# Patient Record
Sex: FEMALE | Race: WHITE | NOT HISPANIC OR LATINO | Employment: FULL TIME | URBAN - METROPOLITAN AREA
[De-identification: names, ages, dates, MRNs, and addresses within clinical notes are randomized per-mention and may not be internally consistent; named-entity substitution may affect disease eponyms.]

---
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
## 2020-03-09 ENCOUNTER — MYC MEDICAL ADVICE (OUTPATIENT)
Dept: PEDIATRICS | Facility: OTHER | Age: 20
End: 2020-03-09

## 2020-03-09 DIAGNOSIS — Z29.8 * * * SBE PROPHYLAXIS * * *: Primary | ICD-10-CM

## 2020-03-09 RX ORDER — AMOXICILLIN 500 MG/1
2000 CAPSULE ORAL ONCE
Qty: 4 CAPSULE | Refills: 0 | Status: SHIPPED | OUTPATIENT
Start: 2020-03-09 | End: 2020-03-09

## 2020-03-09 NOTE — TELEPHONE ENCOUNTER
Called and spoke with patient mother. Scheduled for this Thursday. Pharmacy for rx [facility] [place]

## 2020-03-12 ENCOUNTER — OFFICE VISIT (OUTPATIENT)
Dept: PEDIATRICS | Facility: OTHER | Age: 20
End: 2020-03-12
Payer: COMMERCIAL

## 2020-03-12 VITALS
OXYGEN SATURATION: 96 % | HEIGHT: 63 IN | SYSTOLIC BLOOD PRESSURE: 108 MMHG | RESPIRATION RATE: 22 BRPM | HEART RATE: 120 BPM | WEIGHT: 205 LBS | DIASTOLIC BLOOD PRESSURE: 62 MMHG | BODY MASS INDEX: 36.32 KG/M2 | TEMPERATURE: 97.8 F

## 2020-03-12 DIAGNOSIS — J45.31 MILD PERSISTENT ASTHMA WITH EXACERBATION: Primary | ICD-10-CM

## 2020-03-12 DIAGNOSIS — J45.40 MODERATE PERSISTENT ASTHMA, UNSPECIFIED WHETHER COMPLICATED: ICD-10-CM

## 2020-03-12 DIAGNOSIS — R63.5 WEIGHT GAIN: ICD-10-CM

## 2020-03-12 DIAGNOSIS — R53.83 OTHER FATIGUE: ICD-10-CM

## 2020-03-12 DIAGNOSIS — R63.5 RAPID WEIGHT GAIN: ICD-10-CM

## 2020-03-12 DIAGNOSIS — Z23 NEED FOR PROPHYLACTIC VACCINATION AND INOCULATION AGAINST INFLUENZA: ICD-10-CM

## 2020-03-12 DIAGNOSIS — F90.9 ATTENTION DEFICIT HYPERACTIVITY DISORDER (ADHD), UNSPECIFIED ADHD TYPE: ICD-10-CM

## 2020-03-12 PROCEDURE — 94640 AIRWAY INHALATION TREATMENT: CPT | Performed by: PEDIATRICS

## 2020-03-12 PROCEDURE — 96127 BRIEF EMOTIONAL/BEHAV ASSMT: CPT | Performed by: PEDIATRICS

## 2020-03-12 PROCEDURE — 99214 OFFICE O/P EST MOD 30 MIN: CPT | Mod: 25 | Performed by: PEDIATRICS

## 2020-03-12 RX ORDER — ALBUTEROL SULFATE 90 UG/1
2 AEROSOL, METERED RESPIRATORY (INHALATION) EVERY 4 HOURS PRN
Qty: 2 INHALER | Refills: 4 | Status: SHIPPED | OUTPATIENT
Start: 2020-03-12 | End: 2022-07-20

## 2020-03-12 RX ORDER — ALBUTEROL SULFATE 0.83 MG/ML
2.5 SOLUTION RESPIRATORY (INHALATION) ONCE
Status: COMPLETED | OUTPATIENT
Start: 2020-03-12 | End: 2020-03-12

## 2020-03-12 RX ORDER — ALBUTEROL SULFATE 0.83 MG/ML
2.5 SOLUTION RESPIRATORY (INHALATION) EVERY 4 HOURS PRN
Qty: 1 BOX | Refills: 11 | Status: SHIPPED | OUTPATIENT
Start: 2020-03-12 | End: 2022-07-20

## 2020-03-12 RX ORDER — BUPROPION HYDROCHLORIDE 150 MG/1
150 TABLET ORAL EVERY MORNING
Qty: 30 TABLET | Refills: 0 | Status: SHIPPED | OUTPATIENT
Start: 2020-03-12 | End: 2021-05-19

## 2020-03-12 RX ADMIN — ALBUTEROL SULFATE 2.5 MG: 0.83 SOLUTION RESPIRATORY (INHALATION) at 14:27

## 2020-03-12 ASSESSMENT — ANXIETY QUESTIONNAIRES
5. BEING SO RESTLESS THAT IT IS HARD TO SIT STILL: NOT AT ALL
7. FEELING AFRAID AS IF SOMETHING AWFUL MIGHT HAPPEN: NOT AT ALL
6. BECOMING EASILY ANNOYED OR IRRITABLE: MORE THAN HALF THE DAYS
3. WORRYING TOO MUCH ABOUT DIFFERENT THINGS: MORE THAN HALF THE DAYS
GAD7 TOTAL SCORE: 8
2. NOT BEING ABLE TO STOP OR CONTROL WORRYING: MORE THAN HALF THE DAYS
1. FEELING NERVOUS, ANXIOUS, OR ON EDGE: MORE THAN HALF THE DAYS

## 2020-03-12 ASSESSMENT — PATIENT HEALTH QUESTIONNAIRE - PHQ9
SUM OF ALL RESPONSES TO PHQ QUESTIONS 1-9: 16
5. POOR APPETITE OR OVEREATING: NOT AT ALL

## 2020-03-12 ASSESSMENT — MIFFLIN-ST. JEOR: SCORE: 1670.44

## 2020-03-12 NOTE — PROGRESS NOTES
SUBJECTIVE:                                                      Chief Complaint   Patient presents with     RECHECK      Health Maintenance Due   Topic Date Due     CHLAMYDIA SCREENING  2000     HIV SCREENING  02/02/2015        HPI:  Heather is a 20 year old female who presents to clinic today for a 2-3-day illness consisting of not feeling well, sore throat, cough this morning. No runny/stuffy nose.  She is wheezing and having some dyspnea. No post-tussive emesis. No recent fevers. Having headaches. No myalgias.  Vaccinations UTD. No history of travel outside [place] in the last month or contact with a person diagnosed with with Coronavirus. 2 weeks ago, started Qvar 2 puffs daily. No albuterol inhaler or nebs as she states she cannot find it. No oral steroids in last 1 yr.       ROS: Parent's observations of the patient at home are reduced activity, normal appetite and normal fluid intake.   Voiding at least every 6-8 hours. ROS: Negative for constitutional, eye, ear, nose, throat, skin, respiratory, cardiac, and gastrointestinal other than those outlined in the HPI.    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        H/O: iron deficiency anemia 04/13/2015     Priority: Medium     Mild intellectual disability 03/18/2012     Priority: Medium     Idiopathic thrombocytopenia (H) 01/25/2012     Priority: Medium     Seen by heme/onc Dr. Ram 7/17/12. She noted that patients with VCF sydrome can have macrothrombocytopenia with associated platelet dysfunction.  Heather's blood smear did not show large platelets and she has not had difficulties with bleeding to suggest platelet dysfunction. Pplatelet closure time was done and normal. It is also possible that her intermitted thrombocytopenia is due to immune mediated destruction of platelets as well.  Her platelet count has never been low enough to warrant treatment but she certainly is at risk for more severe thrombocytopenia so if she develops petechaie or brusing would need a CBC.    Last Hem visit 10/15/12:  Intermittent mild thrombocytopenia: Patients with VCF sydrome can have macrothrombocytopenia with associated platelet dysfunction. Heather's blood smear did not show large platelets and she has not had difficulties with bleeding to suggest platelet dysfunction. In addition, her platelet closure time was normal supporting that her platelets do function normally. It is certainly possible that her intermitted thrombocytopenia is due to immune mediated destruction of platelets as well. Her platelet count has never been low enough to warrant treatment but she certainly is at risk for more severe thrombocytopenia so if she develops petechaie or brusing would need a CBC.             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     Priority: Medium     Mild persistent asthma with exacerbation 04/21/2005     Priority:  "Medium     Congenital anomaly of aortic arch      Priority: Medium     Non-obstructed cervical right aortic arch        MEDICATIONS:  Current Outpatient Medications   Medication Sig Dispense Refill     albuterol (PROAIR HFA) 108 (90 BASE) MCG/ACT Inhaler Inhale 2 puffs into the lungs every 4 hours as needed for shortness of breath / dyspnea (cough, wheezing or shortness of breath) 2 Inhaler 1     albuterol (PROVENTIL) (2.5 MG/3ML) 0.083% neb solution Take 1 vial (2.5 mg) by nebulization every 4 hours as needed for shortness of breath / dyspnea 1 Box 11     beclomethasone HFA (QVAR REDIHALER) 40 MCG/ACT inhaler Inhale 2 puffs into the lungs daily 10.6 g 11     escitalopram (LEXAPRO) 20 MG tablet Take 1/2 tab daily x 1 week, then 1 tab daily 30 tablet 0     lactase (LACTAID) 3000 UNIT tablet Take 1 tablet (3,000 Units) by mouth 3 times daily (with meals) 100 tablet 3     methylphenidate ER (CONCERTA) 36 MG CR tablet Take 1 tablet (36 mg) by mouth every morning 30 tablet 0     Methylphenidate HCl ER 36 MG TB24 Take 1 tablet by mouth every morning  0     norgestimate-ethinyl estradiol (ORTHO-CYCLEN/SPRINTEC) 0.25-35 MG-MCG tablet Take 1 tablet by mouth daily 90 tablet 3     ondansetron (ZOFRAN) 8 MG tablet Take 1 tablet (8 mg) by mouth every 8 hours as needed for nausea 15 tablet 0     spacer (OPTICHAMBER GA) holding chamber Use with inhaler 1 each 11     Spacer/Aero-Holding Chambers (OPTICHAMBER ADVANTAGE-LG MASK) MISC 1 Device as needed 2 each 3     Spacer/Aero-Holding Chambers (OPTICHAMBER GA-LG MASK) BUFFY 1 Device as needed 1 each 3      ALLERGIES:  Allergies   Allergen Reactions     No Known Drug Allergies        Problem list and histories reviewed & adjusted, as indicated.    OBJECTIVE:                                                      /62   Pulse 120   Temp 97.8  F (36.6  C) (Temporal)   Resp 22   Ht 5' 3.09\" (1.602 m)   Wt 205 lb (93 kg)   LMP 03/07/2020   SpO2 96%   BMI 36.21 kg/m   "   Blood pressure percentiles are not available for patients who are 18 years or older.    General: alert, active, mildly ill-appearing, non-toxic  HEENT: conjunctiva non-injected, oral pharynx erythematous without exudate or lesions, MMM  Neck: supple, normal ROM, no adenopathy  Ears: Left: Pinna/ tragus non-tender. Normal ear canal. Tympanic membrane pearly gray with sharp landmarks. Right: Pinna/ tragus non-tender. Normal ear canal. Tympanic membrane pearly gray with sharp landmarks.   Lungs (pre and post albuterol neb):   Pre-neb: no retractions, decreased breath sounds, no rhonchi, no rales and few wheezes.  Post-neb: no retractions, improved breath sounds,  no rhonchi, no rales and diffuse expiratory wheezes.  CV: RRR, no murmurs, CR < 2 sec  ABDM: soft  Skin: no rashes    DIAGNOSTICS:  None      ASSESSMENT/PLAN:     Mild Intermittent Asthma with Exacerbation--  Viral URI--    Steroid burst not indicated.    Start albuterol nebs/inhaler every 4-6 hours as needed for cough, wheeze or shortness of breath. Continue at least 2-3 times daily until cough gone.  Continue daily preventative medication therapy: will increase Qvar 40 mcg to 2 puffs 2 times daily during colds.   Asthma Action Plan is up-to-date. Copy given.   Recommend annual Influenza vaccine.             SUBJECTIVE:                                                      Chief Complaint   Patient presents with     RECHECK      Health Maintenance Due   Topic Date Due     CHLAMYDIA SCREENING  2000     HIV SCREENING  02/02/2015        HPI:  Heather is a 20 year old female who also presents to clinic today with her mom for concerns for depression for the past year.     Heather endorses feeling unmotivated and depressed. Mom states that she just wants to lay in bed all the time with shades pulled, on electronics. She is not socializing with the family and frequently not eating supper with the family. Her hours at [employer] have been cut. Mom suspects this  is due to her lack of motivation. Mom also concerned that she is gaining weight with not exercising and eating sweets.     Her teacher has also expressed concerns for depression. She recently emailed mom the following: She seems to have lost interest in most things that she used to be excited about. When we met last we talked about Heather's attitude about coming to [employer] and this has seen a decline. She is extremely unmotivated to engage in any activities in class. She seems to have lost interest in most things in life. I have worked with Heather since she was in 9th grade and knew her in elementary. The Heather I see now is not the Heather I knew previously.      Heather endorses having a depressed mood or loss of interest or pleasure almost every day.   Endorses having significant weight weight gain.  Endorses having hypersomnia nearly every day.  Endorses having fatigue or loss of energy nearly every day.   Mom endorses psychomotor retardation nearly every day.     The symptoms cause clinically significant distress or impairment in social, occupational, or other important areas of functioning. The symptoms are not attributable to drugs or to another medical condition. The symptoms are not attributable to a significant loss.      No history of emanuel or hallucinations. No parental concern for risky behaviors including illicit and Rx drug use, cutting, suicidal ideation or attempts.     Family history is remarkable anxiety and depression on both sides.  No bipolar disorder. New stressors include learning how to drive. Ongoing stressors include weight gain.    Behavioral therapy includes 1 year ago, stopped after about 7 sessions because she states it didn't feel like it was helping as she would just get talking then have to wrap up the sessions.  Extracurricular activities: none.  Exercise: no regular.    Confidentially, Heather denies high risk behaviors including tobacco, alcohol or drug use. Denies sexual  activity. Denies cutting and self harm behaviors. No SI or HI. Not taking OTC or Rx medications without a parent or provider's advisement.       ROS: Negative for constitutional, eye, ear, nose, throat, skin, respiratory, cardiac, and gastrointestinal other than those outlined in the HPI.    Past Medical History:   Diagnosis Date     Closed fracture of shaft of femur (H) 3/01    casted     Congenital anomaly of aortic arch 2000    non-obstructive, cervical Rt aortic arch, median gradient 6 mm HG, stable, last ECHO 11/2011     Menorrhagia with regular cycle 4/5/2016     Mild persistent asthma 4/21/2005     Nonspecific abnormal auditory function studies 2002    mild hearing loss bilaterally, may need hearing aids in the future     Other closed fracture of lower end of humerus 9/04    right elbow intercondylar transverse fx w/ posterior dislocation, treated surgically     Other speech disturbance 3/6/2002    speech therapy at school     Unspecified otitis media     Recurrent otitis     Velocardiofacial syndrome      Past Surgical History:   Procedure Laterality Date     ADENOIDECTOMY  04/11/2006     C ECHO HEART XTHORACIC,COMPLETE, W/O DOPPLER  9/04    right aortic arch     C NONSPECIFIC PROCEDURE  9/04    right elbow fx surgically repaired at [facility]     EXAM UNDER ANESTHESIA EAR(S) Right 1/14/2015    Procedure: EXAM UNDER ANESTHESIA EAR(S);  Surgeon: Maryjo Slade MD;  Location: [facility] OR     HC CREATE EARDRUM OPENING,GEN ANESTH  4/2002, 7/25/2007, 5/19/08    P.E. Tubes     HC RECONSTRUCTION OF THROAT  07/25/2007, 5/19/08    Pharyngeal flap attachment     REMOVE TUBE, MYRINGOTOMY, INSERT PAPER PATCH, COMBINED Left 1/14/2015    Procedure: COMBINED REMOVE TUBE, MYRINGOTOMY, INSERT PAPER PATCH;  Surgeon: Maryjo Slade MD;  Location: [facility] OR     .  Patient Active Problem List   Diagnosis     Congenital anomaly of aortic arch     Mild persistent asthma with exacerbation     Velocardiofacial  syndrome     Health Care Home     Idiopathic thrombocytopenia (H)     Mild intellectual disability     H/O: iron deficiency anemia     Attention deficit hyperactivity disorder (ADHD), unspecified ADHD type     Speech articulation disorder     Cutis marmorata     Hearing loss, unspecified hearing loss type, L     H/O vitamin D deficiency     Low calcium levels     Moderate persistent asthma, unspecified whether complicated     Lactose intolerance     Rapid weight gain     JOHN (generalized anxiety disorder)     Elevated TSH     Past Medical History:   Diagnosis Date     Closed fracture of shaft of femur (H) 3/01    casted     Congenital anomaly of aortic arch 2000    non-obstructive, cervical Rt aortic arch, median gradient 6 mm HG, stable, last ECHO 11/2011     Menorrhagia with regular cycle 4/5/2016     Mild persistent asthma 4/21/2005     Nonspecific abnormal auditory function studies 2002    mild hearing loss bilaterally, may need hearing aids in the future     Other closed fracture of lower end of humerus 9/04    right elbow intercondylar transverse fx w/ posterior dislocation, treated surgically     Other speech disturbance 3/6/2002    speech therapy at school     Unspecified otitis media     Recurrent otitis     Velocardiofacial syndrome      Current Outpatient Medications   Medication     albuterol (PROAIR HFA) 108 (90 Base) MCG/ACT inhaler     albuterol (PROVENTIL) (2.5 MG/3ML) 0.083% neb solution     beclomethasone HFA (QVAR REDIHALER) 40 MCG/ACT inhaler     escitalopram (LEXAPRO) 20 MG tablet     lactase (LACTAID) 3000 UNIT tablet     methylphenidate ER (CONCERTA) 36 MG CR tablet     Methylphenidate HCl ER 36 MG TB24     norgestimate-ethinyl estradiol (ORTHO-CYCLEN/SPRINTEC) 0.25-35 MG-MCG tablet     ondansetron (ZOFRAN) 8 MG tablet     spacer (OPTICPartigiBER GA) holding chamber     Spacer/Aero-Holding Chambers (OPTICHAMBER ADVANTAGE-LG MASK) MISC     Spacer/Aero-Holding Chambers (OPTICHAMBER GA-LG  "MASK) BUFFY     No current facility-administered medications for this visit.         Allergies   Allergen Reactions     No Known Drug Allergies        Social History:  Heather lives with her parents. Attends [employer] school and works at [employer].    OBJECTIVE:                                                      /62   Pulse 120   Temp 97.8  F (36.6  C) (Temporal)   Resp 22   Ht 5' 3.09\" (1.602 m)   Wt 205 lb (93 kg)   LMP 03/07/2020   SpO2 96%   BMI 36.21 kg/m      Physical Exam:  Appearance: in no apparent distress, well developed and well nourished   Neck: question thyromegaly, no masses  Heart: S1, S2 normal, no murmur, no gallop, rate regular  Lungs (pre and post albuterol neb):   Pre-neb: no retractions, decreased breath sounds, no rhonchi, no rales and few wheezes.  Post-neb: no retractions, improved breath sounds,  no rhonchi, no rales and diffuse expiratory wheezes.  Neuro: CN 2-12 intact, PERR, normal gait  Skin: no cutting  Psych: alert and oriented x 3, appropriate affect    PHQ 10/25/2019 3/12/2020   PHQ-9 Total Score 12 16   Q9: Thoughts of better off dead/self-harm past 2 weeks Not at all Not at all     JOHN-7 SCORE 10/25/2019 3/12/2020   Total Score 8 (mild anxiety) -   Total Score 8 8           ASSESSMENT/PLAN:                                                      Major Depressive Disorder (MDD)--  ADHD (Attention Deficit Hyperactivity Disorder) --    Laboratory evaluation to evaluate for organic causes.   Given mom's success with bupropion (WELLBUTRIN XL) and indications for Major Depressive Disorder (MDD) and ADHD (Attention Deficit Hyperactivity Disorder), it is a reasonable choice: 150 mg daily.   Reviewed importance of behavioral therapy for skill building, emotion regulation of skills and eventual ability to wean off of medication therapy. Recommend asking for cognitive behavioral therapy. Family to call insurance for preferred therapists. Suggested providers per Patient " Instructions.    Reviewed importance of coping skills including 1 hour of daily aerobic activity 5 times weekly, writing in a journal, and listening to music.    Consider purchasing the  The Positivity Kit or other interactive journals by Esme Fox.  Recommend family remove all guns from home and lock up medications.   Family agrees with plan for counceling and frequent visits during the first 2 months of therapy.  Patient to call in 2 weeks to discuss potential side effects and RTC in 1 month if tollerating medication well.  Heather agrees to call myself, a parent or 911 if there is concern for hurting oneself or others. Heather has my card to reach me at clinic. Emergency hotline numbers given.     Patient and Heather express understanding and agreement with the plan and has no further questions.    Electronically signed by Debbie Rodriguez MD.          .

## 2020-03-12 NOTE — LETTER
My Asthma Action Plan    Name: Heather Lovett   YOB: 2000  Date: 10/25/2019   My doctor: Debbie Rodriguez MD, MD   My clinic: [facility]        My Control Medicine: Beclomethasone dipropionate (Qvar Redihaler) -  40 mcg 2 puffs once daily with spacer, increase to 2 times daily during colds  My Rescue Medicine: Albuterol Nebulizer Solution 1 vial EVERY 4 HOURS as needed -OR- Albuterol (Proair/Ventolin/Proventil HFA) 2 puffs EVERY 4 HOURS as needed. Use a spacer if recommended by your provider.  My Oral Steroid Medicine: none My Asthma Severity:   Moderate Persistent  Know your asthma triggers: upper respiratory infections, exercise or sports and anxiety        The medication may be given at school or day care?: Yes  Child can carry and use inhaler at school with approval of school nurse?: Yes       GREEN ZONE   Good Control    I feel good    No cough or wheeze    Can work, sleep and play without asthma symptoms       Take your asthma control medicine every day.     1. If exercise triggers your asthma, take your rescue medication    15 minutes before exercise or sports, and    During exercise if you have asthma symptoms  2. Spacer to use with inhaler: If you have a spacer, make sure to use it with your inhaler             YELLOW ZONE Getting Worse  I have ANY of these:    I do not feel good    Cough or wheeze    Chest feels tight    Wake up at night   1. Keep taking your Green Zone medications  2. Start taking your rescue medicine:    every 20 minutes for up to 1 hour. Then every 4 hours for 24-48 hours.  3. If you stay in the Yellow Zone for more than 12-24 hours, contact your doctor.  4. If you do not return to the Green Zone in 12-24 hours or you get worse, start taking your oral steroid medicine if prescribed by your provider.           RED ZONE Medical Alert - Get Help  I have ANY of these:    I feel awful    Medicine is not helping    Breathing getting harder    Trouble walking or  talking    Nose opens wide to breathe       1. Take your rescue medicine NOW  2. If your provider has prescribed an oral steroid medicine, start taking it NOW  3. Call your doctor NOW  4. If you are still in the Red Zone after 20 minutes and you have not reached your doctor:    Take your rescue medicine again and    Call 911 or go to the emergency room right away    See your regular doctor within 2 weeks of an Emergency Room or Urgent Care visit for follow-up treatment.          Annual Reminders:  Meet with Asthma Educator. Make sure your child gets their flu shot in the fall and is up to date with all vaccines.    Pharmacy:    [id number] - [place], [place] - [address] [place] [address] [facility] [place] [address] [facility] [place] - [place], [place] - [address] [id number] - [place], [place] - [address]                          Asthma Triggers  How To Control Things That Make Your Asthma Worse    Triggers are things that make your asthma worse.  Look at the list below to help you find your triggers and what you can do about them.  You can help prevent asthma flare-ups by staying away from your triggers.      Trigger                                                          What you can do   Cigarette Smoke  Tobacco smoke can make asthma worse. Do not allow smoking in your home, car or around you.  Be sure no one smokes at a child s day care or school.  If you smoke, ask your health care provider for ways to help you quit.  Ask family members to quit too.  Ask your health care provider for a referral to Quit Plan to help you quit smoking, or call 1-529-239-PLAN.     Colds, Flu, Bronchitis  These are common triggers of asthma. Wash your hands often.  Don t touch your eyes, nose or mouth.  Get a flu shot every year.     Dust Mites  These are tiny bugs that live in cloth or carpet. They are too small to see. Wash sheets and blankets in hot water every week.   Encase  pillows and mattress in dust mite proof covers.  Avoid having carpet if you can. If you have carpet, vacuum weekly.   Use a dust mask and HEPA vacuum.   Pollen and Outdoor Mold  Some people are allergic to trees, grass, or weed pollen, or molds. Try to keep your windows closed.  Limit time out doors when pollen count is high.   Ask you health care provider about taking medicine during allergy season.     Animal Dander  Some people are allergic to skin flakes, urine or saliva from pets with fur or feathers. Keep pets with fur or feathers out of your home.    If you can t keep the pet outdoors, then keep the pet out of your bedroom.  Keep the bedroom door closed.  Keep pets off cloth furniture and away from stuffed toys.     Mice, Rats, and Cockroaches   Some people are allergic to the waste from these pests.   Cover food and garbage.  Clean up spills and food crumbs.  Store grease in the refrigerator.   Keep food out of the bedroom.   Indoor Mold  This can be a trigger if your home has high moisture. Fix leaking faucets, pipes, or other sources of water.   Clean moldy surfaces.  Dehumidify basement if it is damp and smelly.   Smoke, Strong Odors, and Sprays  These can reduce air quality. Stay away from strong odors and sprays, such as perfume, powder, hair spray, paints, smoke incense, paint, cleaning products, candles and new carpet.   Exercise or Sports  Some people with asthma have this trigger. Be active!  Ask your doctor about taking medicine before sports or exercise to prevent symptoms.    Warm up for 5-10 minutes before and after sports or exercise.     Other Triggers of Asthma  Cold air:  Cover your nose and mouth with a scarf.  Sometimes laughing or crying can be a trigger.  Some medicines and food can trigger asthma.

## 2020-03-12 NOTE — PATIENT INSTRUCTIONS
"Recommendations in caring for Heather:    Asthma--  Asthma Action Plan updated.    Will increase Qvar to 2 times daily during colds.     Major Depressive Disorder (MDD)--  ADHD (Attention Deficit Hyperactivity Disorder) --    Laboratory evaluation to evaluate for organic causes.   Will start Wellbutrin  mg. Recommend using a pill box and taking medication with food in the morning.   Reviewed importance of behavioral therapy for skill building, emotion regulation of skills and eventual ability to wean off of medication therapy. Recommend asking for cognitive behavioral therapy. Family to call insurance for preferred therapists. Suggested providers per Patient Instructions.    Reviewed importance of coping skills including 1 hour of daily aerobic activity 5 times weekly, writing in a journal, and listening to music.    Consider purchasing the  The Positivity Kit or other interactive journals by Esme Fox.  Recommend family remove all guns from home and lock up medications.   Family agrees with plan for counceling and frequent visits during the first 2 months of therapy.  Patient to call in 2 weeks to discuss potential side effects and RTC in 1 month if tollerating medication well.  Heather agrees to call myself, a parent or 911 if there is concern for hurting oneself or others. Heather has my card to reach me at clinic. Emergency hotline numbers given.     Resources:   Text 4 Life: text \"life\" to 738[address] to speak with a trained counselor for free.   Suicide Prevention Lifeline - 2-216-407-2597  Crisis Intervention: 807.608.4601 or 781-543-7307 (TTY: 352.159.3481).   National [place] on Mental Illness (www.[place].jos[place] miguel.org): 688.113.3352 or 229-309-3972 [place] Association for Children's Mental Health (www.macmh.org): 720.244.1795. Suicide Awareness Voices of Education (SAVE) (www.save.org): 655-104-NJGP (1296)   National Suicide Prevention Line (www.mentalhealthmn.org): 325-913-OMUO (5602) Mental Health Consumer/Survivor " [facility] [place] (www.[facility].net): 355-807-5825 or 149-089-0389    Pediatric Behavioral Therapy Providers:    [facility] (therapy only)- [phone] ([place], [place]) - [phone]  Psychiatry ([place] [facility] [place]) - [phone]  [facility] - [phone]  [facility] ([place]) at [phone]  [facility] ([place], [facility], [place]) - [phone]  [facility]  ([facility], [place]) - [phone]  [facility]  ([place]) - [phone]  [facility] ([place]) - [phone]  [facility] - [phone]   [facility]) - [phone]   [facility]) - [phone]          Patient Education

## 2020-03-13 ENCOUNTER — TELEPHONE (OUTPATIENT)
Dept: PEDIATRICS | Facility: OTHER | Age: 20
End: 2020-03-13

## 2020-03-13 ASSESSMENT — ANXIETY QUESTIONNAIRES: GAD7 TOTAL SCORE: 8

## 2020-03-13 ASSESSMENT — ASTHMA QUESTIONNAIRES: ACT_TOTALSCORE: 20

## 2020-08-18 ENCOUNTER — NURSE TRIAGE (OUTPATIENT)
Dept: NURSING | Facility: CLINIC | Age: 20
End: 2020-08-18

## 2020-08-18 NOTE — TELEPHONE ENCOUNTER
"Off and on past month has right sided rib pain/ hurts mainly when she rests on it/ it can go as high as an \"8\" then/ no known history of injury/no fever/wants to be seen today.  Sent to scheduling  Anshul Elkins RN -[phone]    Additional Information    Negative: Severe difficulty breathing (e.g., struggling for each breath, speaks in single words)    Negative: Passed out (i.e., fainted, collapsed and was not responding)    Negative: Chest pain lasting longer than 5 minutes and ANY of the following:* Over 50 years old* Over 30 years old and at least one cardiac risk factor (i.e., high blood pressure, diabetes, high cholesterol, obesity, smoker or strong family history of heart disease)* Pain is crushing, pressure-like, or heavy * Took nitroglycerin and chest pain was not relieved* History of heart disease (i.e., angina, heart attack, bypass surgery, angioplasty, CHF)    Negative: Visible sweat on face or sweat dripping down face    Negative: Sounds like a life-threatening emergency to the triager    Negative: Followed an injury to chest    Negative: SEVERE chest pain    Negative: Pain also present in shoulder(s) or arm(s) or jaw    Negative: Difficulty breathing    Negative: Cocaine use within last 3 days    Negative: History of prior 'blood clot' in leg or lungs (i.e., deep vein thrombosis, pulmonary embolism)    Negative: Recent illness requiring prolonged bed rest (i.e., immobilization)    Negative: Hip or leg fracture in past 2 months (e.g, or had cast on leg or ankle)    Negative: Major surgery in the past month    Negative: Recent long-distance travel with prolonged time in car, bus, plane, or train (i.e., within past 2 weeks; 6 or more hours duration)    Negative: Heart beating irregularly or very rapidly    Negative: Chest pain lasting longer than 5 minutes    Negative: Intermittent chest pain and pain has been increasing in severity or frequency    Negative: Dizziness or lightheadedness    Negative: " Coughing up blood    Negative: Patient sounds very sick or weak to the triager    Negative: Fever > 100.5 F (38.1 C)    Negative: Intermittent chest pains persist > 3 days    Negative: All other patients with chest pain    Patient wants to be seen    Protocols used: CHEST PAIN-A-OH

## 2020-09-04 NOTE — PROGRESS NOTES
Subjective     Heather Lovett is a 20 year old female who presents to clinic today for the following health issues:    HPI       Concern - rib pain   Onset: 5-6 months, but worsening   Description: rib pain - normally on right side but left side has been hurting too   Intensity: 5/10 currently, but can be 7-8/10   Progression of Symptoms:  worsening  Accompanying Signs & Symptoms: throwing up/nausea when laying on side   Previous history of similar problem: no  Precipitating factors:        Worsened by: lifting things, bending down, standing/walking   Alleviating factors:        Improved by: no  Therapies tried and outcome:  none       Answers for HPI/ROS submitted by the patient on 9/10/2020   If you checked off any problems, how difficult have these problems made it for you to do your work, take care of things at home, or get along with other people?: Somewhat difficult  PHQ9 TOTAL SCORE: 13  JOHN 7 TOTAL SCORE: 14        Review of Systems   Constitutional, HEENT, cardiovascular, pulmonary, GI, , musculoskeletal, neuro, skin, endocrine and psych systems are negative, except as otherwise noted.      Objective    There were no vitals taken for this visit.  There is no height or weight on file to calculate BMI.  Physical Exam   GENERAL: healthy, alert and no distress  EYES: Eyes grossly normal to inspection, PERRL and conjunctivae and sclerae normal  HENT: ear canals and TM's normal, nose and mouth without ulcers or lesions  NECK: no adenopathy, no asymmetry, masses, or scars and thyroid normal to palpation  RESP: tight chest, with local discomfort along lower ribs. Unable to produce enough air for a good cough - poor air flow for forcible air movement  CV: regular rate and rhythm, normal S1 S2, no S3 or S4, no murmur, click or rub, no peripheral edema and peripheral pulses strong  ABDOMEN: soft, nontender, no hepatosplenomegaly, no masses and bowel sounds normal  MS: no gross musculoskeletal defects noted, no  "edema  SKIN: no suspicious lesions or rashes  NEURO: Normal strength and tone, mentation intact and speech normal  PSYCH: mentation appears normal, affect normal/bright    CXR clear        Assessment & Plan       ICD-10-CM    1. Mild persistent asthma with exacerbation  J45.31 XR Chest 2 Views     fluticasone-salmeterol (ADVAIR) 500-50 MCG/DOSE inhaler   2. SOB (shortness of breath)  R06.02 XR Chest 2 Views   3. Chest tightness  R07.89 XR Chest 2 Views      Patient is having an asthma exacerbation. Increased to adviar to help. Offered prednisone as she is tight enough to need more. But with the h/o thrombocytopenia and this can worsen with this, so will hold off for now.  Given ACT to complete and return in 1 month or we call her for results and direct her back to determine long term controller needs.     BMI:   Estimated body mass index is 38.68 kg/m  as calculated from the following:    Height as of this encounter: 1.602 m (5' 3.09\").    Weight as of this encounter: 99.3 kg (219 lb).   Weight management plan: Discussed healthy diet and exercise guidelines      Depression Screening Follow Up    PHQ 9/10/2020   PHQ-9 Total Score 13   Q9: Thoughts of better off dead/self-harm past 2 weeks Not at all         Follow Up Actions Taken  discussed today as possibly contributing, though she feels worry over worse asthma. Though worsening anxiety may also need to be directly addressed in near future.             Return in about 4 weeks (around 10/8/2020).    Debbie Lewis MD, MD  [facility]    "

## 2020-09-10 ENCOUNTER — OFFICE VISIT (OUTPATIENT)
Dept: FAMILY MEDICINE | Facility: OTHER | Age: 20
End: 2020-09-10
Payer: COMMERCIAL

## 2020-09-10 ENCOUNTER — TELEPHONE (OUTPATIENT)
Dept: FAMILY MEDICINE | Facility: OTHER | Age: 20
End: 2020-09-10

## 2020-09-10 ENCOUNTER — ANCILLARY PROCEDURE (OUTPATIENT)
Dept: GENERAL RADIOLOGY | Facility: OTHER | Age: 20
End: 2020-09-10
Attending: FAMILY MEDICINE
Payer: COMMERCIAL

## 2020-09-10 VITALS
DIASTOLIC BLOOD PRESSURE: 70 MMHG | RESPIRATION RATE: 18 BRPM | BODY MASS INDEX: 38.8 KG/M2 | HEIGHT: 63 IN | TEMPERATURE: 98.1 F | OXYGEN SATURATION: 98 % | HEART RATE: 70 BPM | SYSTOLIC BLOOD PRESSURE: 114 MMHG | WEIGHT: 219 LBS

## 2020-09-10 DIAGNOSIS — F41.1 GAD (GENERALIZED ANXIETY DISORDER): ICD-10-CM

## 2020-09-10 DIAGNOSIS — R06.02 SOB (SHORTNESS OF BREATH): ICD-10-CM

## 2020-09-10 DIAGNOSIS — R07.89 CHEST TIGHTNESS: ICD-10-CM

## 2020-09-10 DIAGNOSIS — J45.31 MILD PERSISTENT ASTHMA WITH EXACERBATION: ICD-10-CM

## 2020-09-10 DIAGNOSIS — D69.3 IDIOPATHIC THROMBOCYTOPENIA (H): ICD-10-CM

## 2020-09-10 DIAGNOSIS — J45.31 MILD PERSISTENT ASTHMA WITH EXACERBATION: Primary | ICD-10-CM

## 2020-09-10 PROCEDURE — 71046 X-RAY EXAM CHEST 2 VIEWS: CPT

## 2020-09-10 PROCEDURE — 99214 OFFICE O/P EST MOD 30 MIN: CPT | Performed by: FAMILY MEDICINE

## 2020-09-10 ASSESSMENT — ANXIETY QUESTIONNAIRES
5. BEING SO RESTLESS THAT IT IS HARD TO SIT STILL: MORE THAN HALF THE DAYS
GAD7 TOTAL SCORE: 14
GAD7 TOTAL SCORE: 14
2. NOT BEING ABLE TO STOP OR CONTROL WORRYING: MORE THAN HALF THE DAYS
GAD7 TOTAL SCORE: 14
3. WORRYING TOO MUCH ABOUT DIFFERENT THINGS: MORE THAN HALF THE DAYS
1. FEELING NERVOUS, ANXIOUS, OR ON EDGE: MORE THAN HALF THE DAYS
7. FEELING AFRAID AS IF SOMETHING AWFUL MIGHT HAPPEN: MORE THAN HALF THE DAYS
7. FEELING AFRAID AS IF SOMETHING AWFUL MIGHT HAPPEN: MORE THAN HALF THE DAYS
6. BECOMING EASILY ANNOYED OR IRRITABLE: MORE THAN HALF THE DAYS
4. TROUBLE RELAXING: MORE THAN HALF THE DAYS

## 2020-09-10 ASSESSMENT — MIFFLIN-ST. JEOR: SCORE: 1733.93

## 2020-09-10 ASSESSMENT — PAIN SCALES - GENERAL: PAINLEVEL: MODERATE PAIN (5)

## 2020-09-10 ASSESSMENT — PATIENT HEALTH QUESTIONNAIRE - PHQ9
10. IF YOU CHECKED OFF ANY PROBLEMS, HOW DIFFICULT HAVE THESE PROBLEMS MADE IT FOR YOU TO DO YOUR WORK, TAKE CARE OF THINGS AT HOME, OR GET ALONG WITH OTHER PEOPLE: SOMEWHAT DIFFICULT
SUM OF ALL RESPONSES TO PHQ QUESTIONS 1-9: 13
SUM OF ALL RESPONSES TO PHQ QUESTIONS 1-9: 13

## 2020-09-10 NOTE — TELEPHONE ENCOUNTER
Per AE - will need to call patient if ACT is not returned in 1 month. Will postpone this message for 3 weeks to see if it's been sent to us yet.   71

## 2020-09-11 ASSESSMENT — ANXIETY QUESTIONNAIRES: GAD7 TOTAL SCORE: 14

## 2020-09-11 ASSESSMENT — ASTHMA QUESTIONNAIRES: ACT_TOTALSCORE: 19

## 2020-10-19 ENCOUNTER — MYC MEDICAL ADVICE (OUTPATIENT)
Dept: PEDIATRICS | Facility: OTHER | Age: 20
End: 2020-10-19

## 2020-10-19 DIAGNOSIS — Q25.40 CONGENITAL ANOMALY OF AORTIC ARCH: Primary | ICD-10-CM

## 2020-10-19 NOTE — TELEPHONE ENCOUNTER
Will route to covering provider to review and advise.    Pedro Rahman MA on 10/19/2020 at 3:25 PM

## 2020-10-19 NOTE — TELEPHONE ENCOUNTER
Please let mother know Heather would need to establish care with a different provider- ideally an adult PCP. History of congenital heart disease, asthma and mental health issues. Has not had a well visit this year to address these concerns. Call [phone] to schedule.

## 2020-10-21 RX ORDER — AMOXICILLIN 500 MG/1
2000 CAPSULE ORAL ONCE
Qty: 4 CAPSULE | Refills: 0 | Status: SHIPPED | OUTPATIENT
Start: 2020-10-21 | End: 2020-10-21

## 2020-10-21 NOTE — TELEPHONE ENCOUNTER
Called and scheduled patient for 11/4/20 with Dr Lewis.  She has a dentist appointment tomorrow and hoping antibiotics can be sent today yet to [facility]?

## 2020-11-03 NOTE — PROGRESS NOTES
SUBJECTIVE:   CC: Heather Lovett is an 20 year old woman who presents for preventive health visit.       Patient has been advised of split billing requirements and indicates understanding: Yes  Healthy Habits:     Getting at least 3 servings of Calcium per day:  NO    Bi-annual eye exam:  Yes    Dental care twice a year:  Yes    Sleep apnea or symptoms of sleep apnea:  None    Diet:  Breakfast skipped    Frequency of exercise:  1 day/week    Duration of exercise:  Less than 15 minutes    Taking medications regularly:  Yes    Medication side effects:  None    PHQ-2 Total Score: 1    Additional concerns today:  No      Today's PHQ-2 Score:   PHQ-2 ( 1999 [employer]) 11/4/2020   Q1: Little interest or pleasure in doing things 0   Q2: Feeling down, depressed or hopeless 1   PHQ-2 Score 1   Q1: Little interest or pleasure in doing things Not at all   Q2: Feeling down, depressed or hopeless Several days   PHQ-2 Score 1       Abuse: Current or Past (Physical, Sexual or Emotional) - No  Do you feel safe in your environment? Yes        Social History     Tobacco Use     Smoking status: Never Smoker     Smokeless tobacco: Never Used   Substance Use Topics     Alcohol use: No     Alcohol/week: 0.0 standard drinks         Alcohol Use 11/4/2020   Prescreen: >3 drinks/day or >7 drinks/week? No       Reviewed orders with patient.  Reviewed health maintenance and updated orders accordingly - Yes  Lab work is in process    Mammogram not appropriate for this patient based on age.    Pertinent mammograms are reviewed under the imaging tab.  History of abnormal Pap smear: NO - under age 21, PAP not appropriate for age     Reviewed and updated as needed this visit by clinical staff  Tobacco  Allergies  Meds  Problems  Med Hx  Surg Hx  Fam Hx  Soc Hx          Reviewed and updated as needed this visit by Provider  Tobacco  Allergies  Meds  Problems  Med Hx  Surg Hx  Fam Hx             Review of Systems   Constitutional:  "Negative for chills and fever.   HENT: Negative for congestion, ear pain, hearing loss and sore throat.    Eyes: Negative for pain and visual disturbance.   Respiratory: Negative for cough and shortness of breath.    Cardiovascular: Negative for chest pain, palpitations and peripheral edema.   Gastrointestinal: Positive for nausea. Negative for abdominal pain, constipation, diarrhea, heartburn and hematochezia.   Genitourinary: Negative for dysuria, frequency, genital sores, hematuria and urgency.   Musculoskeletal: Negative for arthralgias, joint swelling and myalgias.   Skin: Negative for rash.   Neurological: Positive for headaches. Negative for dizziness, weakness and paresthesias.   Psychiatric/Behavioral: Negative for mood changes. The patient is not nervous/anxious.           OBJECTIVE:   /72   Pulse 96   Temp 98.7  F (37.1  C) (Temporal)   Resp 18   Ht 1.617 m (5' 3.66\")   Wt 101.6 kg (224 lb)   SpO2 98%   BMI 38.86 kg/m    Physical Exam  GENERAL: healthy, alert and no distress  EYES: Eyes grossly normal to inspection, PERRL and conjunctivae and sclerae normal  HENT: ear canals and TM's normal, nose and mouth without ulcers or lesions  NECK: no adenopathy, no asymmetry, masses, or scars and thyroid normal to palpation  RESP: lungs clear to auscultation - no rales, rhonchi or wheezes  CV: regular rate and rhythm, normal S1 S2, no S3 or S4, no murmur, click or rub, no peripheral edema and peripheral pulses strong  ABDOMEN: soft, nontender, no hepatosplenomegaly, no masses and bowel sounds normal  MS: no gross musculoskeletal defects noted, no edema  SKIN: no suspicious lesions or rashes  NEURO: Normal strength and tone, mentation intact and speech normal  PSYCH: anxious        ASSESSMENT/PLAN:       ICD-10-CM    1. Routine general medical examination at a health care facility  Z00.00    2. JOHN (generalized anxiety disorder)  F41.1    3. Attention deficit hyperactivity disorder (ADHD), unspecified " ADHD type  F90.9    4. 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 Moderate persistent asthma, unspecified whether complicated  J45.40    6. Idiopathic thrombocytopenia (H)  D69.3 CBC with platelets and differential   7. Screening for STDs (sexually transmitted diseases)  Z11.3    8. Screening for HIV (human immunodeficiency virus)  Z11.4    9. Need for hepatitis C screening test  Z11.59    10. Morbid obesity (H)  E66.01 TSH with free T4 reflex     Hemoglobin A1c     **Vitamin D Deficiency FUTURE 2mo   11. Mild persistent asthma with exacerbation  J45.31 fluticasone-salmeterol (ADVAIR) 500-50 MCG/DOSE inhaler   12. Menorrhagia with regular cycle  N92.0 norgestimate-ethinyl estradiol (ORTHO-CYCLEN) 0.25-35 MG-MCG tablet     Has chosen to stop ADHD meds and anxiety meds and appears quite anxious today as well as mentions desire to be accompanied to her echo appt. She has had multiples of these throughout the years and logically understands not to be fearful, yet she is. Discussed and is still not interested in returning to meds as she feels she generally does well.   Her asthma has much improved and her lungs are not much more open, though a mild end expiratory wheeze is noted still. She feels she hasn't had the rib pain today and has been less often, so likely related. But also has stress and anxiety which can relate to chest/rib pain that comes and goes. Will keep this in mind and plans to complete her echo tomorrow and if returning chest pain, may need to revisit mood.  Labs updated today as well as her ocps, which she is not taking in the last few months as she is not sexually active and menses had improved.    Patient has been advised of split billing requirements and indicates understanding: Yes  COUNSELING:  Reviewed preventive health counseling, as reflected in patient instructions       Regular exercise       Healthy  "diet/nutrition    Estimated body mass index is 38.86 kg/m  as calculated from the following:    Height as of this encounter: 1.617 m (5' 3.66\").    Weight as of this encounter: 101.6 kg (224 lb).    Weight management plan: Discussed healthy diet and exercise guidelines    She reports that she has never smoked. She has never used smokeless tobacco.      Counseling Resources:  ATP IV Guidelines  Pooled Cohorts Equation Calculator  Breast Cancer Risk Calculator  BRCA-Related Cancer Risk Assessment: FHS-7 Tool  FRAX Risk Assessment  ICSI Preventive Guidelines  Dietary Guidelines for Americans, 2010  USDA's MyPlate  ASA Prophylaxis  Lung CA Screening    Debbie Lewis MD, MD  [facility]  Answers for HPI/ROS submitted by the patient on 11/4/2020   Annual Exam:  If you checked off any problems, how difficult have these problems made it for you to do your work, take care of things at home, or get along with other people?: Not difficult at all  PHQ9 TOTAL SCORE: 9  JOHN 7 TOTAL SCORE: 6    "

## 2020-11-04 ENCOUNTER — OFFICE VISIT (OUTPATIENT)
Dept: FAMILY MEDICINE | Facility: OTHER | Age: 20
End: 2020-11-04
Payer: COMMERCIAL

## 2020-11-04 VITALS
RESPIRATION RATE: 18 BRPM | DIASTOLIC BLOOD PRESSURE: 72 MMHG | SYSTOLIC BLOOD PRESSURE: 128 MMHG | HEIGHT: 64 IN | TEMPERATURE: 98.7 F | BODY MASS INDEX: 38.24 KG/M2 | OXYGEN SATURATION: 98 % | HEART RATE: 96 BPM | WEIGHT: 224 LBS

## 2020-11-04 DIAGNOSIS — N92.0 MENORRHAGIA WITH REGULAR CYCLE: ICD-10-CM

## 2020-11-04 DIAGNOSIS — Z00.00 ROUTINE GENERAL MEDICAL EXAMINATION AT A HEALTH CARE FACILITY: Primary | ICD-10-CM

## 2020-11-04 DIAGNOSIS — E66.01 MORBID OBESITY (H): ICD-10-CM

## 2020-11-04 DIAGNOSIS — Z11.59 NEED FOR HEPATITIS C SCREENING TEST: ICD-10-CM

## 2020-11-04 DIAGNOSIS — J45.31 MILD PERSISTENT ASTHMA WITH EXACERBATION: ICD-10-CM

## 2020-11-04 DIAGNOSIS — E55.9 VITAMIN D DEFICIENCY: ICD-10-CM

## 2020-11-04 DIAGNOSIS — J45.40 MODERATE PERSISTENT ASTHMA, UNSPECIFIED WHETHER COMPLICATED: ICD-10-CM

## 2020-11-04 DIAGNOSIS — Z11.4 SCREENING FOR HIV (HUMAN IMMUNODEFICIENCY VIRUS): ICD-10-CM

## 2020-11-04 DIAGNOSIS — Q93.81 VELOCARDIOFACIAL SYNDROME: ICD-10-CM

## 2020-11-04 DIAGNOSIS — D69.3 IDIOPATHIC THROMBOCYTOPENIA (H): ICD-10-CM

## 2020-11-04 DIAGNOSIS — F90.9 ATTENTION DEFICIT HYPERACTIVITY DISORDER (ADHD), UNSPECIFIED ADHD TYPE: ICD-10-CM

## 2020-11-04 DIAGNOSIS — F41.1 GAD (GENERALIZED ANXIETY DISORDER): ICD-10-CM

## 2020-11-04 DIAGNOSIS — Z11.3 SCREENING FOR STDS (SEXUALLY TRANSMITTED DISEASES): ICD-10-CM

## 2020-11-04 LAB
BASOPHILS # BLD AUTO: 0 10E9/L (ref 0–0.2)
BASOPHILS NFR BLD AUTO: 0.3 %
DIFFERENTIAL METHOD BLD: NORMAL
EOSINOPHIL # BLD AUTO: 0.1 10E9/L (ref 0–0.7)
EOSINOPHIL NFR BLD AUTO: 0.9 %
ERYTHROCYTE [DISTWIDTH] IN BLOOD BY AUTOMATED COUNT: 13.2 % (ref 10–15)
HBA1C MFR BLD: 5.2 % (ref 0–5.6)
HCT VFR BLD AUTO: 39.3 % (ref 35–47)
HGB BLD-MCNC: 12.8 G/DL (ref 11.7–15.7)
LYMPHOCYTES # BLD AUTO: 1.8 10E9/L (ref 0.8–5.3)
LYMPHOCYTES NFR BLD AUTO: 28.5 %
MCH RBC QN AUTO: 29.1 PG (ref 26.5–33)
MCHC RBC AUTO-ENTMCNC: 32.6 G/DL (ref 31.5–36.5)
MCV RBC AUTO: 89 FL (ref 78–100)
MONOCYTES # BLD AUTO: 0.6 10E9/L (ref 0–1.3)
MONOCYTES NFR BLD AUTO: 8.7 %
NEUTROPHILS # BLD AUTO: 3.9 10E9/L (ref 1.6–8.3)
NEUTROPHILS NFR BLD AUTO: 61.6 %
PLATELET # BLD AUTO: 190 10E9/L (ref 150–450)
RBC # BLD AUTO: 4.4 10E12/L (ref 3.8–5.2)
T4 FREE SERPL-MCNC: 1.02 NG/DL (ref 0.76–1.46)
TSH SERPL DL<=0.005 MIU/L-ACNC: 4.12 MU/L (ref 0.4–4)
WBC # BLD AUTO: 6.4 10E9/L (ref 4–11)

## 2020-11-04 PROCEDURE — 83036 HEMOGLOBIN GLYCOSYLATED A1C: CPT | Performed by: FAMILY MEDICINE

## 2020-11-04 PROCEDURE — 84443 ASSAY THYROID STIM HORMONE: CPT | Performed by: FAMILY MEDICINE

## 2020-11-04 PROCEDURE — 99213 OFFICE O/P EST LOW 20 MIN: CPT | Mod: 25 | Performed by: FAMILY MEDICINE

## 2020-11-04 PROCEDURE — 82306 VITAMIN D 25 HYDROXY: CPT | Performed by: FAMILY MEDICINE

## 2020-11-04 PROCEDURE — 99395 PREV VISIT EST AGE 18-39: CPT | Performed by: FAMILY MEDICINE

## 2020-11-04 PROCEDURE — 36415 COLL VENOUS BLD VENIPUNCTURE: CPT | Performed by: FAMILY MEDICINE

## 2020-11-04 PROCEDURE — 84439 ASSAY OF FREE THYROXINE: CPT | Performed by: FAMILY MEDICINE

## 2020-11-04 PROCEDURE — 85025 COMPLETE CBC W/AUTO DIFF WBC: CPT | Performed by: FAMILY MEDICINE

## 2020-11-04 RX ORDER — NORGESTIMATE AND ETHINYL ESTRADIOL 0.25-0.035
1 KIT ORAL DAILY
Qty: 90 TABLET | Refills: 3 | Status: SHIPPED | OUTPATIENT
Start: 2020-11-04 | End: 2022-07-20

## 2020-11-04 ASSESSMENT — ANXIETY QUESTIONNAIRES
4. TROUBLE RELAXING: NOT AT ALL
GAD7 TOTAL SCORE: 6
6. BECOMING EASILY ANNOYED OR IRRITABLE: SEVERAL DAYS
1. FEELING NERVOUS, ANXIOUS, OR ON EDGE: SEVERAL DAYS
GAD7 TOTAL SCORE: 6
5. BEING SO RESTLESS THAT IT IS HARD TO SIT STILL: SEVERAL DAYS
7. FEELING AFRAID AS IF SOMETHING AWFUL MIGHT HAPPEN: SEVERAL DAYS
GAD7 TOTAL SCORE: 6
3. WORRYING TOO MUCH ABOUT DIFFERENT THINGS: SEVERAL DAYS
7. FEELING AFRAID AS IF SOMETHING AWFUL MIGHT HAPPEN: SEVERAL DAYS
2. NOT BEING ABLE TO STOP OR CONTROL WORRYING: SEVERAL DAYS

## 2020-11-04 ASSESSMENT — ENCOUNTER SYMPTOMS
HEARTBURN: 0
NERVOUS/ANXIOUS: 0
HEADACHES: 1
HEMATOCHEZIA: 0
HEMATURIA: 0
COUGH: 0
CONSTIPATION: 0
SORE THROAT: 0
DYSURIA: 0
ABDOMINAL PAIN: 0
FEVER: 0
DIARRHEA: 0
PALPITATIONS: 0
EYE PAIN: 0
PARESTHESIAS: 0
JOINT SWELLING: 0
SHORTNESS OF BREATH: 0
DIZZINESS: 0
FREQUENCY: 0
WEAKNESS: 0
MYALGIAS: 0
CHILLS: 0
ARTHRALGIAS: 0
NAUSEA: 1

## 2020-11-04 ASSESSMENT — MIFFLIN-ST. JEOR: SCORE: 1765.68

## 2020-11-04 ASSESSMENT — PAIN SCALES - GENERAL: PAINLEVEL: NO PAIN (0)

## 2020-11-04 ASSESSMENT — PATIENT HEALTH QUESTIONNAIRE - PHQ9
SUM OF ALL RESPONSES TO PHQ QUESTIONS 1-9: 9
SUM OF ALL RESPONSES TO PHQ QUESTIONS 1-9: 9
10. IF YOU CHECKED OFF ANY PROBLEMS, HOW DIFFICULT HAVE THESE PROBLEMS MADE IT FOR YOU TO DO YOUR WORK, TAKE CARE OF THINGS AT HOME, OR GET ALONG WITH OTHER PEOPLE: NOT DIFFICULT AT ALL

## 2020-11-05 ENCOUNTER — RADIANT APPOINTMENT (OUTPATIENT)
Dept: CARDIOLOGY | Facility: CLINIC | Age: 20
End: 2020-11-05
Attending: PEDIATRICS
Payer: COMMERCIAL

## 2020-11-05 ENCOUNTER — OFFICE VISIT (OUTPATIENT)
Dept: CARDIOLOGY | Facility: CLINIC | Age: 20
End: 2020-11-05
Payer: COMMERCIAL

## 2020-11-05 VITALS
RESPIRATION RATE: 20 BRPM | DIASTOLIC BLOOD PRESSURE: 119 MMHG | HEIGHT: 63 IN | BODY MASS INDEX: 39.49 KG/M2 | OXYGEN SATURATION: 99 % | SYSTOLIC BLOOD PRESSURE: 171 MMHG | HEART RATE: 99 BPM | WEIGHT: 222.88 LBS

## 2020-11-05 DIAGNOSIS — Q25.40 CONGENITAL ANOMALY OF AORTIC ARCH: Primary | ICD-10-CM

## 2020-11-05 DIAGNOSIS — Q25.40 CONGENITAL ANOMALY OF AORTIC ARCH: ICD-10-CM

## 2020-11-05 LAB — DEPRECATED CALCIDIOL+CALCIFEROL SERPL-MC: 16 UG/L (ref 20–75)

## 2020-11-05 PROCEDURE — 99213 OFFICE O/P EST LOW 20 MIN: CPT | Mod: 25 | Performed by: PEDIATRICS

## 2020-11-05 PROCEDURE — 93303 ECHO TRANSTHORACIC: CPT | Performed by: PEDIATRICS

## 2020-11-05 PROCEDURE — 93325 DOPPLER ECHO COLOR FLOW MAPG: CPT | Performed by: PEDIATRICS

## 2020-11-05 PROCEDURE — 93320 DOPPLER ECHO COMPLETE: CPT | Performed by: PEDIATRICS

## 2020-11-05 RX ORDER — CHOLECALCIFEROL (VITAMIN D3) 50 MCG
1 TABLET ORAL DAILY
Qty: 90 TABLET | Refills: 3 | Status: SHIPPED | OUTPATIENT
Start: 2020-11-05 | End: 2023-06-08

## 2020-11-05 ASSESSMENT — PATIENT HEALTH QUESTIONNAIRE - PHQ9: SUM OF ALL RESPONSES TO PHQ QUESTIONS 1-9: 9

## 2020-11-05 ASSESSMENT — ANXIETY QUESTIONNAIRES: GAD7 TOTAL SCORE: 6

## 2020-11-05 ASSESSMENT — MIFFLIN-ST. JEOR: SCORE: 1753.74

## 2020-11-05 NOTE — PATIENT INSTRUCTIONS
Thank you for choosing [facility]. It was a pleasure to see you for your office visit today.     If you have any questions or scheduling needs during regular office hours, please call our [place] clinic: [phone]   If urgent concerns arise after hours, you can call [phone] and ask to speak to the pediatric specialist on call.   If you need to schedule Radiology tests, please call: [phone]  My Chart messages are for routine communication and questions and are usually answered within 48-72 hours. If you have an urgent concern or require sooner response, please call us.  Outside lab and imaging results should be faxed to [phone].  If you go to a lab outside of [facility] we will not automatically get those results. You will need to ask to have them faxed.       If you had any blood work, imaging or other tests completed today:  Normal test results will be mailed to your home address in a letter.  Abnormal results will be communicated to you via phone call/letter.  Please allow up to 1-2 weeks for processing and interpretation of most lab work.

## 2020-11-05 NOTE — LETTER
"  2020      RE: Heather Lovett   [address] [facility] [address]                                                      PEDS Cardiac Consult Letter  Date: 2020      Debbie Lewis MD  [address] [place] [address]      PATIENT: Heather Lovett  :          2000   MIKE:          2020    Dear Dr. Lewis:    Heather is 20 years old and was seen at the [facility] on 2020.   She is followed with an abnormal aortic arch.  She remains asymptomatic from a cardiac standpoint.  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  She has a new boyfriend.  She has not experienced any chest pain, palpitations or syncope.    On physical examination her height was 1.606 m (5' 3.23\") and her weight was 101.1 kg (222 lb 14.2 oz).  Her heart rate was 99  and respirations 20 per minute.  The blood pressure in her right arm was 88/64 and then her left leg 171/119.  She was acyanotic, warm and well perfused. She was alert cooperative and in no distress.  Her lungs were clear to auscultation without respiratory distress.  She had a regular rhythm with a grade 2/6 systolic ejection murmur at the upper right sternal border.  The second heart sound was physiologically split with a normal pulmonary component.   There was no organomegaly or abdominal tenderness.  Peripheral pulses were 2+ and equal in all extremities.  There was no clubbing or edema.    An echocardiogram performed today that I personally reviewed and explained to her and her boyfriend showed a cervical right aortic arch.  Branching was not evident on today's study.  There is a 30 mm peak and 9 mmHg mean gradient through the cervical arch.  Abdominal aortic flow was normal.  There was no left ventricular hypertrophy.      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  She does not need any activity " restrictions.  I recommend that she return in 2 years for an echocardiogram.  Also recommend that she receive antibiotics for dental care contaminated surgeries as infective endocarditis prophylaxis.    Thank you very much for your confidence in allowing me to participate in Heather's care.  If you have any questions or concerns, please don't hesitate to contact me.    Sincerely,      Del Walters M.D.   Pediatric Cardiology   [facility]  Pediatric Specialty Clinic  [phone]    Note: Chart documentation done in part with Dragon Voice Recognition software. Although reviewed after completion, some word and grammatical errors may remain.       Del Walters MD

## 2020-11-05 NOTE — RESULT ENCOUNTER NOTE
Heatehr, your results were low with vit D. Dr. Rodriguez tried to tell you to take 2000 international unit(s) daily last year to help. Though it isn't on the med list any longer. Will order for you to make sure you get this right, but it is over the counter and you still need it for replacement.  Please let me know if you have any questions.  Debbie Lewis MD

## 2020-11-05 NOTE — PROGRESS NOTES
"                                               [facility]  [facility]: [facility]      Debbie Lewis MD  [address] [place] [address]      PATIENT: Heather Lovett  :          2000   MIKE:          2020    Dear Dr. Lewis:    Heather is 20 years old and was seen at the [facility] on 2020.   She is followed with an abnormal aortic arch.  She remains asymptomatic from a cardiac standpoint.  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  She has a new boyfriend.  She has not experienced any chest pain, palpitations or syncope.    On physical examination her height was 1.606 m (5' 3.23\") and her weight was 101.1 kg (222 lb 14.2 oz).  Her heart rate was 99  and respirations 20 per minute.  The blood pressure in her right arm was 88/64 and then her left leg 171/119.  She was acyanotic, warm and well perfused. She was alert cooperative and in no distress.  Her lungs were clear to auscultation without respiratory distress.  She had a regular rhythm with a grade 2/6 systolic ejection murmur at the upper right sternal border.  The second heart sound was physiologically split with a normal pulmonary component.   There was no organomegaly or abdominal tenderness.  Peripheral pulses were 2+ and equal in all extremities.  There was no clubbing or edema.    An echocardiogram performed today that I personally reviewed and explained to her and her boyfriend showed a cervical right aortic arch.  Branching was not evident on today's study.  There is a 30 mm peak and 9 mmHg mean gradient through the cervical arch.  Abdominal aortic flow was normal.  There was no left ventricular hypertrophy.      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  She does not need any activity restrictions.  I recommend that she return in 2 years for an echocardiogram.  Also recommend that " she receive antibiotics for dental care contaminated surgeries as infective endocarditis prophylaxis.    Thank you very much for your confidence in allowing me to participate in Heather's care.  If you have any questions or concerns, please don't hesitate to contact me.    Sincerely,      Del Walters M.D.   Pediatric Cardiology   [facility]  Pediatric Specialty Clinic  [phone]    Note: Chart documentation done in part with Dragon Voice Recognition software. Although reviewed after completion, some word and grammatical errors may remain.

## 2020-11-05 NOTE — RESULT ENCOUNTER NOTE
Heather, your results were mostly normal. The thyroid was still a little off, but not far enough to cause symptoms and improved from last year. Will need to keep an eye on this annually. Vitamin D is still pending. Please let me know if you have any questions.  Debbie Lewis MD

## 2020-11-05 NOTE — RESULT ENCOUNTER NOTE
HANANEI, Dr. Rodriugez must have notifications still turned on. Tried to turn off, may have to look again if they still are going to peds.  Debbie Lewis MD

## 2020-11-05 NOTE — LETTER
11/5/2020      RE: Heather Lovett  [address] [facility] [address]       No notes on file    Del Walters MD

## 2021-01-09 ENCOUNTER — HEALTH MAINTENANCE LETTER (OUTPATIENT)
Age: 21
End: 2021-01-09

## 2021-04-06 ENCOUNTER — OFFICE VISIT (OUTPATIENT)
Dept: FAMILY MEDICINE | Facility: OTHER | Age: 21
End: 2021-04-06
Payer: COMMERCIAL

## 2021-04-06 VITALS
DIASTOLIC BLOOD PRESSURE: 68 MMHG | RESPIRATION RATE: 22 BRPM | HEART RATE: 88 BPM | OXYGEN SATURATION: 98 % | TEMPERATURE: 99.6 F | SYSTOLIC BLOOD PRESSURE: 118 MMHG

## 2021-04-06 DIAGNOSIS — R53.81 MALAISE AND FATIGUE: Primary | ICD-10-CM

## 2021-04-06 DIAGNOSIS — R50.9 FEVER, UNSPECIFIED: ICD-10-CM

## 2021-04-06 DIAGNOSIS — R07.0 THROAT PAIN: ICD-10-CM

## 2021-04-06 DIAGNOSIS — D69.3 IDIOPATHIC THROMBOCYTOPENIA (H): ICD-10-CM

## 2021-04-06 DIAGNOSIS — R53.83 MALAISE AND FATIGUE: Primary | ICD-10-CM

## 2021-04-06 DIAGNOSIS — E66.01 MORBID OBESITY (H): ICD-10-CM

## 2021-04-06 DIAGNOSIS — H65.93 BILATERAL NON-SUPPURATIVE OTITIS MEDIA: ICD-10-CM

## 2021-04-06 LAB
DEPRECATED S PYO AG THROAT QL EIA: NEGATIVE
SPECIMEN SOURCE: NORMAL
SPECIMEN SOURCE: NORMAL
STREP GROUP A PCR: NOT DETECTED

## 2021-04-06 PROCEDURE — 87651 STREP A DNA AMP PROBE: CPT | Performed by: PHYSICIAN ASSISTANT

## 2021-04-06 PROCEDURE — 99214 OFFICE O/P EST MOD 30 MIN: CPT | Performed by: PHYSICIAN ASSISTANT

## 2021-04-06 PROCEDURE — U0003 INFECTIOUS AGENT DETECTION BY NUCLEIC ACID (DNA OR RNA); SEVERE ACUTE RESPIRATORY SYNDROME CORONAVIRUS 2 (SARS-COV-2) (CORONAVIRUS DISEASE [COVID-19]), AMPLIFIED PROBE TECHNIQUE, MAKING USE OF HIGH THROUGHPUT TECHNOLOGIES AS DESCRIBED BY CMS-2020-01-R: HCPCS | Performed by: PHYSICIAN ASSISTANT

## 2021-04-06 PROCEDURE — U0005 INFEC AGEN DETEC AMPLI PROBE: HCPCS | Performed by: PHYSICIAN ASSISTANT

## 2021-04-06 PROCEDURE — 99N1174 PR STATISTIC STREP A RAPID: Performed by: PHYSICIAN ASSISTANT

## 2021-04-06 RX ORDER — AZITHROMYCIN 250 MG/1
TABLET, FILM COATED ORAL
Qty: 6 TABLET | Refills: 0 | Status: SHIPPED | OUTPATIENT
Start: 2021-04-06 | End: 2021-10-15

## 2021-04-06 NOTE — PROGRESS NOTES
Assessment & Plan     Morbid obesity (H)  Idiopathic thrombocytopenia (H)  Historically noted no new concerns follow-up with primary care provider strongly recommended.    Malaise and fatigue  Throat pain  Bilateral non-suppurative otitis media  Fever, unspecified  We will begin treatment for bilateral ear infections that she has by physical exam today.  We will follow-up based on overall tests that are being in process.  Must admit that her COVID-19 sampling was less than ideal but I think there is enough to process.  - Streptococcus A Rapid Scr w Reflx to PCR  - Symptomatic COVID-19 Virus (Coronavirus) by PCR; Future  - azithromycin (ZITHROMAX) 250 MG tablet; Two tablets first day, then one tablet daily for four days      No follow-ups on file.    [facility]    David Romero is a 21 year old who presents for the following health issues   HPI     Acute Illness  Acute illness concerns: strep throat  Onset/Duration: 1 day  Symptoms:  Fever: YES  Chills/Sweats: YES  Headache (location?): YES  Sinus Pressure: no  Conjunctivitis:  no  Ear Pain: no  Rhinorrhea: no  Congestion: no  Sore Throat: YES  Cough: YES-non-productive  Wheeze: YES  Decreased Appetite: YES  Nausea: no  Vomiting: no  Diarrhea: no  Dysuria/Freq.: no  Dysuria or Hematuria: no  Fatigue/Achiness: no  Sick/Strep Exposure: no  Therapies tried and outcome: None      Review of Systems   Constitutional, HEENT, cardiovascular, pulmonary, GI, , musculoskeletal, neuro, skin, endocrine and psych systems are negative, except as otherwise noted.      Objective    /68   Pulse 88   Temp 99.6  F (37.6  C) (Temporal)   Resp 22   SpO2 98%   There is no height or weight on file to calculate BMI.  Physical Exam   GENERAL: healthy, alert and no distress  HENT: normal cephalic/atraumatic, both ears: erythematous and mucopurulent effusion, nose and mouth without ulcers or lesions, nasal mucosa edematous ,  rhinorrhea clear, oropharynx clear, oral mucous membranes moist and tonsillar erythema, sampling is done today however she does try to stop me and I am not certain about true sampling integrity.  NECK: bilateral anterior cervical adenopathy, no asymmetry, masses, or scars and thyroid normal to palpation  RESP: lungs clear to auscultation - no rales, rhonchi or wheezes  CV: regular rate and rhythm, normal S1 S2, no S3 or S4, no murmur, click or rub, no peripheral edema and peripheral pulses strong  ABDOMEN: soft, nontender, no hepatosplenomegaly, no masses and bowel sounds normal  MS: no gross musculoskeletal defects noted, no edema  SKIN: no suspicious lesions or rashes to visible skin today.  NEURO: Normal strength and tone, mentation intact and speech normal  PSYCH: anxious and fatigued    No results found for this or any previous visit (from the past 24 hour(s)).

## 2021-04-07 LAB
SARS-COV-2 RNA RESP QL NAA+PROBE: NOT DETECTED
SPECIMEN SOURCE: NORMAL

## 2021-05-03 ENCOUNTER — TELEPHONE (OUTPATIENT)
Dept: FAMILY MEDICINE | Facility: OTHER | Age: 21
End: 2021-05-03

## 2021-05-03 NOTE — TELEPHONE ENCOUNTER
Patient Quality Outreach Summary      Summary:    Patient is due/failing the following:   ACT needed    Type of outreach:    Sent [email or web address] message.    Questions for provider review:    None                                                                                                                    Vida Correa CMA       Chart routed to Care Team.

## 2021-05-18 NOTE — PROGRESS NOTES
Assessment & Plan     RUQ abdominal pain  Low suspicion this is due to gallbladder based on her history.  Question GERD as she notes nausea and vomiting and symptoms mainly at night.  Could also be related to her asthma which isn't well controlled.  However she is tender to palpation in the right lower rib region and this is the pain she experiences so question if this is actually a musculoskeletal cause.  We will check liver lab which was normal.  She would like an ultrasound done which we will complete as well. If negative recommend treating for potential GERD and optimizing asthma treatment, she is already noting improvement in symptoms so may need to give it more time as well and recheck at med re-check.   - Hepatic panel (Albumin, ALT, AST, Bili, Alk Phos, TP)  - US Abdomen Limited; Future    Mild persistent asthma with exacerbation  Not well controlled  Recommended starting on Advair.   Recheck in 4 weeks.   - fluticasone-salmeterol (ADVAIR) 500-50 MCG/DOSE inhaler; Inhale 1 puff into the lungs every 12 hours    Attention deficit hyperactivity disorder (ADHD), unspecified ADHD type  She would like to restart medication.   Discussed how anxiety/depression symptoms can worsen ADHD as well so we will want to closely monitor.    reviewed 05/19/21.  No Rx in the last year.   We will restart her on the same dose of Concerta.   - methylphenidate (CONCERTA) 36 MG CR tablet; Take 1 tablet (36 mg) by mouth every morning       Return in about 4 weeks (around 6/16/2021) for Medication Re-check, If not improving, sooner if worse or new concerns.      Options for treatment and follow-up care were reviewed with the patient and/or guardian. Patient and/or guardian engaged in the decision making process and verbalized understanding of the options discussed and agreed with the final plan.    Mallorie Rowe PA-C  [facility] REGLA Romero is a 21 year old who presents for the following  health issues  accompanied by her mother:    History of Present Illness       She eats 0-1 servings of fruits and vegetables daily.She consumes 1 sweetened beverage(s) daily.She exercises with enough effort to increase her heart rate 9 or less minutes per day.  She exercises with enough effort to increase her heart rate 3 or less days per week.        Abdominal/Flank Pain  Onset/Duration: 1 year   Description:   Character: Sharp  Location: right upper quadrant  Radiation: None  Intensity: severe  Progression of Symptoms:  intermittent  Accompanying Signs & Symptoms:  Fever/Chills: no  Gas/Bloating: YES  Nausea: YES  Vomitting: YES  Diarrhea: no  Constipation: no  Dysuria or Hematuria: no  History:   Trauma: no  Previous similar pain: YES  Previous tests done: none  Precipitating factors:   Does the pain change with:     Food: YES    Bowel Movement: no    Urination: no   Other factors:  no  Therapies tried and outcome: None  No LMP recorded.    - Has noticed mainly at night that she will have right side pain.  It will make her feel nauseated and then she will throw up.  It has been better just recently where she isn't vomiting.    - This doesn't really happen during the day.   - Nothing makes the pain worse. She can eat without having symptoms.   - No heart burn symptoms as far as she is aware.   - No constipation or diarrhea, no blood in stool, no urinary symptoms.   - No recent diet changes.   - No history of abdominal surgeries.   - No fevers or chills.     Asthma:  - She needs refill on inhaler. Doesn't think she has been taking the Advair.   - Mother thinks she has been wheezing a lot  - ACT score of 17 today. .     ADHD:  - She has been off medication for a while but they are noticing more inability to complete tasks, she is not focused.   - They would like to restart this  - She admits to some anxiety symptoms but more concerned with ADHD today.       Review of Systems   Constitutional, HEENT, cardiovascular,  pulmonary, GI, , musculoskeletal, neuro, skin, endocrine and psych systems are negative, except as otherwise noted.      Objective    /88   Pulse 92   Temp 99.7  F (37.6  C) (Temporal)   Resp 14   Wt 104.7 kg (230 lb 12.8 oz)   SpO2 97%   BMI 40.59 kg/m    Body mass index is 40.59 kg/m .  Physical Exam   GENERAL: healthy, alert and no distress  EYES: Eyes grossly normal to inspection, PERRL and conjunctivae and sclerae normal  RESP: lungs clear to auscultation - no rales, rhonchi or wheezes  CV: regular rate and rhythm, normal S1 S2, no S3 or S4, no murmur, click or rub, no peripheral edema and peripheral pulses strong  ABDOMEN: soft, nontender, no hepatosplenomegaly, no masses and bowel sounds normal, negative Valentine  MS: no gross musculoskeletal defects noted, no edema.  Tenderness to palpation along the right side inferior ribs lateral to the anterior region without masses.   SKIN: no suspicious lesions or rashes  PSYCH: mentation appears normal, affect normal/bright    Results for orders placed or performed in visit on 05/19/21   Hepatic panel (Albumin, ALT, AST, Bili, Alk Phos, TP)     Status: None   Result Value Ref Range    Bilirubin Direct <0.1 0.0 - 0.2 mg/dL    Bilirubin Total 0.2 0.2 - 1.3 mg/dL    Albumin 3.5 3.4 - 5.0 g/dL    Protein Total 7.3 6.8 - 8.8 g/dL    Alkaline Phosphatase 79 40 - 150 U/L    ALT 21 0 - 50 U/L    AST 12 0 - 45 U/L

## 2021-05-19 ENCOUNTER — OFFICE VISIT (OUTPATIENT)
Dept: FAMILY MEDICINE | Facility: OTHER | Age: 21
End: 2021-05-19
Payer: COMMERCIAL

## 2021-05-19 VITALS
OXYGEN SATURATION: 97 % | TEMPERATURE: 99.7 F | RESPIRATION RATE: 14 BRPM | WEIGHT: 230.8 LBS | HEART RATE: 92 BPM | BODY MASS INDEX: 40.59 KG/M2 | DIASTOLIC BLOOD PRESSURE: 88 MMHG | SYSTOLIC BLOOD PRESSURE: 123 MMHG

## 2021-05-19 DIAGNOSIS — F90.9 ATTENTION DEFICIT HYPERACTIVITY DISORDER (ADHD), UNSPECIFIED ADHD TYPE: ICD-10-CM

## 2021-05-19 DIAGNOSIS — R10.11 RUQ ABDOMINAL PAIN: Primary | ICD-10-CM

## 2021-05-19 DIAGNOSIS — J45.31 MILD PERSISTENT ASTHMA WITH EXACERBATION: ICD-10-CM

## 2021-05-19 LAB
ALBUMIN SERPL-MCNC: 3.5 G/DL (ref 3.4–5)
ALP SERPL-CCNC: 79 U/L (ref 40–150)
ALT SERPL W P-5'-P-CCNC: 21 U/L (ref 0–50)
AST SERPL W P-5'-P-CCNC: 12 U/L (ref 0–45)
BILIRUB DIRECT SERPL-MCNC: <0.1 MG/DL (ref 0–0.2)
BILIRUB SERPL-MCNC: 0.2 MG/DL (ref 0.2–1.3)
PROT SERPL-MCNC: 7.3 G/DL (ref 6.8–8.8)

## 2021-05-19 PROCEDURE — 99214 OFFICE O/P EST MOD 30 MIN: CPT | Performed by: PHYSICIAN ASSISTANT

## 2021-05-19 PROCEDURE — 36415 COLL VENOUS BLD VENIPUNCTURE: CPT | Performed by: PHYSICIAN ASSISTANT

## 2021-05-19 PROCEDURE — 80076 HEPATIC FUNCTION PANEL: CPT | Performed by: PHYSICIAN ASSISTANT

## 2021-05-19 RX ORDER — METHYLPHENIDATE HYDROCHLORIDE 36 MG/1
36 TABLET ORAL EVERY MORNING
Qty: 30 TABLET | Refills: 0 | Status: SHIPPED | OUTPATIENT
Start: 2021-05-19 | End: 2021-06-18

## 2021-05-19 NOTE — PATIENT INSTRUCTIONS
- Start using the Advair inhaler daily.   - Start the Concerta tomorrow.   - We will let you know the results of the lab and the imaging a

## 2021-05-20 ASSESSMENT — ASTHMA QUESTIONNAIRES: ACT_TOTALSCORE: 17

## 2021-05-25 ENCOUNTER — MYC MEDICAL ADVICE (OUTPATIENT)
Dept: FAMILY MEDICINE | Facility: OTHER | Age: 21
End: 2021-05-25

## 2021-05-25 ENCOUNTER — ANCILLARY PROCEDURE (OUTPATIENT)
Dept: ULTRASOUND IMAGING | Facility: OTHER | Age: 21
End: 2021-05-25
Attending: PHYSICIAN ASSISTANT
Payer: COMMERCIAL

## 2021-05-25 DIAGNOSIS — R10.11 RUQ ABDOMINAL PAIN: ICD-10-CM

## 2021-05-26 ENCOUNTER — TELEPHONE (OUTPATIENT)
Dept: NEPHROLOGY | Facility: CLINIC | Age: 21
End: 2021-05-26

## 2021-05-26 DIAGNOSIS — N26.1 KIDNEY ATROPHY: Primary | ICD-10-CM

## 2021-05-26 NOTE — TELEPHONE ENCOUNTER
Mallorie, are you able to help answer the patient's question?   Review the US completed on 05/25/21.     Meir Villafana RN, BSN  [place]  May 26, 2021

## 2021-05-26 NOTE — TELEPHONE ENCOUNTER
I do not have an answer for this but in my result note I recommended seeing nephrology if they are open to it.     Mallorie Rowe PA-C

## 2021-05-26 NOTE — TELEPHONE ENCOUNTER
[facility] Call Center    Phone Message    May a detailed message be left on voicemail: yes     Reason for Call: Other: Patient needs assist with scheduling an appointment with the diagnosis of Kidney atrophy , please call patient mother at [phone] to assist.    Action Taken: Message routed to:  [facility] YOJANA Garcia    Travel Screening: Not Applicable

## 2021-05-27 ENCOUNTER — PRE VISIT (OUTPATIENT)
Dept: UROLOGY | Facility: CLINIC | Age: 21
End: 2021-05-27

## 2021-06-01 ENCOUNTER — PRE VISIT (OUTPATIENT)
Dept: UROLOGY | Facility: CLINIC | Age: 21
End: 2021-06-01

## 2021-06-11 ENCOUNTER — VIRTUAL VISIT (OUTPATIENT)
Dept: UROLOGY | Facility: CLINIC | Age: 21
End: 2021-06-11
Payer: COMMERCIAL

## 2021-06-11 DIAGNOSIS — N26.1 KIDNEY ATROPHY: ICD-10-CM

## 2021-06-11 DIAGNOSIS — R10.9 FLANK PAIN: Primary | ICD-10-CM

## 2021-06-11 PROCEDURE — 99203 OFFICE O/P NEW LOW 30 MIN: CPT | Mod: 95 | Performed by: UROLOGY

## 2021-06-11 NOTE — LETTER
6/11/2021       RE: Heather Lovett  [address]  [place] [address]     Dear Colleague,    Thank you for referring your patient, Heather Lovett, to the [facility] [place] at [facility]. Please see a copy of my visit note below.      Urology Clinic    Assessment and Plan  Possible right renal atrophy and right upper quadrant pain which is not necessarily characteristic of renal cause.    Orders Placed This Encounter   Procedures     CT Abdomen Pelvis w/o Contrast     Basic metabolic panel     UA with Microscopic reflex to Culture ([place]; [facility])         Follow-up in 4-6 weeks to review results.  __________________________________________________    HPI  She is referred by Mallorie Rowe for possible atrophic right kidney.  She presented with right upper quadrant pain and tenderness on palpation.  Ms Rowe ordered the ultrasound which showed the atrophy.  She was also working up asthma and GERD as possible causes.  I reviewed her 5/19/21 note.      5/25/21 renal ultrasound  I reviewed the radiologic images and report from this radiologic exam.  My independent interpretation is:    Slightly small right kidney    Radiologist Impression  2.  Right kidney appears small in size with renal cortical thinning  concerning for atrophy. This measures slightly smaller than on a prior  ultrasound of the kidneys from 1/4/2012.      I reviewed the following labs  I reviewed the following laboratory data and went over findings with patient:  Recent Labs   Lab Test 11/04/20  1121 10/25/19  1252 08/18/17  1722 11/07/16  1542   WBC 6.4 5.7 4.7 5.9   HGB 12.8 13.0 14.3 15.5    170 162 155     Recent Labs   Lab Test 10/25/19  1252 08/18/17  1722 11/07/16  1542 07/20/16  1422   CR 0.67 0.98 0.77 0.75   GFRESTIMATED >90 74 >90  Non  GFR Calc   >90  Non  GFR Calc     GFRESTBLACK >90 90 >90    American GFR Calc   >90   GFR Calc     GLC 77 93 117* 86       Video-Visit Details  Originating Location (pt. Location): Home.    Distant Location (provider location):  [facility].  Platform used for Video Visit: Doxy  Video Start Time: 234p  Video End Time: 250  Time spent on pre-visit work, post visit work, and documentation on day of visit outside of time during video visit: 0 min  Total time on the day of the visit: 16 min            CC  Patient Care Team:  Debbie Lewis MD as PCP - General (Family Medicine)  Esme Low MD as Consulting Physician  Debbie Lewis MD as Assigned PCP  Amauri Pickens MD as MD (Urology)  Cherelle Mendoza, RN as Registered Nurse (Oncology)  SHABBIR KWONG    Copy to patient  STEVE ESTRADA  [address] [facility] [address]

## 2021-06-11 NOTE — NURSING NOTE
Chief Complaint   Patient presents with     Consult     atrophic kidney       Patient Active Problem List   Diagnosis     Congenital anomaly of aortic arch     Mild persistent asthma with exacerbation     Velocardiofacial syndrome     Health Care Home     Idiopathic thrombocytopenia (H)     Mild intellectual disability     H/O: iron deficiency anemia     Attention deficit hyperactivity disorder (ADHD), unspecified ADHD type     Speech articulation disorder     Cutis marmorata     Hearing loss, unspecified hearing loss type, L     H/O vitamin D deficiency     Low calcium levels     Moderate persistent asthma, unspecified whether complicated     Lactose intolerance     Rapid weight gain     JOHN (generalized anxiety disorder)     Elevated TSH     Morbid obesity (H)       Allergies   Allergen Reactions     No Known Drug Allergies        Current Outpatient Medications   Medication Sig Dispense Refill     albuterol (PROAIR HFA) 108 (90 Base) MCG/ACT inhaler Inhale 2 puffs into the lungs every 4 hours as needed for shortness of breath / dyspnea (cough, wheezing or shortness of breath) 2 Inhaler 4     albuterol (PROVENTIL) (2.5 MG/3ML) 0.083% neb solution Take 1 vial (2.5 mg) by nebulization every 4 hours as needed for shortness of breath / dyspnea 1 Box 11     azithromycin (ZITHROMAX) 250 MG tablet Two tablets first day, then one tablet daily for four days 6 tablet 0     fluticasone-salmeterol (ADVAIR) 500-50 MCG/DOSE inhaler Inhale 1 puff into the lungs every 12 hours 1 each 5     lactase (LACTAID) 3000 UNIT tablet Take 1 tablet (3,000 Units) by mouth 3 times daily (with meals) 100 tablet 3     methylphenidate (CONCERTA) 36 MG CR tablet Take 1 tablet (36 mg) by mouth every morning 30 tablet 0     norgestimate-ethinyl estradiol (ORTHO-CYCLEN) 0.25-35 MG-MCG tablet Take 1 tablet by mouth daily 90 tablet 3     ondansetron (ZOFRAN) 8 MG tablet Take 1 tablet (8 mg) by mouth every 8 hours as needed for nausea 15 tablet 0      spacer (OPTICHAMBER GA) holding chamber Use with inhaler 1 each 11     Spacer/Aero-Holding Chambers (OPTICHAMBER ADVANTAGE-LG MASK) MISC 1 Device as needed 2 each 3     Spacer/Aero-Holding Chambers (OPTICHAMBER GA-LG MASK) BUFFY 1 Device as needed 1 each 3     vitamin D3 (CHOLECALCIFEROL) 50 mcg (2000 units) tablet Take 1 tablet (50 mcg) by mouth daily 90 tablet 3       Social History     Tobacco Use     Smoking status: Never Smoker     Smokeless tobacco: Never Used   Substance Use Topics     Alcohol use: No     Alcohol/week: 0.0 standard drinks     Drug use: No       Nadege Unger LPN  6/11/2021  2:10 PM

## 2021-06-11 NOTE — PROGRESS NOTES
Urology Clinic    Assessment and Plan  Possible right renal atrophy and right upper quadrant pain which is not necessarily characteristic of renal cause.    Orders Placed This Encounter   Procedures     CT Abdomen Pelvis w/o Contrast     Basic metabolic panel     UA with Microscopic reflex to Culture ([place]; [facility])         Follow-up in 4-6 weeks to review results.  __________________________________________________    HPI  She is referred by Mallorie Rowe for possible atrophic right kidney.  She presented with right upper quadrant pain and tenderness on palpation.  Ms Jae ordered the ultrasound which showed the atrophy.  She was also working up asthma and GERD as possible causes.  I reviewed her 5/19/21 note.      5/25/21 renal ultrasound  I reviewed the radiologic images and report from this radiologic exam.  My independent interpretation is:    Slightly small right kidney    Radiologist Impression  2.  Right kidney appears small in size with renal cortical thinning  concerning for atrophy. This measures slightly smaller than on a prior  ultrasound of the kidneys from 1/4/2012.      I reviewed the following labs  I reviewed the following laboratory data and went over findings with patient:  Recent Labs   Lab Test 11/04/20  1121 10/25/19  1252 08/18/17  1722 11/07/16  1542   WBC 6.4 5.7 4.7 5.9   HGB 12.8 13.0 14.3 15.5    170 162 155     Recent Labs   Lab Test 10/25/19  1252 08/18/17  1722 11/07/16  1542 07/20/16  1422   CR 0.67 0.98 0.77 0.75   GFRESTIMATED >90 74 >90  Non  GFR Calc   >90  Non  GFR Calc     GFRESTBLACK >90 90 >90   GFR Calc   >90   GFR Calc     GLC 77 93 117* 86       Video-Visit Details  Originating Location (pt. Location): Home.    Distant Location (provider location):  [facility].  Platform used for Video Visit: Doxy  Video Start Time: 234p  Video End Time: 250  Time spent on pre-visit work,  post visit work, and documentation on day of visit outside of time during video visit: 0 min  Total time on the day of the visit: 16 min            CC  Patient Care Team:  Debbie Lewis MD as PCP - General (Family Medicine)  Esme Low MD as Consulting Physician  Debbie Lewis MD as Assigned PCP  Amauri Pickens MD as MD (Urology)  Cherelle Mendoza, RN as Registered Nurse (Oncology)  SHABBIR KWONG    Copy to patient  STEVE LEVYALEXEY  [address] [facility] [address]

## 2021-06-11 NOTE — PROGRESS NOTES
"Heather is a 21 year old who is being evaluated via a billable video visit.      How would you like to obtain your AVS? MyChart  If the video visit is dropped, the invitation should be resent by: Text to cell phone: [phone]  Will anyone else be joining your video visit? {:[id number]}  {If patient encounters technical issues they should call [phone] :[id number]}    Video Start Time: {video visit start/end time for provider to select:152948}  Video-Visit Details    Type of service:  Video Visit    Video End Time:{video visit start/end time for provider to select:688658}    Originating Location (pt. Location): {video visit patient location:210293::\"Home\"}    Distant Location (provider location):  [facility] [place]     Platform used for Video Visit: {Virtual Visit Platforms:459250::\"HOSTING\"}        "

## 2021-06-28 NOTE — PROGRESS NOTES
"Assessment & Plan     Tendonitis of ankle  Ankle x-ray is normal. Symptoms consistent with tibial tendonitis and will treat with walking boot for the next 2-6 weeks advising her to wear the boot until she is able to walk without pain. s  - XR Ankle Right G/E 3 Views; Future  - Urine Microscopic    Flank pain  Released urology orders  - Basic metabolic panel  - *UA reflex to Microscopic and Culture ([place] and [facility] (except [place] and [place])      BMI:   Estimated body mass index is 40.14 kg/m  as calculated from the following:    Height as of 11/5/20: 1.606 m (5' 3.23\").    Weight as of this encounter: 103.5 kg (228 lb 4 oz).   Weight management plan: Discussed healthy diet and exercise guidelines    Patient Instructions   Wear the walking boot for the next 2-6 weeks. You can stop wearing the boot when you can walk without pain.    Ice the ankle for 20 minutes 2-4 times a day.    Elevate the ankle when you are able.     Ibuprofen 600 mg twice daily with food for 10 days.    X-ray today.    Hamida Patten CNP            No follow-ups on file.    Etelvina Patten, SAM CNP  [facility]     Heather Lovett is a 21 year old female who presents to clinic today for the following health issues accompanied by her friend:    History of Present Illness       She eats 0-1 servings of fruits and vegetables daily.She consumes 2 sweetened beverage(s) daily.She exercises with enough effort to increase her heart rate 9 or less minutes per day.  She exercises with enough effort to increase her heart rate 3 or less days per week.   She is taking medications regularly.       Musculoskeletal problem/pain  Onset/Duration: 2 weeks   Description  Location: Ankle  - right  Joint Swelling: YES  Redness: YES  Pain: YES  Warmth: Not sure, But right now its not  Intensity:  moderate  Progression of Symptoms:  worsening  Accompanying signs and symptoms:   Fevers: " no  Numbness/tingling/weakness: no  History  Trauma to the area: no  Recent illness:  no  Previous similar problem: no  Previous evaluation:  no  Precipitating or alleviating factors:  Aggravating factors include: Any kind of pressure   Therapies tried and outcome: heat, ice and Ankle brace         Review of Systems   Constitutional, HEENT, cardiovascular, pulmonary, gi and gu systems are negative, except as otherwise noted.      Objective    /64   Pulse 99   Temp 97.9  F (36.6  C) (Temporal)   Resp 18   Wt 103.5 kg (228 lb 4 oz)   LMP  (LMP Unknown)   SpO2 96%   BMI 40.14 kg/m    Body mass index is 40.14 kg/m .  Physical Exam   GENERAL APPEARANCE: alert and no distress  MS: localized swelling of the right ankle anterior to the lateral malleolus, no tenderness to palpation, mildly antalgic gait  SKIN: no suspicious lesions or rashes  PSYCH: mentation appears normal and affect normal/bright    Xr Ankle Right G/e 3 Views    Result Date: 6/29/2021  ANKLE RIGHT THREE OR MORE VIEWS   6/29/2021 6:01 PM HISTORY: Lateral ankle pain x 2 weeks; Pain in joint, ankle and foot, right. COMPARISON: None.     IMPRESSION: No evidence of fracture. Mortise and talar dome are intact. TORREY DALEY MD      Answers for HPI/ROS submitted by the patient on 6/29/2021   Chronic problems general questions HPI Form  JOHN 7 TOTAL SCORE: 5

## 2021-06-29 ENCOUNTER — ANCILLARY PROCEDURE (OUTPATIENT)
Dept: GENERAL RADIOLOGY | Facility: OTHER | Age: 21
End: 2021-06-29
Attending: STUDENT IN AN ORGANIZED HEALTH CARE EDUCATION/TRAINING PROGRAM
Payer: COMMERCIAL

## 2021-06-29 ENCOUNTER — OFFICE VISIT (OUTPATIENT)
Dept: FAMILY MEDICINE | Facility: OTHER | Age: 21
End: 2021-06-29
Payer: COMMERCIAL

## 2021-06-29 VITALS
RESPIRATION RATE: 18 BRPM | HEART RATE: 99 BPM | TEMPERATURE: 97.9 F | BODY MASS INDEX: 40.14 KG/M2 | DIASTOLIC BLOOD PRESSURE: 64 MMHG | OXYGEN SATURATION: 96 % | SYSTOLIC BLOOD PRESSURE: 118 MMHG | WEIGHT: 228.25 LBS

## 2021-06-29 DIAGNOSIS — M25.571 PAIN IN JOINT, ANKLE AND FOOT, RIGHT: ICD-10-CM

## 2021-06-29 DIAGNOSIS — M77.50 TENDONITIS OF ANKLE: Primary | ICD-10-CM

## 2021-06-29 DIAGNOSIS — R10.9 FLANK PAIN: ICD-10-CM

## 2021-06-29 LAB
ALBUMIN UR-MCNC: NEGATIVE MG/DL
ANION GAP SERPL CALCULATED.3IONS-SCNC: 3 MMOL/L (ref 3–14)
APPEARANCE UR: CLEAR
BACTERIA #/AREA URNS HPF: ABNORMAL /HPF
BILIRUB UR QL STRIP: NEGATIVE
BUN SERPL-MCNC: 9 MG/DL (ref 7–30)
CALCIUM SERPL-MCNC: 8.9 MG/DL (ref 8.5–10.1)
CHLORIDE SERPL-SCNC: 108 MMOL/L (ref 94–109)
CO2 SERPL-SCNC: 29 MMOL/L (ref 20–32)
COLOR UR AUTO: YELLOW
CREAT SERPL-MCNC: 0.79 MG/DL (ref 0.52–1.04)
GFR SERPL CREATININE-BSD FRML MDRD: >90 ML/MIN/{1.73_M2}
GLUCOSE SERPL-MCNC: 85 MG/DL (ref 70–99)
GLUCOSE UR STRIP-MCNC: NEGATIVE MG/DL
HGB UR QL STRIP: ABNORMAL
KETONES UR STRIP-MCNC: NEGATIVE MG/DL
LEUKOCYTE ESTERASE UR QL STRIP: ABNORMAL
NITRATE UR QL: NEGATIVE
NON-SQ EPI CELLS #/AREA URNS LPF: ABNORMAL /LPF
PH UR STRIP: 5.5 PH (ref 5–7)
POTASSIUM SERPL-SCNC: 3.7 MMOL/L (ref 3.4–5.3)
RBC #/AREA URNS AUTO: ABNORMAL /HPF
SODIUM SERPL-SCNC: 140 MMOL/L (ref 133–144)
SOURCE: ABNORMAL
SP GR UR STRIP: 1.02 (ref 1–1.03)
UROBILINOGEN UR STRIP-ACNC: 0.2 EU/DL (ref 0.2–1)
WBC #/AREA URNS AUTO: ABNORMAL /HPF

## 2021-06-29 PROCEDURE — 81001 URINALYSIS AUTO W/SCOPE: CPT | Performed by: STUDENT IN AN ORGANIZED HEALTH CARE EDUCATION/TRAINING PROGRAM

## 2021-06-29 PROCEDURE — 80048 BASIC METABOLIC PNL TOTAL CA: CPT | Performed by: UROLOGY

## 2021-06-29 PROCEDURE — 73610 X-RAY EXAM OF ANKLE: CPT | Mod: RT | Performed by: RADIOLOGY

## 2021-06-29 PROCEDURE — 36415 COLL VENOUS BLD VENIPUNCTURE: CPT | Performed by: UROLOGY

## 2021-06-29 PROCEDURE — 99213 OFFICE O/P EST LOW 20 MIN: CPT | Performed by: STUDENT IN AN ORGANIZED HEALTH CARE EDUCATION/TRAINING PROGRAM

## 2021-06-29 ASSESSMENT — ANXIETY QUESTIONNAIRES
7. FEELING AFRAID AS IF SOMETHING AWFUL MIGHT HAPPEN: NOT AT ALL
5. BEING SO RESTLESS THAT IT IS HARD TO SIT STILL: NOT AT ALL
3. WORRYING TOO MUCH ABOUT DIFFERENT THINGS: SEVERAL DAYS
GAD7 TOTAL SCORE: 5
6. BECOMING EASILY ANNOYED OR IRRITABLE: SEVERAL DAYS
1. FEELING NERVOUS, ANXIOUS, OR ON EDGE: MORE THAN HALF THE DAYS
7. FEELING AFRAID AS IF SOMETHING AWFUL MIGHT HAPPEN: NOT AT ALL
4. TROUBLE RELAXING: NOT AT ALL
2. NOT BEING ABLE TO STOP OR CONTROL WORRYING: SEVERAL DAYS
GAD7 TOTAL SCORE: 5

## 2021-06-29 ASSESSMENT — ASTHMA QUESTIONNAIRES
QUESTION_3 LAST FOUR WEEKS HOW OFTEN DID YOUR ASTHMA SYMPTOMS (WHEEZING, COUGHING, SHORTNESS OF BREATH, CHEST TIGHTNESS OR PAIN) WAKE YOU UP AT NIGHT OR EARLIER THAN USUAL IN THE MORNING: ONCE OR TWICE
QUESTION_1 LAST FOUR WEEKS HOW MUCH OF THE TIME DID YOUR ASTHMA KEEP YOU FROM GETTING AS MUCH DONE AT WORK, SCHOOL OR AT HOME: A LITTLE OF THE TIME
QUESTION_2 LAST FOUR WEEKS HOW OFTEN HAVE YOU HAD SHORTNESS OF BREATH: ONCE OR TWICE A WEEK
QUESTION_5 LAST FOUR WEEKS HOW WOULD YOU RATE YOUR ASTHMA CONTROL: WELL CONTROLLED
QUESTION_4 LAST FOUR WEEKS HOW OFTEN HAVE YOU USED YOUR RESCUE INHALER OR NEBULIZER MEDICATION (SUCH AS ALBUTEROL): ONCE A WEEK OR LESS
ACT_TOTALSCORE: 20

## 2021-06-29 NOTE — PATIENT INSTRUCTIONS
Wear the walking boot for the next 2-6 weeks. You can stop wearing the boot when you can walk without pain.    Ice the ankle for 20 minutes 2-4 times a day.    Elevate the ankle when you are able.     Ibuprofen 600 mg twice daily with food for 10 days.    X-ray today.    Hamida Patten, CNP

## 2021-06-30 ASSESSMENT — ANXIETY QUESTIONNAIRES: GAD7 TOTAL SCORE: 5

## 2021-06-30 ASSESSMENT — ASTHMA QUESTIONNAIRES: ACT_TOTALSCORE: 20

## 2021-07-09 ENCOUNTER — HOSPITAL ENCOUNTER (OUTPATIENT)
Dept: CT IMAGING | Facility: CLINIC | Age: 21
Discharge: HOME | End: 2021-07-09
Attending: UROLOGY | Admitting: UROLOGY
Payer: COMMERCIAL

## 2021-07-09 DIAGNOSIS — R10.9 FLANK PAIN: ICD-10-CM

## 2021-07-09 PROCEDURE — 74176 CT ABD & PELVIS W/O CONTRAST: CPT

## 2021-07-09 NOTE — RESULT ENCOUNTER NOTE
Ms. Lovett,   Enclosed are a copy of your recent laboratory tests.  I have reviewed these results.  The results are as I would expect and I don't think any further laboratory tests are necessary at this time.  There may be other results pending.  If there are, I will send there on to you when they are complete.  You should plan to follow-up as we discussed at your recent visit.  Please let me know if you have any questions.  Thank you for choosing the [facility] for your care.  ANABEL Pickens MD.

## 2021-08-25 DIAGNOSIS — F90.9 ATTENTION DEFICIT HYPERACTIVITY DISORDER (ADHD), UNSPECIFIED ADHD TYPE: Primary | ICD-10-CM

## 2021-08-25 RX ORDER — METHYLPHENIDATE HYDROCHLORIDE 36 MG/1
TABLET, EXTENDED RELEASE ORAL
Qty: 30 TABLET | Refills: 0 | Status: SHIPPED | OUTPATIENT
Start: 2021-08-25 | End: 2021-10-15

## 2021-08-25 NOTE — TELEPHONE ENCOUNTER
Pending Prescriptions:                       Disp   Refills    Methylphenidate HCl ER 36 MG 24H tablet [P*30 tab*0        Sig: TAKE ONE TABLET BY MOUTH EVERY MORNING      Routing refill request to provider for review/approval because:  Drug not on the [facility] refill protocol     Rupali Wiggins RN on 8/25/2021 at 5:37 PM

## 2021-08-26 NOTE — TELEPHONE ENCOUNTER
Needs to be seen every 3 months for med check, given one diane this time and schedule virtual for follow-up.     Mallorie Rowe PA-C

## 2021-09-30 ENCOUNTER — APPOINTMENT (OUTPATIENT)
Dept: URGENT CARE | Facility: CLINIC | Age: 21
End: 2021-09-30
Payer: COMMERCIAL

## 2021-10-14 NOTE — PROGRESS NOTES
"Assessment & Plan   Problem List Items Addressed This Visit     Attention deficit hyperactivity disorder (ADHD), unspecified ADHD type     Has history of ADHD for years and has been on the same dose of Concerta which she tried coming off last year but went back on it due to worsening symptoms.  She reports well-controlled symptoms on the current dose.  Denies any side effects.  Update urine drug screen today.  Will follow up with PCP in 3 months to resign the controlled substance agreement.  Refill sent.         Relevant Medications    Methylphenidate HCl ER 36 MG 24H tablet    Other Relevant Orders    Drug Abuse Screen Panel 13, Urine (Pain Care Package) - lab collect        Abbi De La Paz MD  [facility]    Subjective   Heather is a 21 year old who presents for the following health issues     HPI     SUBJECTIVE:  Heather is here today to recheck ADHD/ADD.    Updates since last visit: 5/19/21    Routine for taking medicine, including time: 7:30am  Time medicine wears off: 8:00pm  Issues at school/work: No  Issues at home: No  Control of symptoms: Yes    Side effects:  Headaches: No  Stomach aches: Yes   Irritability/mood swings: No  Difficulties with sleep: No  Social withdrawal: No  Unusual movements/tics: No  Decreased appetite: No    Other concerns: No      Review of Systems   Constitutional, HEENT, cardiovascular, pulmonary, gi and gu systems are negative, except as otherwise noted.      Objective    /62   Pulse 88   Temp 97.8  F (36.6  C) (Temporal)   Resp 18   Ht 1.6 m (5' 2.99\")   Wt 103.9 kg (229 lb)   LMP 10/03/2021 (Exact Date)   SpO2 97%   BMI 40.58 kg/m    Body mass index is 40.58 kg/m .  Physical Exam   GENERAL: healthy, alert and no distress  NECK: no adenopathy, no asymmetry, masses, or scars and thyroid normal to palpation  RESP: lungs clear to auscultation - no rales, rhonchi or wheezes  CV: regular rate and rhythm, normal S1 S2, no S3 or S4, no murmur, click or " rub, no peripheral edema and peripheral pulses strong  ABDOMEN: soft, nontender, no hepatosplenomegaly, no masses and bowel sounds normal

## 2021-10-15 ENCOUNTER — OFFICE VISIT (OUTPATIENT)
Dept: FAMILY MEDICINE | Facility: OTHER | Age: 21
End: 2021-10-15
Payer: COMMERCIAL

## 2021-10-15 VITALS
TEMPERATURE: 97.8 F | SYSTOLIC BLOOD PRESSURE: 108 MMHG | HEIGHT: 63 IN | OXYGEN SATURATION: 97 % | WEIGHT: 229 LBS | HEART RATE: 88 BPM | RESPIRATION RATE: 18 BRPM | DIASTOLIC BLOOD PRESSURE: 62 MMHG | BODY MASS INDEX: 40.57 KG/M2

## 2021-10-15 DIAGNOSIS — F90.9 ATTENTION DEFICIT HYPERACTIVITY DISORDER (ADHD), UNSPECIFIED ADHD TYPE: Primary | ICD-10-CM

## 2021-10-15 PROCEDURE — 99213 OFFICE O/P EST LOW 20 MIN: CPT | Performed by: FAMILY MEDICINE

## 2021-10-15 RX ORDER — METHYLPHENIDATE HYDROCHLORIDE 36 MG/1
36 TABLET, EXTENDED RELEASE ORAL EVERY MORNING
Qty: 30 TABLET | Refills: 0 | Status: SHIPPED | OUTPATIENT
Start: 2021-10-15 | End: 2022-07-20

## 2021-10-15 ASSESSMENT — ANXIETY QUESTIONNAIRES
6. BECOMING EASILY ANNOYED OR IRRITABLE: SEVERAL DAYS
GAD7 TOTAL SCORE: 2
2. NOT BEING ABLE TO STOP OR CONTROL WORRYING: NOT AT ALL
IF YOU CHECKED OFF ANY PROBLEMS ON THIS QUESTIONNAIRE, HOW DIFFICULT HAVE THESE PROBLEMS MADE IT FOR YOU TO DO YOUR WORK, TAKE CARE OF THINGS AT HOME, OR GET ALONG WITH OTHER PEOPLE: NOT DIFFICULT AT ALL
7. FEELING AFRAID AS IF SOMETHING AWFUL MIGHT HAPPEN: NOT AT ALL
1. FEELING NERVOUS, ANXIOUS, OR ON EDGE: NOT AT ALL
3. WORRYING TOO MUCH ABOUT DIFFERENT THINGS: SEVERAL DAYS
5. BEING SO RESTLESS THAT IT IS HARD TO SIT STILL: NOT AT ALL

## 2021-10-15 ASSESSMENT — PATIENT HEALTH QUESTIONNAIRE - PHQ9
SUM OF ALL RESPONSES TO PHQ QUESTIONS 1-9: 3
5. POOR APPETITE OR OVEREATING: NOT AT ALL

## 2021-10-15 ASSESSMENT — MIFFLIN-ST. JEOR: SCORE: 1772.74

## 2021-10-15 ASSESSMENT — PAIN SCALES - GENERAL: PAINLEVEL: NO PAIN (0)

## 2021-10-15 NOTE — ASSESSMENT & PLAN NOTE
Has history of ADHD for years and has been on the same dose of Concerta which she tried coming off last year but went back on it due to worsening symptoms.  She reports well-controlled symptoms on the current dose.  Denies any side effects.  Update urine drug screen today.  Will follow up with PCP in 3 months to resign the controlled substance agreement.  Refill sent.

## 2021-10-16 ASSESSMENT — ANXIETY QUESTIONNAIRES: GAD7 TOTAL SCORE: 2

## 2021-10-18 ENCOUNTER — LAB (OUTPATIENT)
Dept: LAB | Facility: OTHER | Age: 21
End: 2021-10-18
Payer: COMMERCIAL

## 2021-10-18 DIAGNOSIS — F90.9 ATTENTION DEFICIT HYPERACTIVITY DISORDER (ADHD), UNSPECIFIED ADHD TYPE: ICD-10-CM

## 2021-10-18 LAB
AMPHETAMINES UR QL: NOT DETECTED
BARBITURATES UR QL SCN: NOT DETECTED
BENZODIAZ UR QL SCN: NOT DETECTED
BUPRENORPHINE UR QL: NOT DETECTED
CANNABINOIDS UR QL: NOT DETECTED
COCAINE UR QL SCN: NOT DETECTED
D-METHAMPHET UR QL: NOT DETECTED
METHADONE UR QL SCN: NOT DETECTED
OPIATES UR QL SCN: NOT DETECTED
OXYCODONE UR QL SCN: NOT DETECTED
PCP UR QL SCN: NOT DETECTED
PROPOXYPH UR QL: NOT DETECTED
TRICYCLICS UR QL SCN: NOT DETECTED

## 2021-10-18 PROCEDURE — 80306 DRUG TEST PRSMV INSTRMNT: CPT

## 2021-10-19 NOTE — RESULT ENCOUNTER NOTE
MsOctavia Alphonsehannah    Your recent test results are attached. Urine drug screen negative for any illicit drug use    If you have any questions or concerns please contact me via My Chart or call the clinic at [phone]     Thank You  Abbi De La Paz MD.

## 2021-10-23 ENCOUNTER — HEALTH MAINTENANCE LETTER (OUTPATIENT)
Age: 21
End: 2021-10-23

## 2021-12-15 ENCOUNTER — OFFICE VISIT (OUTPATIENT)
Dept: FAMILY MEDICINE | Facility: OTHER | Age: 21
End: 2021-12-15
Payer: COMMERCIAL

## 2021-12-15 VITALS
HEIGHT: 63 IN | OXYGEN SATURATION: 100 % | SYSTOLIC BLOOD PRESSURE: 124 MMHG | DIASTOLIC BLOOD PRESSURE: 74 MMHG | BODY MASS INDEX: 40.96 KG/M2 | HEART RATE: 108 BPM | TEMPERATURE: 97.5 F | RESPIRATION RATE: 20 BRPM | WEIGHT: 231.2 LBS

## 2021-12-15 DIAGNOSIS — K21.9 GASTROESOPHAGEAL REFLUX DISEASE WITHOUT ESOPHAGITIS: Primary | ICD-10-CM

## 2021-12-15 PROCEDURE — 99214 OFFICE O/P EST MOD 30 MIN: CPT | Performed by: STUDENT IN AN ORGANIZED HEALTH CARE EDUCATION/TRAINING PROGRAM

## 2021-12-15 RX ORDER — OMEPRAZOLE 20 MG/1
20 TABLET, DELAYED RELEASE ORAL DAILY
Qty: 90 TABLET | Refills: 0 | Status: SHIPPED | OUTPATIENT
Start: 2021-12-15 | End: 2022-07-20

## 2021-12-15 ASSESSMENT — MIFFLIN-ST. JEOR: SCORE: 1785.85

## 2021-12-15 ASSESSMENT — PAIN SCALES - GENERAL: PAINLEVEL: NO PAIN (0)

## 2021-12-15 NOTE — PROGRESS NOTES
Assessment & Plan     Gastroesophageal reflux disease without esophagitis  I do think that the patient's symptoms are in relation to her acid reflux, typically at night when she lies down some nausea, burning in the back of the throat with some vomiting. She has had a thorough work-up earlier this year including ultrasound, CT imaging, and laboratory work with no obvious culprit for her symptoms. Based off her history and what she is telling me today, we will go the route of GERD treatment with lifestyle/conservative measures plus medication. I gave her the option of omeprazole versus famotidine along with pros and cons of each, she prefers omeprazole route. I also gave her handout about foods that she should avoid since they can trigger acid reflux more frequently and general information on GERD. She will trial all these measures to see if this improves things. 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 I will see her back in about a month to see how things are going. We will consider medication adjustment if needed. I advised her to cut back on her ibuprofen use as well. I also encouraged her to make a food diary to identify any foods that are triggering her symptoms.  - omeprazole (PRILOSEC OTC) 20 MG EC tablet; Take 1 tablet (20 mg) by mouth daily    I have spent 30 or more minutes face-to-face with the patient and coordinating care such as reviewing documentation, results, or having discussions over the phone.      Return in about 4 weeks (around 1/12/2022) for Follow up - GERD.    NETTA CONNOR MD  [facility] REGLA Romero is a 21 year old who presents for the following health issues     History of Present Illness       She eats 0-1 servings of fruits and vegetables daily.She consumes 2 sweetened beverage(s) daily.She exercises with enough effort to  "increase her heart rate 9 or less minutes per day.  She exercises with enough effort to increase her heart rate 3 or less days per week. She is missing 5 dose(s) of medications per week.       Concern - Right sided abdominal Pain  Onset: 1 year  Description: pain on right side of abdomen on/off gets worse with laying down  Intensity: mild, severe  Progression of Symptoms:  worsening and intermittent  Accompanying Signs & Symptoms:nausea and vomiting  Previous history of similar problem: no  Precipitating factors:        Worsened by: laying down  Alleviating factors:        Improved by: nothing  Therapies tried and outcome: Ibuprofen      Review of Systems   Constitutional, HEENT, cardiovascular, pulmonary, gi and gu systems are negative, except as otherwise noted.      Objective    /74   Pulse 108   Temp 97.5  F (36.4  C) (Temporal)   Resp 20   Ht 1.605 m (5' 3.19\")   Wt 104.9 kg (231 lb 3.2 oz)   LMP 11/30/2021 (Exact Date)   SpO2 100%   Breastfeeding No   BMI 40.71 kg/m    Body mass index is 40.71 kg/m .  Physical Exam  Vitals and nursing note reviewed.   Constitutional:       General: She is not in acute distress.     Appearance: Normal appearance. She is not ill-appearing, toxic-appearing or diaphoretic.   HENT:      Head: Normocephalic.      Right Ear: External ear normal.      Left Ear: External ear normal.      Nose: No rhinorrhea.   Eyes:      General:         Right eye: No discharge.         Left eye: No discharge.      Extraocular Movements: Extraocular movements intact.      Conjunctiva/sclera: Conjunctivae normal.      Pupils: Pupils are equal, round, and reactive to light.   Pulmonary:      Effort: Pulmonary effort is normal. No respiratory distress.   Musculoskeletal:         General: Normal range of motion.      Cervical back: Normal range of motion.   Neurological:      Mental Status: She is alert and oriented to person, place, and time.   Psychiatric:         Mood and Affect: Mood " normal.         Behavior: Behavior normal.

## 2021-12-15 NOTE — PATIENT INSTRUCTIONS
Gastroesophageal Reflux Disease (GERD)    GERD - A Common Problem  Gastroesophageal reflux disease (GERD) is a common problem that affects many people. One in five people say they have GERD symptoms at least once a week. GERD often is referred to as acid reflux.  As you learn about GERD and its possible treatments by reading this information, think about how you can incorporate the suggested strategies and lifestyle changes.  If you have any questions about this information, talk with your health care provider.  What is GERD?  During normal digestion, food or liquid travels from your mouth into your stomach through a tube called the esophagus. Acids and enzymes help digest food and liquids in your stomach.  At the lower end of the esophagus is a circular band of muscle called the lower esophageal sphincter (LES). The LES acts as a one-way valve. Food or liquid passes through it but stomach contents cannot come back up into the esophagus. See Figure 1.     If the LES muscle is weak or does not work correctly, for example it relaxes at the wrong time, the contents of the stomach can come back up into the esophagus (reflux).  The lining of the esophagus is sensitive to acid and is not protected like the lining of the stomach. This causes troublesome symptoms. For most people, the problem is not that they have too much stomach acid. Rather, the acid goes where it should not be.  When reflux happens often, typically two or more times a week, and interferes with daily life, it usually is considered gastroesophageal reflux disease (GERD).  Less common reasons for GERD are the lack of saliva or the loss of the esophageal squeeze that can delay the clearing of acid from the esophagus.  Symptoms of GERD  Heartburn and regurgitation are the two main symptoms of GERD. Heartburn is a burning feeling that may rise up behind the breast bone. It may be made worse by lying down, bending over or eating certain foods. Regurgitation is  a feeling of food or fluid coming back up toward the throat. This may cause a bitter taste in the mouth.  Other less common GERD symptoms include sore throat, chronic cough, hoarseness, chest pain, or wheezing.  Hiatal hernia  Some people with a hiatal hernia also have symptoms of GERD. With a hiatal hernia, part of the stomach slides up into the chest. This keeps the diaphragm from working normally. This may be associated with a weaker LES.  When GERD symptoms are associated with a hiatal hernia, they are often controlled with medication. Only a small percentage of people who have a hiatal hernia need to have surgery.    Although hiatal hernias are common, many people who have one do not have symptoms of GERD.  Diagnosing GERD  To diagnose GERD, your health care provider asks about your symptoms. If you have typical symptoms that respond to treatment, no other tests may be needed. Testing may be needed if your symptoms are not typical or they are concerning such as unexplained weight loss or trouble swallowing.  If testing is needed to diagnose GERD, help determine how severe GERD is, or help with a treatment plan, you may have one or more of the following tests.  Upper endoscopy (EGD)  This test gives your health care team a direct view of the inside of your esophagus, stomach and upper part of the small intestine. It is used to look for any damage reflux has done to your esophagus.  During an EGD, a thin, flexible tube with a light and camera (endoscope) is put through your mouth and moved down through the esophagus, stomach and intestine. A sample of tissue may be taken during an endoscopy to test for inflammation. This is called a biopsy.  24-hour pH/impedance reflux test  This test measures acid and non-acid reflux of stomach contents that goes into the esophagus.  A thin, flexible tube with acid and non-acid sensors is put through your nose and moved down your throat into the lower esophagus. It stays there  for 24 hours. The sensors measure how often and for how long stomach acid and non-acid goes up into your esophagus.  A second type of pH reflux test measures only acid reflux. For this test, upper endoscopy is used to place a wireless device against the wall of the esophagus. The probe in the esophagus measures refluxed stomach acid. After 48 hours of recording, the probe falls off and passes through the stomach and intestines.  Transnasal esophagoscopy (TNE)  This test is done to look for any damage in your esophagus.  A thin, flexible tube with a video camera is put through your nose and moved down your throat into the esophagus. The camera sends pictures to a video screen.  Complications of GERD  Ongoing reflux or reflux not treated effectively can cause the esophagus to become inflamed. Inflammation can damage the esophagus over time and lead to the following complications.  1. A sore (ulcer) forms in the esophagus. The sore can bleed causing pain, difficulty swallowing, and anemia.  2. The esophagus becomes narrowed from scarring into what is called a stricture. This can cause food to stick when you are trying to swallow it.  3. Rhoades s esophagus can happen. This condition has no symptoms. It needs to be taken seriously because sometimes it can lead to cancer.    Talk with your health care provider about your risk for these complications and what can be done to help.  Treating GERD  The first goal of treating GERD is to have no heartburn or regurgitation. It is not fine to have GERD symptoms a time or two each week. If you continue to have heartburn or regurgitation, you may need additional treatment.  The second goal is to prevent the complications listed previously.  Treatment options for GERD include:    Lifestyle changes.    Medications.    Surgery.    Tell your health care provider if you still have GERD symptoms after making lifestyle changes and trying medication.  Lifestyle changes  Review the  following lifestyle changes that are known to help improve GERD symptoms. Select those you want to do to improve the quality of your life.  1. Eat smaller meals more often. Do not eat large meals or overeat, especially before exercising.  2. Stop eating two to four hours before going to bed.  3. Eat slowly and chew food well.  4. Stand or sit upright for at least 30 minutes after eating.  5. Raise the head of your bed 4 to 6 inches. To do this, put blocks or books under the legs at the head of your bed. See Figure 2. Or place a wedge under the mattress. Do not sleep on several pillows as this can increase pressure on your stomach and make GERD worse.     1. When you go to bed, start by lying on your left side to help make it less likely that you will have reflux.  2. If you smoke, stop. Do not be around tobacco smoke.  3. Lose weight if you are overweight. Excess weight around your waist causes more pressure on the stomach. Maintain a healthy weight.  4. Limit how much alcohol you drink.  5. Do not wear tight-fitting clothes.  6. Limit or stop eating foods that bring on your GERD symptoms or make your symptoms worse. Some foods that can cause or worsen GERD symptoms are listed below. You do not need to limit or stop all of the foods listed, only those that bring on or worsen your GERD symptoms.  ? Fatty foods, including cream sauces, butter, margarine, shortening  ? Fatty meat including high-fat hamburgers, sanon, sausage and ribs  ? Chocolate, especially high-fat milk chocolate  ? Spearmint, peppermint  ? Tomatoes and tomato-based products  ? Citrus fruit and juice  ? Caffeine  ? Carbonated drinks, especially soda pop with caffeine  ? Fried foods such as French fries and onion rings  ? High-fat dairy products including whole milk  ? Peanut butter and high-fat nuts  ? Hot sauces and peppers  ? Garlic  ? Onion  ? Apples  ? Cucumbers and pickles  ? Green peppers  ? Spicy food  Medications  Your health care provider  may recommend that you take medication to help relieve GERD symptoms and to prevent complications of GERD.  It is important to follow your health care provider s instructions and the directions on the medication package so that the medication works effectively.   All medications may have side effects and risks. Talk with your health care provider about the side effects and risks associated with the medications. One common side effect can be decreased calcium absorption. Talk with your health care provider about having your calcium levels checked.  There are two main types of medication for the treatment of GERD: quick relief and long-term prevention.  Sometimes over-the-counter (OTC) medication is recommended. These include:  1. Antacids to neutralize stomach acid. Antacids usually work within minutes to give quick relief for symptoms. However, antacids cannot heal an inflamed esophagus damaged by stomach acid.  2. Alginic acid to protect the esophagus from acid.  3. Medication to lessen how much acid the stomach makes.  1. Histamine 2 blockers or H-2-receptor blockers do not act as quickly as antacids but they provide longer relief.  2. Proton pump inhibitors (PPI) are the most potent blockers of acid and may work for up to 24 hours. A PPI does not stop reflux but it can help relieve symptoms.  If your symptoms are not relieved within a few weeks of taking OTC medications, your health care provider may recommend prescription medication such as:    Prescription-strength proton pump inhibitor.    Prescription-strength H-2-receptor blocker, which may be taken for both quick relief and long-acting prevention of symptoms.    Your health care provider usually recommends that you take the lowest dose possible to control GERD symptoms. Sometimes medications to treat GERD are combined to increase effectiveness.  It may be possible to no longer need medication to relieve GERD symptoms if the lifestyle changes you make stop  your GERD symptoms.  Paying for medications to treat GERD  Likely, you will need to take medication to treat GERD for a long time. Therefore, it is important to plan for how you will pay for medications.  Start by talking with your health insurance provider. Ask about the cost your insurance covers and the cost you would pay out-of-pocket for the medications your health care provider is recommending that you take. You also may need to go to several stores to check prices for over-the-counter medications.  If you have questions about medications, talk with your health care provider.  Surgery  Surgery may be recommended if GERD symptoms continue even after you make lifestyle changes and take recommended medications. Surgery also may be recommended if you cannot tolerate the medications.  Surgery may be necessary if you have:  1. An esophageal sphincter that needs to be bolstered by the top part of the stomach or a metal ring.  2. A large hiatal hernia.  3. Inflammation of the esophagus, especially with bleeding.  4. Recurrent narrowing of the esophagus.  5. Rhoades s esophagus, especially with progressive precancerous changes.  6. Severe or ongoing lung problems such as bronchitis or pneumonia due to acid reflux.  The surgical treatment for GERD is fundoplication. This treatment gives additional support to muscle in the lower esophagus to prevent backwash of stomach acid into the esophagus. There are several approaches to this surgery including:    Nissen fundoplication. It is done either laparoscopically (several smaller incisions), or open (one larger incision).    Belsey Stanley IV operation.    Toupet fundoplication.    Talk about these surgical approaches with your surgeon. Together you can decide which approach is best for you. Ask for more information about fundoplication surgery, including any associated side effects, risks and complications. For example, after having surgery, your body may not be able to burp or  belch or vomit.  General anesthesia is used, so you are asleep during the surgical procedure. The surgery usually takes about two to three hours.  Long-term results after surgery are very good. But some people eventually may need to take medication.  LINX  procedure    A relatively new way to treat GERD is the LINX  reflux management system. With this procedure, a strand of magnetic beads made of titanium is implanted around the outside of the lower esophageal sphincter. It reinforces the weak LES.  As you swallow food or liquid, the beads open to let it pass into your stomach and then the beads close. The magnetic attraction between the beads helps the LES resist opening to gastric pressures. This helps prevent stomach acid from entering your esophagus. The beads do not affect burping, belching or vomiting.  Talk about this procedure with your surgeon. Together you can decide whether it is an option for you. Ask for more information about it, including any associated side effects, risks and complications. The LINX  procedure requires no alteration to the stomach. While its placement is less invasive than fundoplication surgery, it may not work as well as surgery for some people.    The magnetic beads do not have an effect on airport security or magnetic resonance imaging. Ask your health insurance provider if your insurance covers this procedure.  This material is for your education and information only. This content does not replace medical advice, diagnosis or treatment. New medical research may change this information. If you have questions about a medical condition, always talk with your health care provider.  1. 2017 [facility] [employer] ([employer]). All rights reserved.  [id number]

## 2021-12-16 ASSESSMENT — ASTHMA QUESTIONNAIRES: ACT_TOTALSCORE: 13

## 2021-12-18 ENCOUNTER — HEALTH MAINTENANCE LETTER (OUTPATIENT)
Age: 21
End: 2021-12-18

## 2022-04-26 ENCOUNTER — E-VISIT (OUTPATIENT)
Dept: FAMILY MEDICINE | Facility: OTHER | Age: 22
End: 2022-04-26
Payer: MEDICAID

## 2022-04-26 DIAGNOSIS — Z53.9 ERRONEOUS ENCOUNTER--DISREGARD: Primary | ICD-10-CM

## 2022-07-20 ENCOUNTER — TELEPHONE (OUTPATIENT)
Dept: FAMILY MEDICINE | Facility: OTHER | Age: 22
End: 2022-07-20

## 2022-07-20 ENCOUNTER — OFFICE VISIT (OUTPATIENT)
Dept: FAMILY MEDICINE | Facility: OTHER | Age: 22
End: 2022-07-20
Payer: COMMERCIAL

## 2022-07-20 VITALS
OXYGEN SATURATION: 97 % | HEART RATE: 91 BPM | HEIGHT: 63 IN | TEMPERATURE: 98.5 F | WEIGHT: 214 LBS | RESPIRATION RATE: 18 BRPM | SYSTOLIC BLOOD PRESSURE: 122 MMHG | BODY MASS INDEX: 37.92 KG/M2 | DIASTOLIC BLOOD PRESSURE: 78 MMHG

## 2022-07-20 DIAGNOSIS — N92.0 MENORRHAGIA WITH REGULAR CYCLE: ICD-10-CM

## 2022-07-20 DIAGNOSIS — Z11.59 NEED FOR HEPATITIS C SCREENING TEST: ICD-10-CM

## 2022-07-20 DIAGNOSIS — D69.3 IDIOPATHIC THROMBOCYTOPENIA (H): ICD-10-CM

## 2022-07-20 DIAGNOSIS — Z12.4 CERVICAL CANCER SCREENING: ICD-10-CM

## 2022-07-20 DIAGNOSIS — Z00.00 ROUTINE GENERAL MEDICAL EXAMINATION AT A HEALTH CARE FACILITY: Primary | ICD-10-CM

## 2022-07-20 DIAGNOSIS — R63.4 WEIGHT LOSS: ICD-10-CM

## 2022-07-20 DIAGNOSIS — Z11.3 SCREENING FOR STDS (SEXUALLY TRANSMITTED DISEASES): ICD-10-CM

## 2022-07-20 DIAGNOSIS — F90.9 ATTENTION DEFICIT HYPERACTIVITY DISORDER (ADHD), UNSPECIFIED ADHD TYPE: ICD-10-CM

## 2022-07-20 DIAGNOSIS — J45.31 MILD PERSISTENT ASTHMA WITH EXACERBATION: ICD-10-CM

## 2022-07-20 DIAGNOSIS — Z11.4 SCREENING FOR HIV (HUMAN IMMUNODEFICIENCY VIRUS): ICD-10-CM

## 2022-07-20 DIAGNOSIS — Z86.2 H/O: IRON DEFICIENCY ANEMIA: ICD-10-CM

## 2022-07-20 DIAGNOSIS — E66.01 MORBID OBESITY (H): ICD-10-CM

## 2022-07-20 LAB
AMPHETAMINES UR QL: NOT DETECTED
BARBITURATES UR QL SCN: NOT DETECTED
BENZODIAZ UR QL SCN: NOT DETECTED
BUPRENORPHINE UR QL: NOT DETECTED
CANNABINOIDS UR QL: NOT DETECTED
COCAINE UR QL SCN: NOT DETECTED
D-METHAMPHET UR QL: NOT DETECTED
ERYTHROCYTE [DISTWIDTH] IN BLOOD BY AUTOMATED COUNT: 13.8 % (ref 10–15)
HCT VFR BLD AUTO: 39.8 % (ref 35–47)
HGB BLD-MCNC: 13 G/DL (ref 11.7–15.7)
MCH RBC QN AUTO: 28.7 PG (ref 26.5–33)
MCHC RBC AUTO-ENTMCNC: 32.7 G/DL (ref 31.5–36.5)
MCV RBC AUTO: 88 FL (ref 78–100)
METHADONE UR QL SCN: NOT DETECTED
OPIATES UR QL SCN: NOT DETECTED
OXYCODONE UR QL SCN: NOT DETECTED
PCP UR QL SCN: NOT DETECTED
PLATELET # BLD AUTO: 152 10E3/UL (ref 150–450)
PROPOXYPH UR QL: NOT DETECTED
RBC # BLD AUTO: 4.53 10E6/UL (ref 3.8–5.2)
TRICYCLICS UR QL SCN: NOT DETECTED
TSH SERPL DL<=0.005 MIU/L-ACNC: 3.23 MU/L (ref 0.4–4)
WBC # BLD AUTO: 7.8 10E3/UL (ref 4–11)

## 2022-07-20 PROCEDURE — 99214 OFFICE O/P EST MOD 30 MIN: CPT | Mod: 25 | Performed by: FAMILY MEDICINE

## 2022-07-20 PROCEDURE — 80306 DRUG TEST PRSMV INSTRMNT: CPT | Performed by: FAMILY MEDICINE

## 2022-07-20 PROCEDURE — 86780 TREPONEMA PALLIDUM: CPT | Performed by: FAMILY MEDICINE

## 2022-07-20 PROCEDURE — 86803 HEPATITIS C AB TEST: CPT | Performed by: FAMILY MEDICINE

## 2022-07-20 PROCEDURE — 84443 ASSAY THYROID STIM HORMONE: CPT | Performed by: FAMILY MEDICINE

## 2022-07-20 PROCEDURE — 87491 CHLMYD TRACH DNA AMP PROBE: CPT | Performed by: FAMILY MEDICINE

## 2022-07-20 PROCEDURE — 85027 COMPLETE CBC AUTOMATED: CPT | Performed by: FAMILY MEDICINE

## 2022-07-20 PROCEDURE — 87591 N.GONORRHOEAE DNA AMP PROB: CPT | Performed by: FAMILY MEDICINE

## 2022-07-20 PROCEDURE — 36415 COLL VENOUS BLD VENIPUNCTURE: CPT | Performed by: FAMILY MEDICINE

## 2022-07-20 PROCEDURE — G0145 SCR C/V CYTO,THINLAYER,RESCR: HCPCS | Performed by: FAMILY MEDICINE

## 2022-07-20 PROCEDURE — 87389 HIV-1 AG W/HIV-1&-2 AB AG IA: CPT | Performed by: FAMILY MEDICINE

## 2022-07-20 PROCEDURE — 99395 PREV VISIT EST AGE 18-39: CPT | Performed by: FAMILY MEDICINE

## 2022-07-20 RX ORDER — ALBUTEROL SULFATE 0.83 MG/ML
2.5 SOLUTION RESPIRATORY (INHALATION) EVERY 4 HOURS PRN
Qty: 3 ML | Refills: 11 | Status: SHIPPED | OUTPATIENT
Start: 2022-07-20 | End: 2022-07-25

## 2022-07-20 RX ORDER — METHYLPHENIDATE HYDROCHLORIDE 36 MG/1
36 TABLET, EXTENDED RELEASE ORAL EVERY MORNING
Qty: 30 TABLET | Refills: 0 | Status: SHIPPED | OUTPATIENT
Start: 2022-07-20 | End: 2023-06-08

## 2022-07-20 RX ORDER — NORGESTIMATE AND ETHINYL ESTRADIOL 0.25-0.035
1 KIT ORAL DAILY
Qty: 90 TABLET | Refills: 3 | Status: SHIPPED | OUTPATIENT
Start: 2022-07-20 | End: 2023-03-08

## 2022-07-20 RX ORDER — ALBUTEROL SULFATE 90 UG/1
2 AEROSOL, METERED RESPIRATORY (INHALATION) EVERY 4 HOURS PRN
Qty: 18 G | Refills: 0 | Status: SHIPPED | OUTPATIENT
Start: 2022-07-20 | End: 2023-03-08

## 2022-07-20 RX ORDER — NORGESTIMATE AND ETHINYL ESTRADIOL 0.25-0.035
1 KIT ORAL DAILY
Qty: 90 TABLET | Refills: 3 | Status: CANCELLED | OUTPATIENT
Start: 2022-07-20

## 2022-07-20 RX ORDER — FLUTICASONE PROPIONATE AND SALMETEROL 500; 50 UG/1; UG/1
1 POWDER RESPIRATORY (INHALATION) EVERY 12 HOURS
Qty: 60 EACH | Refills: 5 | Status: SHIPPED | OUTPATIENT
Start: 2022-07-20 | End: 2023-03-08

## 2022-07-20 ASSESSMENT — ENCOUNTER SYMPTOMS
CONSTIPATION: 0
NAUSEA: 1
HEADACHES: 1
COUGH: 0
HEARTBURN: 0
PARESTHESIAS: 0
HEMATURIA: 0
DYSURIA: 0
SHORTNESS OF BREATH: 0
ABDOMINAL PAIN: 0
PALPITATIONS: 0
ARTHRALGIAS: 0
EYE PAIN: 0
DIARRHEA: 0
SORE THROAT: 0
CHILLS: 0
HEMATOCHEZIA: 0
FREQUENCY: 1
FEVER: 0
BREAST MASS: 0
MYALGIAS: 0
WEAKNESS: 0
NERVOUS/ANXIOUS: 1
JOINT SWELLING: 0
DIZZINESS: 0

## 2022-07-20 ASSESSMENT — PATIENT HEALTH QUESTIONNAIRE - PHQ9
10. IF YOU CHECKED OFF ANY PROBLEMS, HOW DIFFICULT HAVE THESE PROBLEMS MADE IT FOR YOU TO DO YOUR WORK, TAKE CARE OF THINGS AT HOME, OR GET ALONG WITH OTHER PEOPLE: SOMEWHAT DIFFICULT
SUM OF ALL RESPONSES TO PHQ QUESTIONS 1-9: 6
SUM OF ALL RESPONSES TO PHQ QUESTIONS 1-9: 6

## 2022-07-20 ASSESSMENT — PAIN SCALES - GENERAL: PAINLEVEL: NO PAIN (0)

## 2022-07-20 ASSESSMENT — ASTHMA QUESTIONNAIRES: ACT_TOTALSCORE: 17

## 2022-07-20 NOTE — PROGRESS NOTES
SUBJECTIVE:   CC: Heather Lovett is an 22 year old woman who presents for preventive health visit.       Patient has been advised of split billing requirements and indicates understanding: Yes  Healthy Habits:     Getting at least 3 servings of Calcium per day:  NO    Bi-annual eye exam:  Yes    Dental care twice a year:  Yes    Sleep apnea or symptoms of sleep apnea:  Excessive snoring    Diet:  Other    Frequency of exercise:  None    Taking medications regularly:  Yes    Medication side effects:  None    PHQ-2 Total Score: 2    Additional concerns today:  No    Patient declined to discuss her ADHD today and feels that she is needing this again.  We agreed to restart her medications and recheck in 1 month to see how they are going to see if any adjustments are needed since its been a while since she is taking any.  She is sexually active and wanting STD testing though is interested in keeping this from her mother and has asked her to be removed from the chart at this time.  She does state that her mother knows that she is sexually active but feels this is private and does not need to be directly shared with her that that should be Heather discussing with her mother which is appropriate even with the legal guardianship as sexual activity does not need to be disclosed in these cases.    Patient has not been taking her asthma controller and is poorly controlled at this time.      Today's PHQ-2 Score:   PHQ-2 ( 1999 Pfizer) 7/20/2022   Q1: Little interest or pleasure in doing things 1   Q2: Feeling down, depressed or hopeless 1   PHQ-2 Score 2   PHQ-2 Total Score (12-17 Years)- Positive if 3 or more points; Administer PHQ-A if positive -   Q1: Little interest or pleasure in doing things More than half the days   Q2: Feeling down, depressed or hopeless Several days   PHQ-2 Score 3       Abuse: Current or Past (Physical, Sexual or Emotional) - No  Do you feel safe in your environment? Yes    Have you ever done  Advance Care Planning? (For example, a Health Directive, POLST, or a discussion with a medical provider or your loved ones about your wishes): No, advance care planning information given to patient to review.  Advanced care planning was discussed at today's visit.    Social History     Tobacco Use     Smoking status: Never Smoker     Smokeless tobacco: Never Used   Substance Use Topics     Alcohol use: Yes     Comment: occasionally         Alcohol Use 7/20/2022   Prescreen: >3 drinks/day or >7 drinks/week? No       Reviewed orders with patient.  Reviewed health maintenance and updated orders accordingly - Yes  Lab work is in process    Breast Cancer Screening:        History of abnormal Pap smear: NO - age 21-29 PAP every 3 years recommended     Reviewed and updated as needed this visit by clinical staff   Tobacco  Allergies  Meds  Problems  Med Hx  Surg Hx  Fam Hx  Soc   Hx          Reviewed and updated as needed this visit by Provider   Tobacco  Allergies  Meds  Problems  Med Hx  Surg Hx  Fam Hx               Review of Systems   Constitutional: Negative for chills and fever.   HENT: Positive for hearing loss. Negative for congestion, ear pain and sore throat.    Eyes: Negative for pain and visual disturbance.   Respiratory: Negative for cough and shortness of breath.    Cardiovascular: Negative for chest pain, palpitations and peripheral edema.   Gastrointestinal: Positive for nausea. Negative for abdominal pain, constipation, diarrhea, heartburn and hematochezia.   Breasts:  Positive for tenderness. Negative for breast mass and discharge.   Genitourinary: Positive for frequency. Negative for dysuria, genital sores, hematuria, pelvic pain, urgency, vaginal bleeding and vaginal discharge.   Musculoskeletal: Negative for arthralgias, joint swelling and myalgias.   Skin: Negative for rash.   Neurological: Positive for headaches. Negative for dizziness, weakness and paresthesias.  "  Psychiatric/Behavioral: Negative for mood changes. The patient is nervous/anxious.           OBJECTIVE:   /78 (Cuff Size: Adult Large)   Pulse 91   Temp 98.5  F (36.9  C) (Temporal)   Resp 18   Ht 1.6 m (5' 3\")   Wt 97.1 kg (214 lb)   LMP 06/30/2022 (Exact Date)   SpO2 97%   BMI 37.91 kg/m    Physical Exam  GENERAL: healthy, alert and no distress  EYES: Eyes grossly normal to inspection, PERRL and conjunctivae and sclerae normal  HENT: ear canals and TM's normal, nose and mouth without ulcers or lesions  NECK: no adenopathy, no asymmetry, masses, or scars and thyroid normal to palpation  RESP: lungs clear to auscultation - no rales, rhonchi or wheezes  BREAST: normal without masses, tenderness or nipple discharge and no palpable axillary masses or adenopathy  CV: regular rate and rhythm, normal S1 S2, no S3 or S4, no murmur, click or rub, no peripheral edema and peripheral pulses strong  ABDOMEN: soft, nontender, no hepatosplenomegaly, no masses and bowel sounds normal   (female): normal female external genitalia, normal urethral meatus, vaginal mucosa pink, moist, well rugated, and normal cervix/adnexa/uterus without masses or discharge  MS: no gross musculoskeletal defects noted, no edema  SKIN: no suspicious lesions or rashes  NEURO: Normal strength and tone, mentation intact and speech normal  PSYCH: mentation appears normal, affect normal/bright        ASSESSMENT/PLAN:       ICD-10-CM    1. Routine general medical examination at a health care facility  Z00.00    2. Idiopathic thrombocytopenia (H)  D69.3 CBC with platelets     CBC with platelets   3. H/O: iron deficiency anemia  Z86.2 CBC with platelets     CBC with platelets   4. Morbid obesity (H)  E66.01    5. Mild persistent asthma with exacerbation  J45.31 albuterol (PROAIR HFA) 108 (90 Base) MCG/ACT inhaler     fluticasone-salmeterol (ADVAIR DISKUS) 500-50 MCG/ACT inhaler     albuterol (PROVENTIL) (2.5 MG/3ML) 0.083% neb solution   6. " Menorrhagia with regular cycle  N92.0 norgestimate-ethinyl estradiol (ORTHO-CYCLEN) 0.25-35 MG-MCG tablet   7. Attention deficit hyperactivity disorder (ADHD), unspecified ADHD type  F90.9 Methylphenidate HCl ER 36 MG 24H tablet     Drug Abuse Screen Panel 13, Urine (Pain Care Package) - lab collect     Drug Abuse Screen Panel 13, Urine (Pain Care Package) - lab collect   8. Weight loss  R63.4 TSH with free T4 reflex     TSH with free T4 reflex   9. Screening for STDs (sexually transmitted diseases)  Z11.3 NEISSERIA GONORRHOEA PCR     CHLAMYDIA TRACHOMATIS PCR     Treponema Abs w Reflex to RPR and Titer     NEISSERIA GONORRHOEA PCR     CHLAMYDIA TRACHOMATIS PCR     Treponema Abs w Reflex to RPR and Titer   10. Screening for HIV (human immunodeficiency virus)  Z11.4 HIV Antigen Antibody Combo     HIV Antigen Antibody Combo   11. Need for hepatitis C screening test  Z11.59 Hepatitis C Screen Reflex to HCV RNA Quant and Genotype     Hepatitis C Screen Reflex to HCV RNA Quant and Genotype   12. Cervical cancer screening  Z12.4        Patient has been having poorly controlled asthma so we did restart her controller and planned on getting a new ACT in 1 month.  We will restart her ADHD medications and plan follow-up in 1 month.  She is due for recheck on her labs which are drawn today  She was concerned about the pain she was having with sex though we did evaluate and had normal external genitalia and was able to stretch to the size of a small speculum and we did her Pap smear today.  She seems tense and uncomfortable but the tissue is pliable and she does appear to have normal exam.  Tetanus and COVID vaccination were encouraged today as well and she declined both as she feels the Pap was traumatic enough for the day she plans to do these upon her return in approximately 1 month for her ADHD    Patient has been advised of split billing requirements and indicates understanding: Yes    COUNSELING:  Reviewed preventive  "health counseling, as reflected in patient instructions       Regular exercise       Healthy diet/nutrition    Estimated body mass index is 37.91 kg/m  as calculated from the following:    Height as of this encounter: 1.6 m (5' 3\").    Weight as of this encounter: 97.1 kg (214 lb).    Weight management plan: Discussed healthy diet and exercise guidelines    She reports that she has never smoked. She has never used smokeless tobacco.      Counseling Resources:  ATP IV Guidelines  Pooled Cohorts Equation Calculator  Breast Cancer Risk Calculator  BRCA-Related Cancer Risk Assessment: FHS-7 Tool  FRAX Risk Assessment  ICSI Preventive Guidelines  Dietary Guidelines for Americans, 2010  USDA's MyPlate  ASA Prophylaxis  Lung CA Screening    Debbie Lewis MD, MD  [facility]  Answers for HPI/ROS submitted by the patient on 7/20/2022  If you checked off any problems, how difficult have these problems made it for you to do your work, take care of things at home, or get along with other people?: Somewhat difficult  PHQ9 TOTAL SCORE: 6      "

## 2022-07-20 NOTE — TELEPHONE ENCOUNTER
ACT given today plan follow-up in 1 month (postpone) with her as a call and if controlled (>20) then plan follow-up in 6 months.  If uncontrolled please schedule appointment  Debbie Lewis MD

## 2022-07-21 LAB
C TRACH DNA SPEC QL NAA+PROBE: NEGATIVE
HCV AB SERPL QL IA: NONREACTIVE
HIV 1+2 AB+HIV1 P24 AG SERPL QL IA: NONREACTIVE
N GONORRHOEA DNA SPEC QL NAA+PROBE: NEGATIVE
T PALLIDUM AB SER QL: NONREACTIVE

## 2022-07-22 LAB
BKR LAB AP GYN ADEQUACY: NORMAL
BKR LAB AP GYN INTERPRETATION: NORMAL
BKR LAB AP HPV REFLEX: NO
BKR LAB AP LMP: NORMAL
BKR LAB AP PREVIOUS ABNORMAL: NORMAL
PATH REPORT.COMMENTS IMP SPEC: NORMAL
PATH REPORT.COMMENTS IMP SPEC: NORMAL
PATH REPORT.RELEVANT HX SPEC: NORMAL

## 2022-07-25 ENCOUNTER — TELEPHONE (OUTPATIENT)
Dept: FAMILY MEDICINE | Facility: OTHER | Age: 22
End: 2022-07-25

## 2022-07-25 DIAGNOSIS — J45.31 MILD PERSISTENT ASTHMA WITH EXACERBATION: ICD-10-CM

## 2022-07-25 RX ORDER — ALBUTEROL SULFATE 0.83 MG/ML
2.5 SOLUTION RESPIRATORY (INHALATION) EVERY 4 HOURS PRN
Qty: 225 ML | Refills: 4 | Status: SHIPPED | OUTPATIENT
Start: 2022-07-25 | End: 2023-03-08

## 2022-07-25 NOTE — TELEPHONE ENCOUNTER
"Called pharmacy to clarify.     albuterol (PROVENTIL) (2.5 MG/3ML) 0.083% neb solutionalbuterol (PROVENTIL) (2.5 MG/3ML) 0.083% neb solution    Smallest package size is the 75 ml (one box) or it can come as 225 ml (three boxes)    3 ml was written for \"disp\" and this is only one ampule.     Please resend RX with correct quantity.    BREANNE YanN, RN  [facility]  July 25, 2022    "

## 2022-07-25 NOTE — TELEPHONE ENCOUNTER
Please clarify quantity ordered , did you want one box or 75 ML ?? Or did you want 3 boxes for 225 ML ? Thanks

## 2022-08-23 NOTE — TELEPHONE ENCOUNTER
Attempted to reach the patient with the following information.  Left message for patient to return call to clinic.   Patient is active on [employer] message also sent.     Lydia Winters MA

## 2022-10-09 ENCOUNTER — HEALTH MAINTENANCE LETTER (OUTPATIENT)
Age: 22
End: 2022-10-09

## 2022-10-11 ENCOUNTER — MYC MEDICAL ADVICE (OUTPATIENT)
Dept: FAMILY MEDICINE | Facility: OTHER | Age: 22
End: 2022-10-11

## 2022-10-11 NOTE — TELEPHONE ENCOUNTER
Jose D Romero,     They will try to get the images without contrast, but if unable then they will use contrast and this will be through an IV. They won't know until they start that day, but they will let you know if they will need to start one.      Take Care,  Halle Medina, BREANNEN, RN  [facility]  October 11, 2022

## 2022-11-03 ENCOUNTER — OFFICE VISIT (OUTPATIENT)
Dept: CARDIOLOGY | Facility: CLINIC | Age: 22
End: 2022-11-03
Payer: MEDICAID

## 2022-11-03 ENCOUNTER — ANCILLARY PROCEDURE (OUTPATIENT)
Dept: CARDIOLOGY | Facility: CLINIC | Age: 22
End: 2022-11-03
Attending: PEDIATRICS
Payer: MEDICAID

## 2022-11-03 VITALS
SYSTOLIC BLOOD PRESSURE: 119 MMHG | HEIGHT: 63 IN | HEART RATE: 105 BPM | DIASTOLIC BLOOD PRESSURE: 75 MMHG | OXYGEN SATURATION: 98 % | BODY MASS INDEX: 38.16 KG/M2 | RESPIRATION RATE: 12 BRPM | WEIGHT: 215.39 LBS

## 2022-11-03 DIAGNOSIS — Q25.40 CONGENITAL ANOMALY OF AORTIC ARCH: ICD-10-CM

## 2022-11-03 DIAGNOSIS — Q25.40 CONGENITAL ANOMALY OF AORTIC ARCH: Primary | ICD-10-CM

## 2022-11-03 PROCEDURE — 99213 OFFICE O/P EST LOW 20 MIN: CPT | Mod: 25 | Performed by: PEDIATRICS

## 2022-11-03 PROCEDURE — 93320 DOPPLER ECHO COMPLETE: CPT | Performed by: PEDIATRICS

## 2022-11-03 PROCEDURE — 93303 ECHO TRANSTHORACIC: CPT | Performed by: PEDIATRICS

## 2022-11-03 PROCEDURE — 93325 DOPPLER ECHO COLOR FLOW MAPG: CPT | Performed by: PEDIATRICS

## 2022-11-03 ASSESSMENT — PATIENT HEALTH QUESTIONNAIRE - PHQ9: SUM OF ALL RESPONSES TO PHQ QUESTIONS 1-9: 10

## 2022-11-03 NOTE — NURSING NOTE
Depression Response    Patient completed the PHQ-9 assessment for depression and scored >9? Yes  Question 9 on the PHQ-9 was positive for suicidality? No  Does patient have current mental health provider? No    Is this a virtual visit? No    I personally notified the following: visit provider     NYDIA Mccallum

## 2022-11-03 NOTE — PROGRESS NOTES
"                                               [facility]  Date: 11/3/2022      Debbie Lewis MD  [address] [facility] [address]      PATIENT: Heather Lovett  :          2000   MIKE:          11/3/2022    Dear Dr. Lewis:    Heather is 22 years old and was seen at the [facility] on 11/3/2022.   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  Over the last couple years she has remained asymptomatic.  She has gained some weight.  She has a job working in a factory and is now living with her boyfriend.  She has not experienced any chest pain, palpitations or syncope.  She is compliant occasionally has some emesis after she first lies down.  Taking medication for reflux seem to make this worse and she stopped.    On physical examination her height was 1.61 m (5' 3.39\") and her weight was 97.7 kg (215 lb 6.2 oz).  Her heart rate was 105  and respirations 12 per minute.  The blood pressure in her right arm was 119/75.  She was acyanotic, warm and well perfused. She was alert cooperative and in no distress.  Her lungs were clear to auscultation without respiratory distress.  She had a regular rhythm with a soft grade 1/6 to 2/6 systolic ejection murmur at the upper right sternal border.  The second heart sound was physiologically split with a normal pulmonary component.   There was no organomegaly or abdominal tenderness.  Peripheral pulses were 2+ and equal in all extremities.  There was no clubbing or edema.    An echocardiogram performed today that I personally reviewed showed a right-sided Gothic aortic arch with a peak gradient of 30 mmHg and a mean gradient of 9 mmHg.  There is normal flow in the abdominal aorta, and no left ventricular hypertrophy.  I explained these findings to her.  She did agree for her mother to talk to me as well if she " has questions.    Heather has a cervical Gothic aortic arch with a fold that produces mild obstruction, a calculated 20 mmHg gradient with normal distal flow and no left ventricular hypertrophy.  She does not need activity restrictions.  I do think that she is at a slightly higher risk of having a child with congenital heart disease, and should she become pregnant we would want to look at the baby's heart at 18 to 20 weeks.  She might also have trouble with increased gradients across her aortic arch fold during pregnancy that would not be detectable by checking her blood pressure.  She does not need activity restrictions.  I would like to see her in follow-up in 2 years.  She did score 10 on her PHQ today, although I saw you got a to her when you saw her last July.  You may wish to follow-up on that again when you see her in return.  I did give you a phone call today as well.    Thank you very much for your confidence in allowing me to participate in Heather's care.  If you have any questions or concerns, please don't hesitate to contact me.    Sincerely,      Del Walters M.D.   Pediatric Cardiology   [facility]  [facility]  [phone]    Note: Chart documentation done in part with Dragon Voice Recognition software. Although reviewed after completion, some word and grammatical errors may remain.

## 2022-11-04 DIAGNOSIS — Q25.40 CONGENITAL ANOMALY OF AORTIC ARCH: Primary | ICD-10-CM

## 2022-11-07 ENCOUNTER — TELEPHONE (OUTPATIENT)
Dept: FAMILY MEDICINE | Facility: OTHER | Age: 22
End: 2022-11-07

## 2022-11-07 NOTE — TELEPHONE ENCOUNTER
Had phq9 of 10 at cardiology, they sent message to have us follow-up on this. Should be seen to discuss. [facility] will send message this way first.  Debbie Lewis MD

## 2022-11-17 NOTE — TELEPHONE ENCOUNTER
No response to mychart, may do evisit, virtual, or in person. But positive responses to phq9 should be directed to appt  Debbie Lewis MD

## 2022-11-23 NOTE — TELEPHONE ENCOUNTER
Needed call to schedule for mood follow-up. Was sent last week with no call made. Please reach out to this patient.  Debbie Lewis MD

## 2023-03-08 ENCOUNTER — OFFICE VISIT (OUTPATIENT)
Dept: FAMILY MEDICINE | Facility: OTHER | Age: 23
End: 2023-03-08
Payer: MEDICAID

## 2023-03-08 VITALS
OXYGEN SATURATION: 96 % | RESPIRATION RATE: 20 BRPM | SYSTOLIC BLOOD PRESSURE: 110 MMHG | WEIGHT: 229 LBS | TEMPERATURE: 97.9 F | DIASTOLIC BLOOD PRESSURE: 80 MMHG | BODY MASS INDEX: 40.57 KG/M2 | HEIGHT: 63 IN | HEART RATE: 88 BPM

## 2023-03-08 DIAGNOSIS — J45.31 MILD PERSISTENT ASTHMA WITH EXACERBATION: ICD-10-CM

## 2023-03-08 DIAGNOSIS — N92.0 MENORRHAGIA WITH REGULAR CYCLE: ICD-10-CM

## 2023-03-08 DIAGNOSIS — E66.01 MORBID OBESITY (H): ICD-10-CM

## 2023-03-08 PROCEDURE — 90715 TDAP VACCINE 7 YRS/> IM: CPT | Performed by: FAMILY MEDICINE

## 2023-03-08 PROCEDURE — 90471 IMMUNIZATION ADMIN: CPT | Performed by: FAMILY MEDICINE

## 2023-03-08 PROCEDURE — 99214 OFFICE O/P EST MOD 30 MIN: CPT | Mod: 25 | Performed by: FAMILY MEDICINE

## 2023-03-08 RX ORDER — ALBUTEROL SULFATE 90 UG/1
2 AEROSOL, METERED RESPIRATORY (INHALATION) EVERY 4 HOURS PRN
Qty: 18 G | Refills: 0 | Status: SHIPPED | OUTPATIENT
Start: 2023-03-08 | End: 2023-04-27

## 2023-03-08 RX ORDER — ALBUTEROL SULFATE 0.83 MG/ML
2.5 SOLUTION RESPIRATORY (INHALATION) EVERY 4 HOURS PRN
Qty: 225 ML | Refills: 4 | Status: SHIPPED | OUTPATIENT
Start: 2023-03-08 | End: 2023-11-15

## 2023-03-08 RX ORDER — FLUTICASONE PROPIONATE 220 UG/1
1 AEROSOL, METERED RESPIRATORY (INHALATION) 2 TIMES DAILY
Qty: 12 G | Refills: 1 | Status: SHIPPED | OUTPATIENT
Start: 2023-03-08 | End: 2023-04-27

## 2023-03-08 RX ORDER — NORGESTIMATE AND ETHINYL ESTRADIOL 0.25-0.035
1 KIT ORAL DAILY
Qty: 90 TABLET | Refills: 3 | Status: SHIPPED | OUTPATIENT
Start: 2023-03-08 | End: 2023-06-08

## 2023-03-08 ASSESSMENT — ANXIETY QUESTIONNAIRES
GAD7 TOTAL SCORE: 7
2. NOT BEING ABLE TO STOP OR CONTROL WORRYING: MORE THAN HALF THE DAYS
4. TROUBLE RELAXING: SEVERAL DAYS
5. BEING SO RESTLESS THAT IT IS HARD TO SIT STILL: NOT AT ALL
3. WORRYING TOO MUCH ABOUT DIFFERENT THINGS: MORE THAN HALF THE DAYS
7. FEELING AFRAID AS IF SOMETHING AWFUL MIGHT HAPPEN: NOT AT ALL
GAD7 TOTAL SCORE: 7
6. BECOMING EASILY ANNOYED OR IRRITABLE: SEVERAL DAYS
1. FEELING NERVOUS, ANXIOUS, OR ON EDGE: SEVERAL DAYS
IF YOU CHECKED OFF ANY PROBLEMS ON THIS QUESTIONNAIRE, HOW DIFFICULT HAVE THESE PROBLEMS MADE IT FOR YOU TO DO YOUR WORK, TAKE CARE OF THINGS AT HOME, OR GET ALONG WITH OTHER PEOPLE: SOMEWHAT DIFFICULT

## 2023-03-08 ASSESSMENT — ASTHMA QUESTIONNAIRES
QUESTION_1 LAST FOUR WEEKS HOW MUCH OF THE TIME DID YOUR ASTHMA KEEP YOU FROM GETTING AS MUCH DONE AT WORK, SCHOOL OR AT HOME: SOME OF THE TIME
QUESTION_3 LAST FOUR WEEKS HOW OFTEN DID YOUR ASTHMA SYMPTOMS (WHEEZING, COUGHING, SHORTNESS OF BREATH, CHEST TIGHTNESS OR PAIN) WAKE YOU UP AT NIGHT OR EARLIER THAN USUAL IN THE MORNING: ONCE OR TWICE
ACT_TOTALSCORE: 16
QUESTION_4 LAST FOUR WEEKS HOW OFTEN HAVE YOU USED YOUR RESCUE INHALER OR NEBULIZER MEDICATION (SUCH AS ALBUTEROL): ONCE A WEEK OR LESS
QUESTION_2 LAST FOUR WEEKS HOW OFTEN HAVE YOU HAD SHORTNESS OF BREATH: ONCE A DAY
QUESTION_5 LAST FOUR WEEKS HOW WOULD YOU RATE YOUR ASTHMA CONTROL: SOMEWHAT CONTROLLED
ACT_TOTALSCORE: 16

## 2023-03-08 ASSESSMENT — PAIN SCALES - GENERAL: PAINLEVEL: NO PAIN (0)

## 2023-03-08 NOTE — PROGRESS NOTES
Assessment & Plan       ICD-10-CM    1. Morbid obesity (H)  E66.01       2. Mild persistent asthma with exacerbation  J45.31 albuterol (PROAIR HFA) 108 (90 Base) MCG/ACT inhaler     albuterol (PROVENTIL) (2.5 MG/3ML) 0.083% neb solution     fluticasone (FLOVENT HFA) 220 MCG/ACT inhaler      3. Menorrhagia with regular cycle  N92.0 norgestimate-ethinyl estradiol (ORTHO-CYCLEN) 0.25-35 MG-MCG tablet        Patient has been struggling with the use of the Advair as she finds that she cannot quite get it to crushed and oftentimes ends up with chunks.  We did try to do some education with her and she says that she preferred back when she was using a OptiChamber and we did change her to a steroid inhaler that should be able to be easier for her to use.  As this will eliminate to the long-acting beta agonist we are not sure if it will give her quite as much control by getting the meds where they belong is likely necessary for her to get better control.  We did give her an ACT today to return in 1 month to see how you are doing and we can reevaluate at that time her medications as needed.  She has been happy with her birth control and is interested in renewing this at this time.  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  She also had a history of ADHD and potentially could be benefited from having a stimulant for when she is up and working though because have her shift and the way that she tries to go to sleep soon after the shift she is not sure that she felt as good with this use.  As well as she feels like she is got habits routines to manage her ADHD without medications at this point.  Though we did talk about a regular exercise routine could help with her focus and concentration better as well.  Agreed to tetanus today though is fearful of vaccinations and  "so COVID and flu were avoided today.      30 minutes spent on the date of the encounter doing chart review, history and exam, documentation and further activities per the note       BMI:   Estimated body mass index is 40.32 kg/m  as calculated from the following:    Height as of this encounter: 1.605 m (5' 3.19\").    Weight as of this encounter: 103.9 kg (229 lb).   Weight management plan: Discussed healthy diet and exercise guidelines        No follow-ups on file.    Debbie Lewis MD, MD  [facility] CHALO Romero is a 23 year old, presenting for the following health issues:  Recheck Medication      History of Present Illness       Reason for visit:  Mood check    She eats 0-1 servings of fruits and vegetables daily.She consumes 1 sweetened beverage(s) daily.She exercises with enough effort to increase her heart rate 9 or less minutes per day.  She exercises with enough effort to increase her heart rate 3 or less days per week.   She is taking medications regularly.       Anxiety Follow-Up    How are you doing with your anxiety since your last visit? No change    Are you having other symptoms that might be associated with anxiety? No    Have you had a significant life event? Grief or Loss     Are you feeling depressed? No    Do you have any concerns with your use of alcohol or other drugs? No    Social History     Tobacco Use     Smoking status: Never     Smokeless tobacco: Never   Vaping Use     Vaping Use: Never used   Substance Use Topics     Alcohol use: Yes     Comment: occasionally     Drug use: No     JOHN-7 SCORE 6/29/2021 10/15/2021 3/8/2023   Total Score 5 (mild anxiety) - -   Total Score 5 2 7     PHQ 10/15/2021 7/20/2022 11/3/2022   PHQ-9 Total Score 3 6 10   Q9: Thoughts of better off dead/self-harm past 2 weeks Not at all Not at all Not at all     JOHN-7  3/8/2023   1. Feeling nervous, anxious, or on edge 1   2. Not being able to stop or control worrying 2   3. Worrying too " "much about different things 2   4. Trouble relaxing 1   5. Being so restless that it is hard to sit still 0   6. Becoming easily annoyed or irritable 1   7. Feeling afraid, as if something awful might happen 0   JOHN-7 Total Score 7   If you checked any problems, how difficult have they made it for you to do your work, take care of things at home, or get along with other people? Somewhat difficult           Review of Systems   Constitutional, HEENT, cardiovascular, pulmonary, GI, , musculoskeletal, neuro, skin, endocrine and psych systems are negative, except as otherwise noted.      Objective    /80   Pulse 88   Temp 97.9  F (36.6  C) (Temporal)   Resp 20   Ht 1.605 m (5' 3.19\")   Wt 103.9 kg (229 lb)   LMP 03/03/2023 (Exact Date)   SpO2 96%   BMI 40.32 kg/m    Body mass index is 40.32 kg/m .  Physical Exam   GENERAL: healthy, alert and no distress  RESP: lungs clear to auscultation - no rales, rhonchi or wheezes  CV: regular rate and rhythm, normal S1 S2, no S3 or S4, no murmur, click or rub, no peripheral edema and peripheral pulses strong  ABDOMEN: soft, nontender, no hepatosplenomegaly, no masses and bowel sounds normal  MS: no gross musculoskeletal defects noted, no edema  SKIN: no suspicious lesions or rashes  NEURO: Normal strength and tone, mentation intact and speech normal  PSYCH: mentation appears normal, though education needed to be brought to a basic level to distribute information                    "

## 2023-04-11 NOTE — TELEPHONE ENCOUNTER
Message has been read by guardian. They have all numbers for scheduling. Closing encounter.    11-Apr-2023

## 2023-04-17 ENCOUNTER — TELEPHONE (OUTPATIENT)
Dept: FAMILY MEDICINE | Facility: OTHER | Age: 23
End: 2023-04-17

## 2023-04-17 NOTE — TELEPHONE ENCOUNTER
Prior Authorization Retail Medication Request    Medication/Dose: fluticasone (FLOVENT HFA) 220 MCG/ACT inhaler  ICD code (if different than what is on RX):    Previously Tried and Failed:    Rationale:      Insurance Name:    Insurance ID:        Pharmacy Information (if different than what is on RX)  Name:    Phone:

## 2023-04-19 NOTE — TELEPHONE ENCOUNTER
Submitting to [facility] 04/25/2023      PA Initiation [employer] Denied    Medication: fluticasone (FLOVENT HFA) 220 MCG/ACT inhaler PA INITIATED  Insurance Company: [employer] - Phone [phone] Fax [phone]        Pharmacy Filling the Rx: [facility] [address] [place] [address]  Filling Pharmacy Phone: [phone]  Filling Pharmacy Fax:    Start Date: 4/19/2023    Central Prior Authorization Team   Phone: [phone]

## 2023-04-24 ENCOUNTER — TELEPHONE (OUTPATIENT)
Dept: FAMILY MEDICINE | Facility: OTHER | Age: 23
End: 2023-04-24
Payer: COMMERCIAL

## 2023-04-24 DIAGNOSIS — J45.31 MILD PERSISTENT ASTHMA WITH EXACERBATION: ICD-10-CM

## 2023-04-24 NOTE — TELEPHONE ENCOUNTER
Prior Authorization Retail Medication Request    Medication/Dose: albuterol (PROAIR HFA) 108 (90 Base) MCG/ACT inhaler  ICD code (if different than what is on RX):    Previously Tried and Failed:    Rationale:      Insurance Name:    Insurance ID:        Pharmacy Information (if different than what is on RX)  Name:    Phone:

## 2023-04-27 RX ORDER — FLUTICASONE PROPIONATE 220 UG/1
1 AEROSOL, METERED RESPIRATORY (INHALATION) 2 TIMES DAILY
Qty: 12 G | Refills: 1 | Status: SHIPPED | OUTPATIENT
Start: 2023-04-27 | End: 2024-04-10

## 2023-04-27 RX ORDER — ALBUTEROL SULFATE 90 UG/1
2 AEROSOL, METERED RESPIRATORY (INHALATION) EVERY 4 HOURS PRN
Qty: 18 G | Refills: 0 | Status: SHIPPED | OUTPATIENT
Start: 2023-04-27 | End: 2024-04-10

## 2023-04-27 NOTE — TELEPHONE ENCOUNTER
PRIOR AUTHORIZATION DENIED    Medication: fluticasone (FLOVENT HFA) 220 MCG/ACT inhaler PA DENIED    Denial Date: 4/27/2023    Denial Rational:       Appeal Information:

## 2023-04-27 NOTE — TELEPHONE ENCOUNTER
Prior Authorization Not Needed per Insurance    Medication: Ventolin PA  NOT NEEDED Brand Only Because Secondary Ins pays for brand  Insurance Company: [employer] - Phone [phone] Fax [phone]  Expected CoPay:      Pharmacy Filling the Rx: [facility] [address] [place] [address]  Pharmacy Notified: Yes  Patient Notified: Comment:  Pharmacy will notify patient.          PA Initiation    Medication: Ventolin PA INITIATED Brand Only Because Secondary Ins pays for brand  Insurance Company: [employer] - Phone [phone] Fax [phone]  Pharmacy Filling the Rx: [facility] [address] [place] [address]  Filling Pharmacy Phone: [phone]  Filling Pharmacy Fax:    Start Date: 4/27/2023    Central Prior Authorization Team   Phone: [phone]

## 2023-06-08 ENCOUNTER — ANCILLARY PROCEDURE (OUTPATIENT)
Dept: GENERAL RADIOLOGY | Facility: CLINIC | Age: 23
End: 2023-06-08
Attending: PHYSICIAN ASSISTANT
Payer: COMMERCIAL

## 2023-06-08 ENCOUNTER — OFFICE VISIT (OUTPATIENT)
Dept: FAMILY MEDICINE | Facility: CLINIC | Age: 23
End: 2023-06-08
Payer: COMMERCIAL

## 2023-06-08 VITALS
TEMPERATURE: 98.7 F | HEIGHT: 63 IN | RESPIRATION RATE: 16 BRPM | DIASTOLIC BLOOD PRESSURE: 84 MMHG | BODY MASS INDEX: 39.55 KG/M2 | SYSTOLIC BLOOD PRESSURE: 116 MMHG | WEIGHT: 223.2 LBS | OXYGEN SATURATION: 97 % | HEART RATE: 83 BPM

## 2023-06-08 DIAGNOSIS — S99.911A ANKLE INJURY, RIGHT, INITIAL ENCOUNTER: ICD-10-CM

## 2023-06-08 DIAGNOSIS — J30.1 SEASONAL ALLERGIC RHINITIS DUE TO POLLEN: ICD-10-CM

## 2023-06-08 DIAGNOSIS — J45.40 MODERATE PERSISTENT ASTHMA, UNSPECIFIED WHETHER COMPLICATED: Primary | ICD-10-CM

## 2023-06-08 DIAGNOSIS — R53.83 FATIGUE, UNSPECIFIED TYPE: ICD-10-CM

## 2023-06-08 LAB
BASOPHILS # BLD AUTO: 0 10E3/UL (ref 0–0.2)
BASOPHILS NFR BLD AUTO: 0 %
EOSINOPHIL # BLD AUTO: 0.1 10E3/UL (ref 0–0.7)
EOSINOPHIL NFR BLD AUTO: 1 %
ERYTHROCYTE [DISTWIDTH] IN BLOOD BY AUTOMATED COUNT: 13 % (ref 10–15)
HCT VFR BLD AUTO: 43.4 % (ref 35–47)
HGB BLD-MCNC: 14 G/DL (ref 11.7–15.7)
IMM GRANULOCYTES # BLD: 0 10E3/UL
IMM GRANULOCYTES NFR BLD: 0 %
LYMPHOCYTES # BLD AUTO: 1.6 10E3/UL (ref 0.8–5.3)
LYMPHOCYTES NFR BLD AUTO: 38 %
MCH RBC QN AUTO: 28.6 PG (ref 26.5–33)
MCHC RBC AUTO-ENTMCNC: 32.3 G/DL (ref 31.5–36.5)
MCV RBC AUTO: 89 FL (ref 78–100)
MONOCYTES # BLD AUTO: 0.3 10E3/UL (ref 0–1.3)
MONOCYTES NFR BLD AUTO: 7 %
NEUTROPHILS # BLD AUTO: 2.3 10E3/UL (ref 1.6–8.3)
NEUTROPHILS NFR BLD AUTO: 54 %
PLATELET # BLD AUTO: 167 10E3/UL (ref 150–450)
RBC # BLD AUTO: 4.89 10E6/UL (ref 3.8–5.2)
TSH SERPL DL<=0.005 MIU/L-ACNC: 3.2 UIU/ML (ref 0.3–4.2)
WBC # BLD AUTO: 4.3 10E3/UL (ref 4–11)

## 2023-06-08 PROCEDURE — 85025 COMPLETE CBC W/AUTO DIFF WBC: CPT | Performed by: PHYSICIAN ASSISTANT

## 2023-06-08 PROCEDURE — 99214 OFFICE O/P EST MOD 30 MIN: CPT | Performed by: PHYSICIAN ASSISTANT

## 2023-06-08 PROCEDURE — 73630 X-RAY EXAM OF FOOT: CPT | Mod: TC | Performed by: RADIOLOGY

## 2023-06-08 PROCEDURE — 36415 COLL VENOUS BLD VENIPUNCTURE: CPT | Performed by: PHYSICIAN ASSISTANT

## 2023-06-08 PROCEDURE — 73610 X-RAY EXAM OF ANKLE: CPT | Mod: TC | Performed by: RADIOLOGY

## 2023-06-08 PROCEDURE — 84443 ASSAY THYROID STIM HORMONE: CPT | Performed by: PHYSICIAN ASSISTANT

## 2023-06-08 RX ORDER — ALBUTEROL SULFATE 90 UG/1
2 AEROSOL, METERED RESPIRATORY (INHALATION) EVERY 6 HOURS PRN
Qty: 36 G | Refills: 1 | Status: SHIPPED | OUTPATIENT
Start: 2023-06-08 | End: 2023-11-15

## 2023-06-08 RX ORDER — BUDESONIDE AND FORMOTEROL FUMARATE DIHYDRATE 160; 4.5 UG/1; UG/1
2 AEROSOL RESPIRATORY (INHALATION) 2 TIMES DAILY
Qty: 10.2 G | Refills: 1 | Status: SHIPPED | OUTPATIENT
Start: 2023-06-08 | End: 2023-06-09

## 2023-06-08 RX ORDER — CETIRIZINE HYDROCHLORIDE 10 MG/1
10 TABLET ORAL DAILY
Qty: 90 TABLET | Refills: 1 | Status: SHIPPED | OUTPATIENT
Start: 2023-06-08 | End: 2023-11-15

## 2023-06-08 ASSESSMENT — PAIN SCALES - GENERAL: PAINLEVEL: MILD PAIN (3)

## 2023-06-08 ASSESSMENT — ASTHMA QUESTIONNAIRES
QUESTION_3 LAST FOUR WEEKS HOW OFTEN DID YOUR ASTHMA SYMPTOMS (WHEEZING, COUGHING, SHORTNESS OF BREATH, CHEST TIGHTNESS OR PAIN) WAKE YOU UP AT NIGHT OR EARLIER THAN USUAL IN THE MORNING: FOUR OR MORE NIGHTS A WEEK
QUESTION_4 LAST FOUR WEEKS HOW OFTEN HAVE YOU USED YOUR RESCUE INHALER OR NEBULIZER MEDICATION (SUCH AS ALBUTEROL): THREE OR MORE TIMES PER DAY
QUESTION_2 LAST FOUR WEEKS HOW OFTEN HAVE YOU HAD SHORTNESS OF BREATH: MORE THAN ONCE A DAY
ACT_TOTALSCORE: 10
QUESTION_1 LAST FOUR WEEKS HOW MUCH OF THE TIME DID YOUR ASTHMA KEEP YOU FROM GETTING AS MUCH DONE AT WORK, SCHOOL OR AT HOME: A LITTLE OF THE TIME
EMERGENCY_ROOM_LAST_YEAR_TOTAL: ONE
QUESTION_5 LAST FOUR WEEKS HOW WOULD YOU RATE YOUR ASTHMA CONTROL: SOMEWHAT CONTROLLED
ACT_TOTALSCORE: 10

## 2023-06-08 ASSESSMENT — ENCOUNTER SYMPTOMS: WHEEZING: 1

## 2023-06-08 NOTE — PATIENT INSTRUCTIONS
Controller inhaler- I sent a new rx for symbicort . Use 2 puffs twice daily. Use every day. If this is not covered please have the pharmacy call us for an alternative.    Rescue inhaler- Ventolin (albuterol). You can still use the albuterol when needed- 2 puffs every 4-6hr.     I sent a prescription for cetirizine 10mg take once daily. This will help with allergies and asthma.     See your primary doctor back in 2-3 weeks     Xray for ankle.   Ice it, elevate  Try the new brace.   Ref pt if not getting better
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
## 2023-06-09 DIAGNOSIS — J45.40 MODERATE PERSISTENT ASTHMA, UNSPECIFIED WHETHER COMPLICATED: ICD-10-CM

## 2023-06-09 RX ORDER — BUDESONIDE AND FORMOTEROL FUMARATE DIHYDRATE 160; 4.5 UG/1; UG/1
2 AEROSOL RESPIRATORY (INHALATION) 2 TIMES DAILY
Qty: 10.2 G | Refills: 5 | Status: SHIPPED | OUTPATIENT
Start: 2023-06-09 | End: 2024-04-10

## 2023-06-09 NOTE — TELEPHONE ENCOUNTER
"Fax from pharmacy states:    \"Due to patient having multiple insurances in order to get this covered we need a new Rx with a DAW1 unit for brand name Symbicort. Please send w/ DAW1 on Rx. Thanks!\"    Provider please resend.    Soila Cook CMA ([facility])    "

## 2023-06-20 ENCOUNTER — PATIENT OUTREACH (OUTPATIENT)
Dept: CARE COORDINATION | Facility: CLINIC | Age: 23
End: 2023-06-20
Payer: COMMERCIAL

## 2023-06-28 ENCOUNTER — TELEPHONE (OUTPATIENT)
Dept: FAMILY MEDICINE | Facility: OTHER | Age: 23
End: 2023-06-28
Payer: COMMERCIAL

## 2023-06-28 NOTE — TELEPHONE ENCOUNTER
Patient calling with questions regarding food allergy testing. She is wanting to know if this can be done at the [facility].     Upon review of chart, patient has not yet had the appointment for this testing. Informed patient that it depends on what the provider orders but the [facility] has a lab and can draw standard labs.     No further questions.     Kirstin CORONEL, RN  [facility]

## 2023-07-04 ENCOUNTER — PATIENT OUTREACH (OUTPATIENT)
Dept: CARE COORDINATION | Facility: CLINIC | Age: 23
End: 2023-07-04
Payer: COMMERCIAL

## 2023-07-26 ENCOUNTER — DOCUMENTATION ONLY (OUTPATIENT)
Dept: FAMILY MEDICINE | Facility: CLINIC | Age: 23
End: 2023-07-26
Payer: COMMERCIAL

## 2023-07-26 DIAGNOSIS — S93.401A SPRAIN OF RIGHT ANKLE, UNSPECIFIED LIGAMENT, INITIAL ENCOUNTER: Primary | ICD-10-CM

## 2023-08-18 ENCOUNTER — VIRTUAL VISIT (OUTPATIENT)
Dept: FAMILY MEDICINE | Facility: CLINIC | Age: 23
End: 2023-08-18
Payer: COMMERCIAL

## 2023-08-18 DIAGNOSIS — N63.10 MASS OF RIGHT BREAST, UNSPECIFIED QUADRANT: Primary | ICD-10-CM

## 2023-08-18 PROCEDURE — 99214 OFFICE O/P EST MOD 30 MIN: CPT | Mod: VID | Performed by: FAMILY MEDICINE

## 2023-08-18 ASSESSMENT — ASTHMA QUESTIONNAIRES: ACT_TOTALSCORE: 18

## 2023-08-18 NOTE — PROGRESS NOTES
Heather is a 23 year old who is being evaluated via a billable video visit.      How would you like to obtain your AVS? MyChart  If the video visit is dropped, the invitation should be resent by: Text to cell phone: [phone]  Will anyone else be joining your video visit? No    Subjective   Heather is a 23 year old, presenting for the following health issues:  Breast Mass        8/18/2023     7:18 AM   Additional Questions   Roomed by Miguel A NAVARRO     Breast Concern  Onset/Duration: couple of months   Description:   Location: upper inner quadrant sometimes lower inner quadrant  Pain or tenderness: YES  Redness: No  Intensity: mild  Progression of Symptoms: same and waxing and waning  Accompanying Signs & Symptoms:  Any lumps in axillary region: No  Movable: YES  Nipple discharge: none  Changes in the skin or nipple: nipples are swollen and area around the nipple is dry   On Hormone therapy: No  Does it change with menstrual cycle: No  Previous history of similar problem: none  First degree relative with breast cancer: a positive family history for breast cancer in her aunt(s).  Precipitating factors:           Worsened by: none  Alleviating factors:            Improved by: none  Therapies tried and outcome: None  Patient's last menstrual period was 07/23/2023 (exact date).        Review of Systems   Constitutional, HEENT, cardiovascular, pulmonary, GI, , musculoskeletal, neuro, skin, endocrine and psych systems are negative, except as otherwise noted.      Objective           Vitals:  No vitals were obtained today due to virtual visit.    Physical Exam   GENERAL: Healthy, alert and no distress  EYES: Eyes grossly normal to inspection.  No discharge or erythema, or obvious scleral/conjunctival abnormalities.  RESP: No audible wheeze, cough, or visible cyanosis.  No visible retractions or increased work of breathing.    SKIN: Visible skin clear. No significant rash, abnormal pigmentation or lesions.  NEURO:  Cranial nerves grossly intact.  Mentation and speech appropriate for age.  PSYCH: Mentation appears normal, affect normal/bright, judgement and insight intact, normal speech and appearance well-groomed.    A/P:  (N63.10) Mass of right breast, unspecified quadrant  (primary encounter diagnosis)  Comment:   Plan: MA Diagnostic Digital Right        Diffs: cyst, abscess, ca. Diagnostic mammo ordered for further evaluation.    Vinay Diaz MD          Video-Visit Details    Type of service:  Video Visit   Video Start Time:  800 AM  Video End Time:8:05 AM    Originating Location (pt. Location): Home    Distant Location (provider location):  On-site  Platform used for Video Visit: Trading Block

## 2023-09-01 ENCOUNTER — ANCILLARY PROCEDURE (OUTPATIENT)
Dept: ULTRASOUND IMAGING | Facility: CLINIC | Age: 23
End: 2023-09-01
Attending: FAMILY MEDICINE
Payer: COMMERCIAL

## 2023-09-01 DIAGNOSIS — N63.10 MASS OF RIGHT BREAST, UNSPECIFIED QUADRANT: ICD-10-CM

## 2023-09-01 PROCEDURE — 76642 ULTRASOUND BREAST LIMITED: CPT | Mod: RT | Performed by: STUDENT IN AN ORGANIZED HEALTH CARE EDUCATION/TRAINING PROGRAM

## 2023-10-28 ENCOUNTER — HEALTH MAINTENANCE LETTER (OUTPATIENT)
Age: 23
End: 2023-10-28

## 2023-11-15 ENCOUNTER — OFFICE VISIT (OUTPATIENT)
Dept: FAMILY MEDICINE | Facility: OTHER | Age: 23
End: 2023-11-15
Payer: COMMERCIAL

## 2023-11-15 VITALS
TEMPERATURE: 98.9 F | HEART RATE: 57 BPM | RESPIRATION RATE: 18 BRPM | DIASTOLIC BLOOD PRESSURE: 78 MMHG | WEIGHT: 222 LBS | SYSTOLIC BLOOD PRESSURE: 126 MMHG | OXYGEN SATURATION: 98 % | BODY MASS INDEX: 39.34 KG/M2 | HEIGHT: 63 IN

## 2023-11-15 DIAGNOSIS — E66.01 MORBID OBESITY (H): Primary | ICD-10-CM

## 2023-11-15 DIAGNOSIS — Z11.3 SCREENING FOR STDS (SEXUALLY TRANSMITTED DISEASES): ICD-10-CM

## 2023-11-15 DIAGNOSIS — J45.40 MODERATE PERSISTENT ASTHMA, UNSPECIFIED WHETHER COMPLICATED: ICD-10-CM

## 2023-11-15 DIAGNOSIS — K21.00 GASTROESOPHAGEAL REFLUX DISEASE WITH ESOPHAGITIS WITHOUT HEMORRHAGE: ICD-10-CM

## 2023-11-15 DIAGNOSIS — J30.1 SEASONAL ALLERGIC RHINITIS DUE TO POLLEN: ICD-10-CM

## 2023-11-15 DIAGNOSIS — J45.31 MILD PERSISTENT ASTHMA WITH EXACERBATION: ICD-10-CM

## 2023-11-15 PROCEDURE — 87491 CHLMYD TRACH DNA AMP PROBE: CPT | Performed by: FAMILY MEDICINE

## 2023-11-15 PROCEDURE — 87591 N.GONORRHOEAE DNA AMP PROB: CPT | Performed by: FAMILY MEDICINE

## 2023-11-15 PROCEDURE — 99214 OFFICE O/P EST MOD 30 MIN: CPT | Performed by: FAMILY MEDICINE

## 2023-11-15 RX ORDER — BUDESONIDE AND FORMOTEROL FUMARATE DIHYDRATE 160; 4.5 UG/1; UG/1
2 AEROSOL RESPIRATORY (INHALATION) 2 TIMES DAILY
Qty: 10.2 G | Refills: 5 | Status: CANCELLED | OUTPATIENT
Start: 2023-11-15

## 2023-11-15 RX ORDER — BUDESONIDE AND FORMOTEROL FUMARATE DIHYDRATE 160; 4.5 UG/1; UG/1
2 AEROSOL RESPIRATORY (INHALATION) 2 TIMES DAILY
Qty: 10.2 G | Refills: 1 | Status: SHIPPED | OUTPATIENT
Start: 2023-11-15 | End: 2024-04-10

## 2023-11-15 RX ORDER — ALBUTEROL SULFATE 0.83 MG/ML
2.5 SOLUTION RESPIRATORY (INHALATION) EVERY 4 HOURS PRN
Qty: 225 ML | Refills: 4 | Status: SHIPPED | OUTPATIENT
Start: 2023-11-15

## 2023-11-15 RX ORDER — TOPIRAMATE 50 MG/1
50 TABLET, FILM COATED ORAL 2 TIMES DAILY
Qty: 60 TABLET | Refills: 1 | Status: SHIPPED | OUTPATIENT
Start: 2023-11-15 | End: 2023-12-13

## 2023-11-15 RX ORDER — OMEPRAZOLE 40 MG/1
40 CAPSULE, DELAYED RELEASE ORAL DAILY
Qty: 90 CAPSULE | Refills: 0 | Status: SHIPPED | OUTPATIENT
Start: 2023-11-15 | End: 2024-03-04

## 2023-11-15 RX ORDER — FLUTICASONE PROPIONATE 220 UG/1
1 AEROSOL, METERED RESPIRATORY (INHALATION) 2 TIMES DAILY
Qty: 12 G | Refills: 1 | Status: CANCELLED | OUTPATIENT
Start: 2023-11-15

## 2023-11-15 RX ORDER — ALBUTEROL SULFATE 90 UG/1
2 AEROSOL, METERED RESPIRATORY (INHALATION) EVERY 6 HOURS PRN
Qty: 36 G | Refills: 1 | Status: SHIPPED | OUTPATIENT
Start: 2023-11-15 | End: 2023-12-13

## 2023-11-15 RX ORDER — CETIRIZINE HYDROCHLORIDE 10 MG/1
10 TABLET ORAL DAILY
Qty: 90 TABLET | Refills: 1 | Status: SHIPPED | OUTPATIENT
Start: 2023-11-15 | End: 2024-04-10

## 2023-11-15 ASSESSMENT — ANXIETY QUESTIONNAIRES
6. BECOMING EASILY ANNOYED OR IRRITABLE: SEVERAL DAYS
5. BEING SO RESTLESS THAT IT IS HARD TO SIT STILL: NOT AT ALL
IF YOU CHECKED OFF ANY PROBLEMS ON THIS QUESTIONNAIRE, HOW DIFFICULT HAVE THESE PROBLEMS MADE IT FOR YOU TO DO YOUR WORK, TAKE CARE OF THINGS AT HOME, OR GET ALONG WITH OTHER PEOPLE: NOT DIFFICULT AT ALL
7. FEELING AFRAID AS IF SOMETHING AWFUL MIGHT HAPPEN: NOT AT ALL
1. FEELING NERVOUS, ANXIOUS, OR ON EDGE: MORE THAN HALF THE DAYS
GAD7 TOTAL SCORE: 8
2. NOT BEING ABLE TO STOP OR CONTROL WORRYING: MORE THAN HALF THE DAYS
3. WORRYING TOO MUCH ABOUT DIFFERENT THINGS: MORE THAN HALF THE DAYS
4. TROUBLE RELAXING: SEVERAL DAYS
GAD7 TOTAL SCORE: 8

## 2023-11-15 ASSESSMENT — PAIN SCALES - GENERAL: PAINLEVEL: NO PAIN (0)

## 2023-11-15 ASSESSMENT — ASTHMA QUESTIONNAIRES: ACT_TOTALSCORE: 17

## 2023-11-15 NOTE — COMMUNITY RESOURCES LIST (ENGLISH)
11/15/2023   [facility]  N/A  For additional resource needs, please contact your health insurance member services or your primary care team.  Phone: [phone]   Email: N/A   Address: [address] [place] [address]   Hours: N/A        Housing       Housing search assistance  1  [facility] -  & Public Health Distance: 13.74 miles      Phone/Virtual   [address] [place] [address]  Language: English  Hours: Mon - Fri 8:00 AM - 4:30 PM  Fees: Free   Phone: [phone] Email: [email or web address]. Website: [email or web address]          Important Numbers & Websites       [facility]   [address]org  Poison Control   (372) 582-3404 [place]  Suicide and Crisis Lifeline   [address] [place], [place] [address]line.org  Childhelp National Child Abuse Hotline   355.751.7222 Childhelphotline.org  National Sexual Assault Hotline   (712) 586-6350 (HOPE) [facility]  National Runaway Safeline   (669) 966-5486 (RUNAWAY) [facility]org  Pregnancy & Postpartum Support [place]   Call/text 826-670-0684 Ppsupport[place].org  Substance Abuse National Helpline ([facility]   396-024-HELP (2169) Findtreatment.gov  Emergency Services   911

## 2023-11-15 NOTE — PROGRESS NOTES
Assessment & Plan       ICD-10-CM    1. Morbid obesity (H)  E66.01 topiramate (TOPAMAX) 50 MG tablet      2. Mild persistent asthma with exacerbation  J45.31 albuterol (PROVENTIL) (2.5 MG/3ML) 0.083% neb solution     budesonide-formoterol (SYMBICORT) 160-4.5 MCG/ACT Inhaler      3. Moderate persistent asthma, unspecified whether complicated  J45.40 cetirizine (ZYRTEC) 10 MG tablet     VENTOLIN  (90 Base) MCG/ACT inhaler      4. Seasonal allergic rhinitis due to pollen  J30.1 cetirizine (ZYRTEC) 10 MG tablet      5. Screening for STDs (sexually transmitted diseases)  Z11.3 Chlamydia trachomatis/Neisseria gonorrhoeae by PCR- VAGINAL SELF-SWAB     Chlamydia trachomatis/Neisseria gonorrhoeae by PCR- VAGINAL SELF-SWAB      6. Gastroesophageal reflux disease with esophagitis without hemorrhage  K21.00 omeprazole (PRILOSEC) 40 MG DR capsule        1. Acid reflux.  Acid reflux happens more with certain foods in certain volumes and sometimes what she is eating and when she is eating makes a difference. She was recommended to avoid acidic foods, chocolate, mint, and caffeine late in the day. She was recommended to eat smaller meals later in the day. She was recommended to take Prilosec to help reduce the acid.    2. Weight management.  Her BMI is at a level where it has been fairly stable on our visits. I will give her a prescription for Topamax 1 tablet twice a day.  Goal weight 150.  If she is tolerating it, we will recheck her weight in 1 to 2 months.  This should help some of her not hunger eating.    3. Asthma.  She was recommended to take Symbicort every day, Ventolin as needed, and Zyrtec for allergies.    4. Health maintenance.  She is due for her annual. I will do screening for chlamydia.    Follow-up  The patient will follow up in 1 to 2 months.      No LOS data to display   Time spent by me doing chart review, history and exam, documentation and further activities per the note     MED REC REQUIRED  Post  "Medication Reconciliation Status: discharge medications reconciled and changed, per note/orders  BMI:   Estimated body mass index is 39.14 kg/m  as calculated from the following:    Height as of this encounter: 1.604 m (5' 3.15\").    Weight as of this encounter: 100.7 kg (222 lb).   Weight management plan: Discussed healthy diet and exercise guidelines        Debbie Lewis MD, MD  [facility] REGLA Romero is a 23 year old, presenting for the following health issues:  ER F/U and Asthma Recheck (Refill asthma medications)        11/15/2023     3:18 PM   Additional Questions   Roomed by Chelsi   Accompanied by Self         11/15/2023     3:18 PM   Patient Reported Additional Medications   Patient reports taking the following new medications none       HPI     ED/UC Followup:    Facility:  [facility]  Date of visit: 10/01/2023  Reason for visit: Abdominal pain  Current Status: Doesn't have any abdominal pain/feels better, no acid reflux.      Asthma Follow-Up/Patient is wheezing today    Was ACT completed today?  Yes        11/15/2023     3:13 PM   ACT Total Scores   ACT TOTAL SCORE (Goal Greater than or Equal to 20) 17   In the past 12 months, how many times did you visit the emergency room for your asthma without being admitted to the hospital? 1   In the past 12 months, how many times were you hospitalized overnight because of your asthma? 0       How many days per week do you miss taking your asthma controller medication?  0  Please describe any recent triggers for your asthma: dust mites, pollens, mold, humidity, strong odors and fumes, and cold air  Have you had any Emergency Room Visits, Urgent Care Visits, or Hospital Admissions since your last office visit?  No    The patient presents for evaluation of multiple medical concerns.    She was seen in the ER/hospital for abdominal pain and was told she had a severe episode of acid reflux. She had severe abdominal pain, " "bloating, and vomiting. Her symptoms have improved, but she still has occasional acid reflux. She tries not to eat when she is not hungry though overeating causes her reflux and is still difficult sometimes. She tried an over-the-counter medication for acid reflux, but it gave her a severe migraine and a 24-hour fever, so she stopped taking it after 1 tab.    Her asthma has been bad. She can control it, but there are things where it is horrible. She has noticed that fragrances and pollen trigger her asthma. She has been using her albuterol a lot lately. She was prescribed Flovent in the past, but her insurance would not let her get it. She was not told about Symbicort in chart indicates difficulty with coverage on this as well.  PA's report need for d.a.w.  She is on Zyrtec for allergies.    Her apartment is wondering if she could have a note for an emotional support animal.  She does not have documented depression or anxiety but states that she can feel anxious at times.    Review of Systems   Constitutional, HEENT, cardiovascular, pulmonary, GI, , musculoskeletal, neuro, skin, endocrine and psych systems are negative, except as otherwise noted.      Objective    /78   Pulse 57   Temp 98.9  F (37.2  C) (Temporal)   Resp 18   Ht 1.604 m (5' 3.15\")   Wt 100.7 kg (222 lb)   LMP 11/08/2023   SpO2 98%   Breastfeeding No   BMI 39.14 kg/m    Body mass index is 39.14 kg/m .  Physical Exam   GENERAL: healthy, alert and no distress  EYES: Eyes grossly normal to inspection, PERRL and conjunctivae and sclerae normal  HENT: ear canals and TM's normal, nose and mouth without ulcers or lesions  NECK: no adenopathy, no asymmetry, masses, or scars and thyroid normal to palpation  RESP: Lungs were tight with reduced airflow  CV: regular rate and rhythm, normal S1 S2, no S3 or S4, no murmur, click or rub, no peripheral edema and peripheral pulses strong  ABDOMEN: soft, nontender, no hepatosplenomegaly, no masses and " bowel sounds normal  MS: no gross musculoskeletal defects noted, no edema  SKIN: no suspicious lesions or rashes  NEURO: Normal strength and tone, mentation intact and speech normal  PSYCH: mentation appears normal, affect normal/bright                      Answers submitted by the patient for this visit:  JOHN-7 (Submitted on 11/15/2023)  JOHN 7 TOTAL SCORE: 8

## 2023-11-15 NOTE — COMMUNITY RESOURCES LIST (ENGLISH)
11/15/2023   [facility]  N/A  For questions about this resource list or additional care needs, please contact your primary care clinic or care manager.  Phone: [phone]   Email: N/A   Address: [address] [place] [address]   Hours: N/A        Housing       Housing search assistance  1  [facility] -  & Public Health Distance: 13.74 miles      Phone/Virtual   [address] [place] [address]  Language: English  Hours: Mon - Fri 8:00 AM - 4:30 PM  Fees: Free   Phone: (603) [phone] Email: [email or web address]. Website: [email or web address]          Important Numbers & Websites       Emergency Services   911  City Services   311  Poison Control   (756) 645-8708  Suicide Prevention Lifeline   (723) 552-7378 (TALK)  Child Abuse Hotline   (533) 555-8814 (4-A-Child)  Sexual Assault Hotline   (943) 720-8153 (HOPE)  National Runaway Safeline   (588) 217-6270 (RUNAWAY)  All-Options Talkline   (118) 641-7981  Substance Abuse Referral   (999) 834-3848 (HELP)

## 2023-11-16 LAB
C TRACH DNA SPEC QL PROBE+SIG AMP: NEGATIVE
N GONORRHOEA DNA SPEC QL NAA+PROBE: NEGATIVE

## 2023-12-13 ENCOUNTER — OFFICE VISIT (OUTPATIENT)
Dept: FAMILY MEDICINE | Facility: OTHER | Age: 23
End: 2023-12-13
Payer: COMMERCIAL

## 2023-12-13 VITALS
WEIGHT: 220 LBS | HEART RATE: 93 BPM | OXYGEN SATURATION: 98 % | HEIGHT: 63 IN | SYSTOLIC BLOOD PRESSURE: 120 MMHG | TEMPERATURE: 98 F | BODY MASS INDEX: 38.98 KG/M2 | RESPIRATION RATE: 22 BRPM | DIASTOLIC BLOOD PRESSURE: 74 MMHG

## 2023-12-13 DIAGNOSIS — J45.40 MODERATE PERSISTENT ASTHMA, UNSPECIFIED WHETHER COMPLICATED: ICD-10-CM

## 2023-12-13 DIAGNOSIS — E66.01 MORBID OBESITY (H): ICD-10-CM

## 2023-12-13 DIAGNOSIS — J06.9 UPPER RESPIRATORY TRACT INFECTION, UNSPECIFIED TYPE: Primary | ICD-10-CM

## 2023-12-13 PROCEDURE — 99213 OFFICE O/P EST LOW 20 MIN: CPT | Performed by: FAMILY MEDICINE

## 2023-12-13 RX ORDER — TOPIRAMATE 100 MG/1
100 TABLET, FILM COATED ORAL 2 TIMES DAILY
Qty: 60 TABLET | Refills: 1 | Status: SHIPPED | OUTPATIENT
Start: 2023-12-13 | End: 2024-01-24

## 2023-12-13 RX ORDER — PREDNISONE 20 MG/1
20 TABLET ORAL DAILY
Qty: 5 TABLET | Refills: 0 | Status: SHIPPED | OUTPATIENT
Start: 2023-12-13 | End: 2024-01-24

## 2023-12-13 RX ORDER — ALBUTEROL SULFATE 90 UG/1
2 AEROSOL, METERED RESPIRATORY (INHALATION) EVERY 6 HOURS PRN
Qty: 36 G | Refills: 1 | Status: SHIPPED | OUTPATIENT
Start: 2023-12-13 | End: 2024-04-10

## 2023-12-13 ASSESSMENT — PAIN SCALES - GENERAL: PAINLEVEL: NO PAIN (0)

## 2023-12-13 NOTE — PROGRESS NOTES
Assessment & Plan         ICD-10-CM    1. Upper respiratory tract infection, unspecified type  J06.9       2. Morbid obesity (H)  E66.01 topiramate (TOPAMAX) 100 MG tablet      3. Moderate persistent asthma, unspecified whether complicated  J45.40 VENTOLIN  (90 Base) MCG/ACT inhaler     predniSONE (DELTASONE) 20 MG tablet          Patient was here for her weight loss medication which has had some success but not as great as she was hoping for us we did increase the dosing for her to use and see if she can find better success in 1 to 2 months.  She was also noted to be sick today and having an asthma exacerbation and having poor airflow movement.  She was having difficulty with the expense for Ventolin and has not been having this for access.  We did talk about potentially using Symbicort for as needed though this is expensive and she does not have any better access for this if she does run out.  So we did look for coupons and did find through [employer] that she get her Ventolin fairly cheaply at [facility] and we gave her information about this as well as a printout for her Ventolin to be able to catch up.  Lastly we are giving her a prednisone burst to try and help get on top of this that she has gotten quite behind in this asthma exacerbation.  She should follow-up for asthma follow-up and an ACT in 1 to 2 months as well    I spent a total of 13 minutes on the day of the visit.   Time spent by me doing chart review, history and exam, documentation and further activities per the note    MD Debbie Funes MD, MD  [facility] REGLA Romero is a 23 year old, presenting for the following health issues:  Weight Check        12/13/2023     3:42 PM   Additional Questions   Roomed by tori   Accompanied by alone         12/13/2023     3:42 PM   Patient Reported Additional Medications   Patient reports taking the following new medications none       History of  "Present Illness       Reason for visit:  Medicen check up    She eats 0-1 servings of fruits and vegetables daily.She consumes 2 sweetened beverage(s) daily.She exercises with enough effort to increase her heart rate 9 or less minutes per day.  She exercises with enough effort to increase her heart rate 3 or less days per week. She is missing 1 dose(s) of medications per week.         Acute Illness  Acute illness concerns: chest congestion, sinus, runny nose, sob  Onset/Duration: 5days  Symptoms:  Fever: No  Chills/Sweats: No  Headache (location?): YES  Sinus Pressure: YES  Conjunctivitis:  No  Ear Pain: YES: bilateral  Rhinorrhea: YES  Congestion: YES  Sore Throat: No  Cough: YES  Wheeze: YES  Decreased Appetite: YES  Nausea: YES  Vomiting: YES  Diarrhea: No  Dysuria/Freq.: YES  Dysuria or Hematuria: YES  Fatigue/Achiness: YES  Sick/Strep Exposure: No  Therapies tried and outcome: cough drops, nyquil         Review of Systems   Constitutional, HEENT, cardiovascular, pulmonary, GI, , musculoskeletal, neuro, skin, endocrine and psych systems are negative, except as otherwise noted.      Objective    /74   Pulse 93   Temp 98  F (36.7  C) (Temporal)   Resp 22   Ht 1.607 m (5' 3.27\")   Wt 99.8 kg (220 lb)   LMP 12/04/2023   SpO2 98%   BMI 38.64 kg/m    Body mass index is 38.64 kg/m .  Physical Exam   GENERAL: healthy, alert and no distress  EYES: Eyes grossly normal to inspection, PERRL and conjunctivae and sclerae normal  HENT: ear canals and TM's normal, nose and mouth without ulcers or lesions  NECK: no adenopathy, no asymmetry, masses, or scars and thyroid normal to palpation  RESP: Tight with decreased airflow throughout.  CV: regular rate and rhythm, normal S1 S2, no S3 or S4, no murmur, click or rub, no peripheral edema and peripheral pulses strong  ABDOMEN: soft, nontender, no hepatosplenomegaly, no masses and bowel sounds normal  MS: no gross musculoskeletal defects noted, no edema  SKIN: no " suspicious lesions or rashes  NEURO: Normal strength and tone, mentation intact and speech normal  PSYCH: mentation appears normal, affect normal/bright

## 2023-12-21 ENCOUNTER — E-VISIT (OUTPATIENT)
Dept: URGENT CARE | Facility: CLINIC | Age: 23
End: 2023-12-21
Payer: COMMERCIAL

## 2023-12-21 DIAGNOSIS — B30.9 VIRAL CONJUNCTIVITIS: Primary | ICD-10-CM

## 2023-12-21 PROCEDURE — 99421 OL DIG E/M SVC 5-10 MIN: CPT | Performed by: PREVENTIVE MEDICINE

## 2023-12-21 RX ORDER — KETOTIFEN FUMARATE 0.35 MG/ML
SOLUTION/ DROPS OPHTHALMIC
Qty: 5 ML | Refills: 0 | Status: SHIPPED | OUTPATIENT
Start: 2023-12-21 | End: 2024-01-24

## 2023-12-21 NOTE — PATIENT INSTRUCTIONS
Thank you for choosing us for your care. I have placed an order for a prescription so that you can start treatment. View your full visit summary for details by clicking on the link below. Your pharmacist will able to address any questions you may have about the medication.     If you re not feeling better within 2-3 days, please schedule an appointment.  You can schedule an appointment right here in [facility], or call [phone]  If the visit is for the same symptoms as your eVisit, we ll refund the cost of your eVisit if seen within seven days.    Pinkeye: Care Instructions  Overview     Pinkeye is redness and swelling of the eye surface and the conjunctiva (the lining of the eyelid and the covering of the white part of the eye). Pinkeye is also called conjunctivitis. Pinkeye is often caused by infection with bacteria or a virus. Dry air, allergies, smoke, and chemicals are other common causes.  Pinkeye often gets better on its own in 7 to 10 days. Antibiotics only help if the pinkeye is caused by bacteria. Pinkeye caused by infection spreads easily. If an allergy or chemical is causing pinkeye, it will not go away unless you can avoid whatever is causing it.  Follow-up care is a key part of your treatment and safety. Be sure to make and go to all appointments, and call your doctor if you are having problems. It's also a good idea to know your test results and keep a list of the medicines you take.  How can you care for yourself at home?  Wash your hands often. Always wash them before and after you treat pinkeye or touch your eyes or face.  Use moist cotton or a clean, wet cloth to remove crust. Wipe from the inside corner of the eye to the outside. Use a clean part of the cloth for each wipe.  Put cold or warm wet cloths on your eye a few times a day if the eye hurts.  Do not wear contact lenses or eye makeup until the pinkeye is gone. Throw away any eye makeup you were using when you got pinkeye. Clean your  "contacts and storage case. If you wear disposable contacts, use a new pair when your eye has cleared and it is safe to wear contacts again.  If the doctor gave you antibiotic ointment or eyedrops, use them as directed. Use the medicine for as long as instructed, even if your eye starts looking better soon. Keep the bottle tip clean, and do not let it touch the eye area.  To put in eyedrops or ointment:  Tilt your head back, and pull your lower eyelid down with one finger.  Drop or squirt the medicine inside the lower lid.  Close your eye for 30 to 60 seconds to let the drops or ointment move around.  Do not touch the ointment or dropper tip to your eyelashes or any other surface.  Do not share towels, pillows, or washcloths while you have pinkeye.  When should you call for help?   Call your doctor now or seek immediate medical care if:    You have pain in your eye, not just irritation on the surface.     You have a change in vision or loss of vision.     You have an increase in discharge from the eye.     Your eye has not started to improve or begins to get worse within 48 hours after you start using antibiotics.     Pinkeye lasts longer than 7 days.   Watch closely for changes in your health, and be sure to contact your doctor if you have any problems.  Where can you learn more?  Go to [email or web address]://[email or web address].net/patiented  Enter Y392 in the search box to learn more about \"Pinkeye: Care Instructions.\"  Current as of: June 6, 2023               Content Version: 13.8    [id number] [employer].   Care instructions adapted under license by your healthcare professional. If you have questions about a medical condition or this instruction, always ask your healthcare professional. [employer] disclaims any warranty or liability for your use of this information.      "

## 2024-01-15 ENCOUNTER — NURSE TRIAGE (OUTPATIENT)
Dept: FAMILY MEDICINE | Facility: OTHER | Age: 24
End: 2024-01-15
Payer: COMMERCIAL

## 2024-01-15 NOTE — TELEPHONE ENCOUNTER
S-(situation): pt has been feeling tingling and slight numbness in her hands and feet. Both left and right sides.     B-(background): this is on and off ongoing for about a month     A-(assessment): pt is not currently having symptoms this is a comes and go symptoms. Sometimes pt has the tingling in both feet other times tingling in both hands.  Denies SOB, chest pain, difficulty breathing. Denies headaches, dizziness or vision changes.     R-(recommendations): sending to provider to determine. Protocol is be seen in 3 days, pt scheduled 1/24/24      Reason for Disposition   Numbness or tingling in one or both hands is a chronic symptom (recurrent or ongoing problem lasting > 4 weeks)    Additional Information   Negative: Confusion, disorientation, or hallucinations is main symptom   Negative: Dizziness is main symptom   Negative: Followed a head injury within last 3 days   Negative: Difficult to awaken or acting confused (e.g., disoriented, slurred speech)   Negative: New neurologic deficit that is present NOW, sudden onset of ANY of the following: * Weakness of the face, arm, or leg on one side of the body* Numbness of the face, arm, or leg on one side of the body* Loss of speech or garbled speech   Negative: Sounds like a life-threatening emergency to the triager   Negative: SEVERE weakness (i.e., unable to walk or barely able to walk, requires support) and new-onset or worsening   Negative: Headache (with neurologic deficit)   Negative: Unable to urinate (or only a few drops) and bladder feels very full   Negative: Loss of bladder or bowel control (urine or bowel incontinence; wetting self, leaking stool) of new-onset   Negative: Back pain with numbness (loss of sensation) in groin or rectal area   Negative: Neurologic deficit that was brief (now gone), ANY of the following: * Weakness of the face, arm, or leg on one side of the body * Numbness of the face, arm, or leg on one side of the body * Loss of speech or  "garbled speech   Negative: Patient sounds very sick or weak to the triager   Negative: Neck pain (with neurologic deficit)   Negative: Back pain (with neurologic deficit)   Negative: Patient wants to be seen   Negative: Neurologic deficit of gradual onset (e.g., days to weeks), ANY of the following: * Weakness of the face, arm, or leg on one side of the body* Numbness of the face, arm, or leg on one side of the body* Loss of speech or garbled speech   Negative: [place] palsy suspected (i.e., weakness only one side of the face, developing over hours to days, no other symptoms)   Negative: Tingling (e.g., pins and needles) of the face, arm or leg on one side of the body, that is present now (Exceptions: Chronic or recurrent symptom lasting > 4 weeks; or tingling from known cause, such as: bumped elbow, carpal tunnel syndrome, pinched nerve, frostbite.)    Answer Assessment - Initial Assessment Questions  1. SYMPTOM: \"What is the main symptom you are concerned about?\" (e.g., weakness, numbness)      Tinging and numbness in both hands and feet   2. ONSET: \"When did this start?\" (minutes, hours, days; while sleeping)      A month ago   3. LAST NORMAL: \"When was the last time you (the patient) were normal (no symptoms)?\"      Two months ago   4. PATTERN \"Does this come and go, or has it been constant since it started?\"  \"Is it present now?\"      Comes and goes, not having symptoms now. Lat time wa last night   5. CARDIAC SYMPTOMS: \"Have you had any of the following symptoms: chest pain, difficulty breathing, palpitations?\"      Not that pt notices   6. NEUROLOGIC SYMPTOMS: \"Have you had any of the following symptoms: headache, dizziness, vision loss, double vision, changes in speech, unsteady on your feet?\"      None   7. OTHER SYMPTOMS: \"Do you have any other symptoms?\"      Dark urine even though pt is drinking plenty of water   8. PREGNANCY: \"Is there any chance you are pregnant?\" \"When was your last menstrual period?\"   "    None    Protocols used: Neurologic Deficit-A-OH

## 2024-01-15 NOTE — TELEPHONE ENCOUNTER
Notified patient of recommendations. Patient expressed understanding and will call back with any further concerns.  Vicky Palomino RN on 1/15/2024 at 12:19 PM

## 2024-01-15 NOTE — TELEPHONE ENCOUNTER
Patient has a visit with Joshua on 1/24 Tingling hands and feet is in the notes.    Suzanne Orozco MA

## 2024-01-24 ENCOUNTER — OFFICE VISIT (OUTPATIENT)
Dept: FAMILY MEDICINE | Facility: OTHER | Age: 24
End: 2024-01-24
Payer: COMMERCIAL

## 2024-01-24 VITALS
TEMPERATURE: 98 F | HEART RATE: 89 BPM | DIASTOLIC BLOOD PRESSURE: 68 MMHG | RESPIRATION RATE: 20 BRPM | WEIGHT: 226 LBS | SYSTOLIC BLOOD PRESSURE: 110 MMHG | OXYGEN SATURATION: 99 % | BODY MASS INDEX: 39.7 KG/M2

## 2024-01-24 DIAGNOSIS — R20.2 TINGLING: Primary | ICD-10-CM

## 2024-01-24 DIAGNOSIS — E66.01 MORBID OBESITY (H): ICD-10-CM

## 2024-01-24 LAB
ALBUMIN SERPL BCG-MCNC: 4.3 G/DL (ref 3.5–5.2)
ALP SERPL-CCNC: 72 U/L (ref 40–150)
ALT SERPL W P-5'-P-CCNC: 16 U/L (ref 0–50)
ANION GAP SERPL CALCULATED.3IONS-SCNC: 13 MMOL/L (ref 7–15)
AST SERPL W P-5'-P-CCNC: 21 U/L (ref 0–45)
BILIRUB SERPL-MCNC: <0.2 MG/DL
BUN SERPL-MCNC: 11.4 MG/DL (ref 6–20)
CALCIUM SERPL-MCNC: 9.2 MG/DL (ref 8.6–10)
CHLORIDE SERPL-SCNC: 108 MMOL/L (ref 98–107)
CHOLEST SERPL-MCNC: 165 MG/DL
CREAT SERPL-MCNC: 0.83 MG/DL (ref 0.51–0.95)
DEPRECATED HCO3 PLAS-SCNC: 20 MMOL/L (ref 22–29)
EGFRCR SERPLBLD CKD-EPI 2021: >90 ML/MIN/1.73M2
ERYTHROCYTE [DISTWIDTH] IN BLOOD BY AUTOMATED COUNT: 13.2 % (ref 10–15)
FASTING STATUS PATIENT QL REPORTED: NO
GLUCOSE SERPL-MCNC: 74 MG/DL (ref 70–99)
HBA1C MFR BLD: 5.4 % (ref 0–5.6)
HCT VFR BLD AUTO: 38.9 % (ref 35–47)
HDLC SERPL-MCNC: 60 MG/DL
HGB BLD-MCNC: 13.1 G/DL (ref 11.7–15.7)
LDLC SERPL CALC-MCNC: 70 MG/DL
MAGNESIUM SERPL-MCNC: 2 MG/DL (ref 1.7–2.3)
MCH RBC QN AUTO: 30 PG (ref 26.5–33)
MCHC RBC AUTO-ENTMCNC: 33.7 G/DL (ref 31.5–36.5)
MCV RBC AUTO: 89 FL (ref 78–100)
NONHDLC SERPL-MCNC: 105 MG/DL
PLATELET # BLD AUTO: 190 10E3/UL (ref 150–450)
POTASSIUM SERPL-SCNC: 4.2 MMOL/L (ref 3.4–5.3)
PROT SERPL-MCNC: 7.4 G/DL (ref 6.4–8.3)
RBC # BLD AUTO: 4.36 10E6/UL (ref 3.8–5.2)
SODIUM SERPL-SCNC: 141 MMOL/L (ref 135–145)
T4 FREE SERPL-MCNC: 1.01 NG/DL (ref 0.9–1.7)
TRIGL SERPL-MCNC: 175 MG/DL
TSH SERPL DL<=0.005 MIU/L-ACNC: 5.12 UIU/ML (ref 0.3–4.2)
WBC # BLD AUTO: 6.6 10E3/UL (ref 4–11)

## 2024-01-24 PROCEDURE — 84439 ASSAY OF FREE THYROXINE: CPT | Performed by: FAMILY MEDICINE

## 2024-01-24 PROCEDURE — 99213 OFFICE O/P EST LOW 20 MIN: CPT | Performed by: FAMILY MEDICINE

## 2024-01-24 PROCEDURE — 85027 COMPLETE CBC AUTOMATED: CPT | Performed by: FAMILY MEDICINE

## 2024-01-24 PROCEDURE — 86039 ANTINUCLEAR ANTIBODIES (ANA): CPT | Performed by: FAMILY MEDICINE

## 2024-01-24 PROCEDURE — 80053 COMPREHEN METABOLIC PANEL: CPT | Performed by: FAMILY MEDICINE

## 2024-01-24 PROCEDURE — 86038 ANTINUCLEAR ANTIBODIES: CPT | Performed by: FAMILY MEDICINE

## 2024-01-24 PROCEDURE — 83036 HEMOGLOBIN GLYCOSYLATED A1C: CPT | Performed by: FAMILY MEDICINE

## 2024-01-24 PROCEDURE — 82607 VITAMIN B-12: CPT | Performed by: FAMILY MEDICINE

## 2024-01-24 PROCEDURE — 83735 ASSAY OF MAGNESIUM: CPT | Performed by: FAMILY MEDICINE

## 2024-01-24 PROCEDURE — 84443 ASSAY THYROID STIM HORMONE: CPT | Performed by: FAMILY MEDICINE

## 2024-01-24 PROCEDURE — 36415 COLL VENOUS BLD VENIPUNCTURE: CPT | Performed by: FAMILY MEDICINE

## 2024-01-24 PROCEDURE — 80061 LIPID PANEL: CPT | Performed by: FAMILY MEDICINE

## 2024-01-24 RX ORDER — TOPIRAMATE 100 MG/1
100 TABLET, FILM COATED ORAL 2 TIMES DAILY
Qty: 60 TABLET | Refills: 1 | Status: SHIPPED | OUTPATIENT
Start: 2024-01-24 | End: 2024-06-26

## 2024-01-24 ASSESSMENT — PATIENT HEALTH QUESTIONNAIRE - PHQ9
SUM OF ALL RESPONSES TO PHQ QUESTIONS 1-9: 9
SUM OF ALL RESPONSES TO PHQ QUESTIONS 1-9: 9
10. IF YOU CHECKED OFF ANY PROBLEMS, HOW DIFFICULT HAVE THESE PROBLEMS MADE IT FOR YOU TO DO YOUR WORK, TAKE CARE OF THINGS AT HOME, OR GET ALONG WITH OTHER PEOPLE: SOMEWHAT DIFFICULT

## 2024-01-24 ASSESSMENT — ASTHMA QUESTIONNAIRES
ACT_TOTALSCORE: 19
QUESTION_1 LAST FOUR WEEKS HOW MUCH OF THE TIME DID YOUR ASTHMA KEEP YOU FROM GETTING AS MUCH DONE AT WORK, SCHOOL OR AT HOME: NONE OF THE TIME
QUESTION_4 LAST FOUR WEEKS HOW OFTEN HAVE YOU USED YOUR RESCUE INHALER OR NEBULIZER MEDICATION (SUCH AS ALBUTEROL): ONCE A WEEK OR LESS
QUESTION_2 LAST FOUR WEEKS HOW OFTEN HAVE YOU HAD SHORTNESS OF BREATH: ONCE A DAY
QUESTION_3 LAST FOUR WEEKS HOW OFTEN DID YOUR ASTHMA SYMPTOMS (WHEEZING, COUGHING, SHORTNESS OF BREATH, CHEST TIGHTNESS OR PAIN) WAKE YOU UP AT NIGHT OR EARLIER THAN USUAL IN THE MORNING: ONCE OR TWICE
QUESTION_5 LAST FOUR WEEKS HOW WOULD YOU RATE YOUR ASTHMA CONTROL: WELL CONTROLLED
ACT_TOTALSCORE: 19

## 2024-01-24 ASSESSMENT — PAIN SCALES - GENERAL: PAINLEVEL: NO PAIN (0)

## 2024-01-24 NOTE — PROGRESS NOTES
Assessment & Plan         ICD-10-CM    1. Tingling  R20.2 CBC with platelets     TSH with free T4 reflex     Comprehensive metabolic panel (BMP + Alb, Alk Phos, ALT, AST, Total. Bili, TP)     Magnesium     Vitamin B12     Anti Nuclear Alissa IgG by IFA with Reflex     Hemoglobin A1c     CBC with platelets     TSH with free T4 reflex     Comprehensive metabolic panel (BMP + Alb, Alk Phos, ALT, AST, Total. Bili, TP)     Magnesium     Vitamin B12     Anti Nuclear Alissa IgG by IFA with Reflex     Hemoglobin A1c      2. Morbid obesity (H)  E66.01 Lipid panel reflex to direct LDL Non-fasting     Lipid panel reflex to direct LDL Non-fasting     topiramate (TOPAMAX) 100 MG tablet          Discussed that her tingling is occurring both in hands and feet and is intermittent which makes this less likely to be any particular compressive area but more likely a systemic concern.  Diabetes is on the highest risk factors though we did get other labs for other considerations.  Ultimately we did discuss if we cannot find the source for this that anxiety is a potential.  She may want to consider evaluating him for this and treating the separately if there is no other sources for her symptoms.    I spent a total of 11 minutes on the day of the visit.   Time spent by me doing chart review, history and exam, documentation and further activities per the note    Debbie Lewis MD       Subjective   Heather is a 23 year old, presenting for the following health issues:  Tingling        1/24/2024     4:53 PM   Additional Questions   Roomed by tori   Accompanied by alone         1/24/2024     4:53 PM   Patient Reported Additional Medications   Patient reports taking the following new medications none     History of Present Illness       Reason for visit:  Having tingling feelings in hands and feet every so often  Symptom onset:  More than a month  Symptoms include:  Tingling  Symptom intensity:  Mild  Symptom progression:  Staying the same  Had these  symptoms before:  No  What makes it worse:  Not that i know of  What makes it better:  No    She eats 0-1 servings of fruits and vegetables daily.She consumes 2 sweetened beverage(s) daily.She exercises with enough effort to increase her heart rate 9 or less minutes per day.  She exercises with enough effort to increase her heart rate 3 or less days per week. She is missing 1 dose(s) of medications per week.  She is not taking prescribed medications regularly due to remembering to take.     Wants ears also looked at                    Objective    /68   Pulse 89   Temp 98  F (36.7  C) (Temporal)   Resp 20   Wt 102.5 kg (226 lb)   LMP 12/31/2023 (Exact Date)   SpO2 99%   BMI 39.70 kg/m    Body mass index is 39.7 kg/m .  Physical Exam   GENERAL: alert and no distress  EYES: Eyes grossly normal to inspection, PERRL and conjunctivae and sclerae normal  HENT: ear canals and TM's normal, nose and mouth without ulcers or lesions  NECK: no adenopathy, no asymmetry, masses, or scars  RESP: lungs clear to auscultation - no rales, rhonchi or wheezes  CV: regular rate and rhythm, normal S1 S2, no S3 or S4, no murmur, click or rub, no peripheral edema  ABDOMEN: soft, nontender, no hepatosplenomegaly, no masses and bowel sounds normal  MS: no gross musculoskeletal defects noted, no edema.  Evaluation of wrist elbow and neck did not find any areas that exacerbated her tingling sensation.  SKIN: no suspicious lesions or rashes  NEURO: Normal strength and tone, mentation intact and speech normal  PSYCH: mentation appears normal, affect normal/bright  LYMPH: no cervical, supraclavicular, axillary, or inguinal adenopathy            Signed Electronically by: Debbie Lewis MD, MD

## 2024-01-24 NOTE — COMMUNITY RESOURCES LIST (ENGLISH)
01/24/2024   [facility]  N/A  For questions about this resource list or additional care needs, please contact your primary care clinic or care manager.  Phone: [phone]   Email: N/A   Address: [address] [place] [id number]   Hours: N/A        Housing       Housing search assistance  1  [facility] -  & Public Health Distance: 13.74 miles      Phone/Virtual   [address] [place] [address]  Language: English  Hours: Mon - Fri 8:00 AM - 4:30 PM  Fees: Free   Phone: (562) 455-2734 Email: [email or web address]. Website: [email or web address]/Health-[email or web address]-Services          Important Numbers & Websites       Emergency Services   911  City Services   311  Poison Control   (713) 258-7643  Suicide Prevention Lifeline   (173) 366-6775 (TALK)  Child Abuse Hotline   (374) 556-9577 (4-A-Child)  Sexual Assault Hotline   (850) 303-8246 (HOPE)  National Runaway Safeline   (338) 142-9920 (RUNAWAY)  All-Options Talkline   (992) 170-8158  Substance Abuse Referral   (424) 829-7968 (HELP)

## 2024-01-25 LAB — VIT B12 SERPL-MCNC: 170 PG/ML (ref 232–1245)

## 2024-01-26 LAB
ANA PAT SER IF-IMP: ABNORMAL
ANA SER QL IF: ABNORMAL
ANA TITR SER IF: ABNORMAL {TITER}

## 2024-03-03 DIAGNOSIS — K21.00 GASTROESOPHAGEAL REFLUX DISEASE WITH ESOPHAGITIS WITHOUT HEMORRHAGE: ICD-10-CM

## 2024-03-04 RX ORDER — OMEPRAZOLE 40 MG/1
40 CAPSULE, DELAYED RELEASE ORAL DAILY
Qty: 90 CAPSULE | Refills: 3 | Status: SHIPPED | OUTPATIENT
Start: 2024-03-04 | End: 2024-06-26

## 2024-03-09 ENCOUNTER — E-VISIT (OUTPATIENT)
Dept: FAMILY MEDICINE | Facility: OTHER | Age: 24
End: 2024-03-09
Payer: COMMERCIAL

## 2024-03-09 DIAGNOSIS — G43.009 MIGRAINE WITHOUT AURA AND WITHOUT STATUS MIGRAINOSUS, NOT INTRACTABLE: Primary | ICD-10-CM

## 2024-03-09 PROCEDURE — 99422 OL DIG E/M SVC 11-20 MIN: CPT | Performed by: FAMILY MEDICINE

## 2024-03-10 RX ORDER — SUMATRIPTAN 50 MG/1
50 TABLET, FILM COATED ORAL
Qty: 30 TABLET | Refills: 1 | Status: SHIPPED | OUTPATIENT
Start: 2024-03-10

## 2024-04-10 ENCOUNTER — OFFICE VISIT (OUTPATIENT)
Dept: FAMILY MEDICINE | Facility: OTHER | Age: 24
End: 2024-04-10
Payer: COMMERCIAL

## 2024-04-10 VITALS
TEMPERATURE: 98.5 F | SYSTOLIC BLOOD PRESSURE: 122 MMHG | DIASTOLIC BLOOD PRESSURE: 78 MMHG | HEART RATE: 90 BPM | OXYGEN SATURATION: 99 % | WEIGHT: 226 LBS | BODY MASS INDEX: 40.04 KG/M2 | RESPIRATION RATE: 18 BRPM | HEIGHT: 63 IN

## 2024-04-10 DIAGNOSIS — J45.31 MILD PERSISTENT ASTHMA WITH EXACERBATION: ICD-10-CM

## 2024-04-10 DIAGNOSIS — Z00.00 ROUTINE GENERAL MEDICAL EXAMINATION AT A HEALTH CARE FACILITY: Primary | ICD-10-CM

## 2024-04-10 DIAGNOSIS — J45.40 MODERATE PERSISTENT ASTHMA, UNSPECIFIED WHETHER COMPLICATED: ICD-10-CM

## 2024-04-10 DIAGNOSIS — F41.1 GAD (GENERALIZED ANXIETY DISORDER): ICD-10-CM

## 2024-04-10 DIAGNOSIS — E53.8 VITAMIN B12 DEFICIENCY (NON ANEMIC): ICD-10-CM

## 2024-04-10 DIAGNOSIS — R63.5 RAPID WEIGHT GAIN: ICD-10-CM

## 2024-04-10 DIAGNOSIS — E66.01 MORBID OBESITY (H): ICD-10-CM

## 2024-04-10 DIAGNOSIS — J30.1 SEASONAL ALLERGIC RHINITIS DUE TO POLLEN: ICD-10-CM

## 2024-04-10 DIAGNOSIS — N81.89 PELVIC FLOOR RELAXATION: ICD-10-CM

## 2024-04-10 DIAGNOSIS — G47.09 OTHER INSOMNIA: ICD-10-CM

## 2024-04-10 PROCEDURE — 36415 COLL VENOUS BLD VENIPUNCTURE: CPT | Performed by: FAMILY MEDICINE

## 2024-04-10 PROCEDURE — 99214 OFFICE O/P EST MOD 30 MIN: CPT | Mod: 25 | Performed by: FAMILY MEDICINE

## 2024-04-10 PROCEDURE — 82607 VITAMIN B-12: CPT | Performed by: FAMILY MEDICINE

## 2024-04-10 PROCEDURE — 99395 PREV VISIT EST AGE 18-39: CPT | Performed by: FAMILY MEDICINE

## 2024-04-10 RX ORDER — PHENTERMINE HYDROCHLORIDE 30 MG/1
30 CAPSULE ORAL EVERY MORNING
Qty: 30 CAPSULE | Refills: 0 | Status: SHIPPED | OUTPATIENT
Start: 2024-04-10 | End: 2024-06-26

## 2024-04-10 RX ORDER — ALBUTEROL SULFATE 90 UG/1
2 AEROSOL, METERED RESPIRATORY (INHALATION) EVERY 4 HOURS PRN
Qty: 18 G | Refills: 0 | Status: SHIPPED | OUTPATIENT
Start: 2024-04-10

## 2024-04-10 RX ORDER — NORTRIPTYLINE HCL 25 MG
25 CAPSULE ORAL AT BEDTIME
Qty: 90 CAPSULE | Refills: 1 | Status: SHIPPED | OUTPATIENT
Start: 2024-04-10 | End: 2024-07-17

## 2024-04-10 RX ORDER — CETIRIZINE HYDROCHLORIDE 10 MG/1
10 TABLET ORAL DAILY
Qty: 90 TABLET | Refills: 1 | Status: SHIPPED | OUTPATIENT
Start: 2024-04-10

## 2024-04-10 RX ORDER — FLUTICASONE PROPIONATE 220 UG/1
1 AEROSOL, METERED RESPIRATORY (INHALATION) 2 TIMES DAILY
Qty: 12 G | Refills: 1 | Status: SHIPPED | OUTPATIENT
Start: 2024-04-10 | End: 2024-05-08

## 2024-04-10 RX ORDER — LEVONORGESTREL/ETHIN.ESTRADIOL 0.1-0.02MG
1 TABLET ORAL DAILY
Qty: 84 TABLET | Status: SHIPPED | OUTPATIENT
Start: 2024-04-10

## 2024-04-10 SDOH — HEALTH STABILITY: PHYSICAL HEALTH: ON AVERAGE, HOW MANY MINUTES DO YOU ENGAGE IN EXERCISE AT THIS LEVEL?: PATIENT DECLINED

## 2024-04-10 SDOH — HEALTH STABILITY: PHYSICAL HEALTH: ON AVERAGE, HOW MANY DAYS PER WEEK DO YOU ENGAGE IN MODERATE TO STRENUOUS EXERCISE (LIKE A BRISK WALK)?: 0 DAYS

## 2024-04-10 ASSESSMENT — ASTHMA QUESTIONNAIRES
QUESTION_1 LAST FOUR WEEKS HOW MUCH OF THE TIME DID YOUR ASTHMA KEEP YOU FROM GETTING AS MUCH DONE AT WORK, SCHOOL OR AT HOME: SOME OF THE TIME
QUESTION_4 LAST FOUR WEEKS HOW OFTEN HAVE YOU USED YOUR RESCUE INHALER OR NEBULIZER MEDICATION (SUCH AS ALBUTEROL): NOT AT ALL
QUESTION_3 LAST FOUR WEEKS HOW OFTEN DID YOUR ASTHMA SYMPTOMS (WHEEZING, COUGHING, SHORTNESS OF BREATH, CHEST TIGHTNESS OR PAIN) WAKE YOU UP AT NIGHT OR EARLIER THAN USUAL IN THE MORNING: ONCE OR TWICE
ACT_TOTALSCORE: 20
QUESTION_5 LAST FOUR WEEKS HOW WOULD YOU RATE YOUR ASTHMA CONTROL: WELL CONTROLLED
EMERGENCY_ROOM_LAST_YEAR_TOTAL: ONE
ACT_TOTALSCORE: 20
QUESTION_2 LAST FOUR WEEKS HOW OFTEN HAVE YOU HAD SHORTNESS OF BREATH: ONCE OR TWICE A WEEK

## 2024-04-10 ASSESSMENT — SOCIAL DETERMINANTS OF HEALTH (SDOH): HOW OFTEN DO YOU GET TOGETHER WITH FRIENDS OR RELATIVES?: ONCE A WEEK

## 2024-04-10 NOTE — COMMUNITY RESOURCES LIST (ENGLISH)
April 10, 2024           YOUR PERSONALIZED LIST OF SERVICES & PROGRAMS           & SHELTER    Housing      [facility] - Housing Stabilization Services  Phone: (147) 413-7074  Website: https://IES/Housing-Stabilization.html  Language: English  Hours: Mon 9:00 AM - 5:00 PM Tue 9:00 AM - 5:00 PM Wed 9:00 AM - 5:00 PM Thu 9:00 AM - 5:00 PM Fri 9:00 AM - 5:00 PM  Fee: Insurance  Accessibility: Deaf or hard of hearing, Translation services    [facility] - Housing search assistance  [address] [place] [address] (Distance: 13.8 miles)  Phone: [phone]) [phone]  Language: English  Fee: Free  Accessibility: [place] accessible, Translation services      [facility] - Housing Stabilization Services (HSS)  Phone: (891) 237-5259  Website: https://www.[employer].wise.io/resources  Language: English, Cameroonian  Hours: Mon 8:00 AM - 4:00 PM Tue 8:00 AM - 4:00 PM Wed 8:00 AM - 4:00 PM Thu 8:00 AM - 4:00 PM Fri 8:00 AM - 4:00 PM  Fee: Free, Insurance  Accessibility: [place] accessible, Blind accommodation, Deaf or hard of hearing, Translation services      [facility] - Housing Stabilization Services  Phone: (774) 826-3581  Website: https://homebasemn.com/  Language: English  Hours: Mon 8:00 AM - 4:00 PM Tue 8:00 AM - 4:00 PM Wed 8:00 AM - 4:00 PM Thu 8:00 AM - 4:00 PM Fri 8:00 AM - 4:00 PM  Fee: Free  Accessibility: Blind accommodation, Deaf or hard of hearing  Transportation Options: Free transportation    Drop-In Services      [facility] Library - Warming or cooling center - [facility] - [facility]  [address] [facility] [place] [address] (Distance: 18.8 miles)  Language: English  Fee: Free      Library System - Warming or cooling center  [address] [place] [address] (Distance: 19.1 miles)  Language: English  Fee: Free      LOVE - LAUNDRY LOVE  Website: http://www.laundrylove.org        &  RECREATION    Sports      [employer] - Therapeutic Horseback Riding  [address] [place] [address] (Distance: 20.1 miles)  Website: http://www.[employer]  Language: English  Fee: Self pay  Accessibility: [place] accessible      [facility] - Open gym and basketball courts  [address] [place] [address] (Distance: 20.1 miles)  Phone: (135) [phone]  Language: English  Fee: Self pay  Accessibility: [place] accessible      LEAGUE - LITTLE LEAGUE BASEBALL AND SOFTBALL  Website: http://www.[employer].org    Classes/Groups      [facility] - Adult Enrichment  Phone: (658) 249-1258  Website: https://[employer]/adults-seniors/adult-enrichment/  Language: English  Hours: Mon 7:30 AM - 4:00 PM Tue 7:30 AM - 4:00 PM Wed 7:30 AM - 4:00 PM Thu 7:30 AM - 4:00 PM Fri 7:30 AM - 4:00 PM               IMPORTANT NUMBERS & WEBSITES        Emergency Services  911  .   [facility]  211 http://211unitedway.org  .   Poison Control  (478) 703-5012 http://mnpoison.org http://wisconsinpoison.org  .     Suicide and Crisis Lifeline  988 http://988lifeline.org  .   Childhelp National Child Abuse Hotline  258.784.8132 http://Childhelphotline.org   .   National Sexual Assault Hotline  (648) 280-9138 (HOPE) http://Rainn.org   .     National Runaway Safeline  (960) 957-2435 (RUNAWAY) http://1800runaway.org  .   Pregnancy & Postpartum Support  Call/text 984-155-5574  [place]: http://ppsupportmn.org  [place]: http://Kmsocial.com/wi  .   Substance Abuse National Helpline ([facility])  386-189-HELP (0195) http://Findtreatment.gov   .                DISCLAIMER: These resources have been generated via the [employer]. [employer] does not endorse any service providers mentioned in this resource list. [employer] does not guarantee that the services mentioned in this resource list will be available to you or will improve your health or wellness.    [facility]

## 2024-04-10 NOTE — PATIENT INSTRUCTIONS
Preventive Care Advice   This is general advice given by our system to help you stay healthy. However, your care team may have specific advice just for you. Please talk to your care team about your preventive care needs.  Nutrition  Eat 5 or more servings of fruits and vegetables each day.  Try wheat bread, brown rice and whole grain pasta (instead of white bread, rice, and pasta).  Get enough calcium and vitamin D. Check the label on foods and aim for 100% of the RDA (recommended daily allowance).  Lifestyle  Exercise at least 150 minutes each week   (30 minutes a day, 5 days a week).  Do muscle strengthening activities 2 days a week. These help control your weight and prevent disease.  No smoking.  Wear sunscreen to prevent skin cancer.  Have a dental exam and cleaning every 6 months.  Yearly exams  See your health care team every year to talk about:  Any changes in your health.  Any medicines your care team has prescribed.  Preventive care, family planning, and ways to prevent chronic diseases.  Shots (vaccines)   HPV shots (up to age 26), if you've never had them before.  Hepatitis B shots (up to age 59), if you've never had them before.  COVID-19 shot: Get this shot when it's due.  Flu shot: Get a flu shot every year.  Tetanus shot: Get a tetanus shot every 10 years.  Pneumococcal, hepatitis A, and RSV shots: Ask your care team if you need these based on your risk.  Shingles shot (for age 50 and up).  General health tests  Diabetes screening:  Starting at age 35, Get screened for diabetes at least every 3 years.  If you are younger than age 35, ask your care team if you should be screened for diabetes.  Cholesterol test: At age 39, start having a cholesterol test every 5 years, or more often if advised.  Bone density scan (DEXA): At age 50, ask your care team if you should have this scan for osteoporosis (brittle bones).  Hepatitis C: Get tested at least once in your life.  STIs (sexually transmitted  infections)  Before age 24: Ask your care team if you should be screened for STIs.  After age 24: Get screened for STIs if you're at risk. You are at risk for STIs (including HIV) if:  You are sexually active with more than one person.  You don't use condoms every time.  You or a partner was diagnosed with a sexually transmitted infection.  If you are at risk for HIV, ask about PrEP medicine to prevent HIV.  Get tested for HIV at least once in your life, whether you are at risk for HIV or not.  Cancer screening tests  Cervical cancer screening: If you have a cervix, begin getting regular cervical cancer screening tests at age 21. Most people who have regular screenings with normal results can stop after age 65. Talk about this with your provider.  Breast cancer scan (mammogram): If you've ever had breasts, begin having regular mammograms starting at age 40. This is a scan to check for breast cancer.  Colon cancer screening: It is important to start screening for colon cancer at age 45.  Have a colonoscopy test every 10 years (or more often if you're at risk) Or, ask your provider about stool tests like a FIT test every year or Cologuard test every 3 years.  To learn more about your testing options, visit: https://www.[employer]elligent[employer]/379309.pdf.  For help making a decision, visit: https://bit.ly/zp09288.  Prostate cancer screening test: If you have a prostate and are age 55 to 69, ask your provider if you would benefit from a yearly prostate cancer screening test.  Lung cancer screening: If you are a current or former smoker age 50 to 80, ask your care team if ongoing lung cancer screenings are right for you.  For informational purposes only. Not to replace the advice of your health care provider. Copyright   2023 [employer]. All rights reserved. Clinically reviewed by the [facility]. [employer] [id number] - REV 01/24.    Learning About Stress  What is stress?     Stress is your  body's response to a hard situation. Your body can have a physical, emotional, or mental response. Stress is a fact of life for most people, and it affects everyone differently. What causes stress for you may not be stressful for someone else.  A lot of things can cause stress. You may feel stress when you go on a job interview, take a test, or run a race. This kind of short-term stress is normal and even useful. It can help you if you need to work hard or react quickly. For example, stress can help you finish an important job on time.  Long-term stress is caused by ongoing stressful situations or events. Examples of long-term stress include long-term health problems, ongoing problems at work, or conflicts in your family. Long-term stress can harm your health.  How does stress affect your health?  When you are stressed, your body responds as though you are in danger. It makes hormones that speed up your heart, make you breathe faster, and give you a burst of energy. This is called the fight-or-flight stress response. If the stress is over quickly, your body goes back to normal and no harm is done.  But if stress happens too often or lasts too long, it can have bad effects. Long-term stress can make you more likely to get sick, and it can make symptoms of some diseases worse. If you tense up when you are stressed, you may develop neck, shoulder, or low back pain. Stress is linked to high blood pressure and heart disease.  Stress also harms your emotional health. It can make you dunne, tense, or depressed. Your relationships may suffer, and you may not do well at work or school.  What can you do to manage stress?  You can try these things to help manage stress:   Do something active. Exercise or activity can help reduce stress. Walking is a great way to get started. Even everyday activities such as housecleaning or yard work can help.  Try yoga or osmany chi. These techniques combine exercise and meditation. You may need  some training at first to learn them.  Do something you enjoy. For example, listen to music or go to a movie. Practice your hobby or do volunteer work.  Meditate. This can help you relax, because you are not worrying about what happened before or what may happen in the future.  Do guided imagery. Imagine yourself in any setting that helps you feel calm. You can use online videos, books, or a teacher to guide you.  Do breathing exercises. For example:  From a standing position, bend forward from the waist with your knees slightly bent. Let your arms dangle close to the floor.  Breathe in slowly and deeply as you return to a standing position. Roll up slowly and lift your head last.  Hold your breath for just a few seconds in the standing position.  Breathe out slowly and bend forward from the waist.  Let your feelings out. Talk, laugh, cry, and express anger when you need to. Talking with supportive friends or family, a counselor, or a alvina leader about your feelings is a healthy way to relieve stress. Avoid discussing your feelings with people who make you feel worse.  Write. It may help to write about things that are bothering you. This helps you find out how much stress you feel and what is causing it. When you know this, you can find better ways to cope.  What can you do to prevent stress?  You might try some of these things to help prevent stress:  Manage your time. This helps you find time to do the things you want and need to do.  Get enough sleep. Your body recovers from the stresses of the day while you are sleeping.  Get support. Your family, friends, and community can make a difference in how you experience stress.  Limit your news feed. Avoid or limit time on social media or news that may make you feel stressed.  Do something active. Exercise or activity can help reduce stress. Walking is a great way to get started.  Where can you learn more?  Go to https://www.healthwise.net/patiented  Enter N032 in the  "search box to learn more about \"Learning About Stress.\"  Current as of: October 24, 2023               Content Version: 14.0    0381-8255 [employer].   Care instructions adapted under license by your healthcare professional. If you have questions about a medical condition or this instruction, always ask your healthcare professional. [employer] disclaims any warranty or liability for your use of this information.      "

## 2024-04-10 NOTE — PROGRESS NOTES
Preventive Care Visit  [facility]  Debbie Lewis MD, MD, Family Medicine  Apr 10, 2024      Assessment & Plan         ICD-10-CM    1. Routine general medical examination at a health care facility  Z00.00 levonorgestrel-ethinyl estradiol (AVIANE) 0.1-20 MG-MCG tablet      2. Mild persistent asthma with exacerbation  J45.31 PROAIR  (90 Base) MCG/ACT inhaler     fluticasone (FLOVENT HFA) 220 MCG/ACT inhaler      3. Moderate persistent asthma, unspecified whether complicated  J45.40 cetirizine (ZYRTEC) 10 MG tablet      4. Seasonal allergic rhinitis due to pollen  J30.1 cetirizine (ZYRTEC) 10 MG tablet      5. Morbid obesity (H)  E66.01 phentermine 30 MG capsule      6. Rapid weight gain  R63.5       7. JOHN (generalized anxiety disorder)  F41.1       8. Other insomnia  G47.09 nortriptyline (PAMELOR) 25 MG capsule      9. Vitamin B12 deficiency (non anemic)  E53.8 **Vitamin B12 FUTURE 2mo      10. Pelvic floor relaxation  N81.89 Physical Therapy  Referral          Patient has pelvic relaxation issues and this is causing her pain when she is having sex.  We did start her on birth control as she is starting to have sex though we also discussed pelvic floor relaxation to help reduce the discomfort she is feeling in that area as there is improvement to be had.  She has been feeling unsuccessful for her weight loss and so we did add phentermine to her regimen and plan follow-up in 1 month.  She also will need her inhalers which were renewed at this time and we discussed the need for regular use of these to stay well controlled.  If she needs prescription assistance we also discussed that though she states that she is able to get her medications at this time  Lastly she did mention difficulty with anxiety like thoughts that are causing her troubles with sleep and rest and she wonders if there is more to do with this and after discussion of pros and cons and the fact that she is continues to  have some migraines despite the maximal dose of Topamax we did offer the nortriptyline at night to see if this helps both.  She no longer has any tingling in the hands and feet and so she likely is doing well on B12 but we would like to recheck this to see if she is at the point where she can stop taking them her supplement as she has not been taking it recently and likely may be in the borderline to slightly abnormal regions so labs will help us clear this up    I spent a total of 39 minutes on the day of the visit.   Time spent by me doing chart review, history and exam, documentation and further activities per the note    Debbie Lewis MD     Patient has been advised of split billing requirements and indicates understanding: Yes          Counseling  Appropriate preventive services were discussed with this patient, including applicable screening as appropriate for fall prevention, nutrition, physical activity, Tobacco-use cessation, weight loss and cognition.  Checklist reviewing preventive services available has been given to the patient.  Reviewed patient's diet, addressing concerns and/or questions.   The patient was instructed to see the dentist every 6 months.           David Romero is a 24 year old, presenting for the following:  Physical        4/10/2024     4:26 PM   Additional Questions   Roomed by tori   Accompanied by alone         4/10/2024     4:26 PM   Patient Reported Additional Medications   Patient reports taking the following new medications none        Health Care Directive  Patient does not have a Health Care Directive or Living Will: Discussed advance care planning with patient; information given to patient to review.    HPI    Patient had poor coverage of her inhalers and so she has not been taking her inhalers regularly.  She has been taking the Flovent occasionally and feels she is keeping up as often as she needs it.  She also has been struggling with reflux at times though it is  much improved with her Prilosec she is asking for an increased dose.    Weight loss response the Topamax has not been as successful as she was hoping for so she would like to add on or adjust medications  She also was having difficulty affording the B12 and has not been regularly on this but had been able to eliminate the symptoms and so has stopped taking it.          4/10/2024   General Health   How would you rate your overall physical health? Good   Feel stress (tense, anxious, or unable to sleep) Rather much   (!) STRESS CONCERN      4/10/2024   Nutrition   Three or more servings of calcium each day? (!) I DON'T KNOW   Diet: I don't know   How many servings of fruit and vegetables per day? (!) 0-1   How many sweetened beverages each day? (!) 2         4/10/2024   Exercise   Days per week of moderate/strenous exercise 0 days   Average minutes spent exercising at this level Patient declined   (!) EXERCISE CONCERN      4/10/2024   Social Factors   Frequency of gathering with friends or relatives Once a week   Worry food won't last until get money to buy more No   Food not last or not have enough money for food? No   Do you have housing?  No   Are you worried about losing your housing? No   Lack of transportation? No   Unable to get utilities (heat,electricity)? No   Want help with housing or utility concern? No   (!) HOUSING CONCERN PRESENT      4/10/2024   Dental   Dentist two times every year? (!) NO         4/10/2024   TB Screening   Were you born outside of the [place]? No               4/10/2024   Substance Use   Alcohol more than 3/day or more than 7/wk No   Do you use any other substances recreationally? No     Social History     Tobacco Use    Smoking status: Never    Smokeless tobacco: Never   Vaping Use    Vaping status: Never Used   Substance Use Topics    Alcohol use: Yes     Comment: occasionally    Drug use: No           4/10/2024   STI Screening   New sexual partner(s) since last STI/HIV test? No  "    History of abnormal Pap smear: NO - age 21-29 PAP every 3 years recommended        7/20/2022     6:02 PM   PAP / HPV   PAP Negative for Intraepithelial Lesion or Malignancy (NILM)            4/10/2024   Contraception/Family Planning   Questions about contraception or family planning No        Reviewed and updated as needed this visit by Provider   Tobacco  Allergies  Meds  Problems  Med Hx  Surg Hx  Fam Hx                     Objective    Exam  /78   Pulse 90   Temp 98.5  F (36.9  C) (Temporal)   Resp 18   Ht 1.608 m (5' 3.31\")   Wt 102.5 kg (226 lb)   LMP 03/25/2024 (Exact Date)   SpO2 99%   BMI 39.65 kg/m     Estimated body mass index is 39.65 kg/m  as calculated from the following:    Height as of this encounter: 1.608 m (5' 3.31\").    Weight as of this encounter: 102.5 kg (226 lb).    Physical Exam  GENERAL: alert and no distress  EYES: Eyes grossly normal to inspection, PERRL and conjunctivae and sclerae normal  HENT: ear canals and TM's normal, nose and mouth without ulcers or lesions  NECK: no adenopathy, no asymmetry, masses, or scars  RESP: lungs clear to auscultation - no rales, rhonchi or wheezes  CV: regular rate and rhythm, normal S1 S2, no S3 or S4, no murmur, click or rub, no peripheral edema  ABDOMEN: soft, nontender, no hepatosplenomegaly, no masses and bowel sounds normal   (female): normal female external genitalia, normal urethral meatus , and normal vaginal mucosa  She had difficulty with muscle tension and relaxation of the pelvic muscles  MS: no gross musculoskeletal defects noted, no edema  SKIN: no suspicious lesions or rashes  NEURO: Normal strength and tone, mentation intact and speech normal  PSYCH: mentation appears normal, affect normal/bright        Signed Electronically by: Debbie Lewis MD, MD    "

## 2024-04-11 LAB — VIT B12 SERPL-MCNC: 497 PG/ML (ref 232–1245)

## 2024-05-06 ENCOUNTER — OFFICE VISIT (OUTPATIENT)
Dept: FAMILY MEDICINE | Facility: CLINIC | Age: 24
End: 2024-05-06
Payer: COMMERCIAL

## 2024-05-06 VITALS
TEMPERATURE: 100.5 F | OXYGEN SATURATION: 96 % | HEIGHT: 63 IN | WEIGHT: 218 LBS | BODY MASS INDEX: 38.62 KG/M2 | HEART RATE: 115 BPM | RESPIRATION RATE: 20 BRPM | SYSTOLIC BLOOD PRESSURE: 100 MMHG | DIASTOLIC BLOOD PRESSURE: 82 MMHG

## 2024-05-06 DIAGNOSIS — Q93.81 VELOCARDIOFACIAL SYNDROME: ICD-10-CM

## 2024-05-06 DIAGNOSIS — E53.8 VITAMIN B12 DEFICIENCY (NON ANEMIC): ICD-10-CM

## 2024-05-06 DIAGNOSIS — R05.1 ACUTE COUGH: ICD-10-CM

## 2024-05-06 DIAGNOSIS — R79.89 ELEVATED TSH: ICD-10-CM

## 2024-05-06 DIAGNOSIS — J45.31 MILD PERSISTENT ASTHMA WITH EXACERBATION: Primary | ICD-10-CM

## 2024-05-06 LAB
ALBUMIN SERPL BCG-MCNC: 4.3 G/DL (ref 3.5–5.2)
ALP SERPL-CCNC: 62 U/L (ref 40–150)
ALT SERPL W P-5'-P-CCNC: 18 U/L (ref 0–50)
ANION GAP SERPL CALCULATED.3IONS-SCNC: 11 MMOL/L (ref 7–15)
AST SERPL W P-5'-P-CCNC: 25 U/L (ref 0–45)
BASOPHILS # BLD AUTO: 0 10E3/UL (ref 0–0.2)
BASOPHILS NFR BLD AUTO: 0 %
BILIRUB SERPL-MCNC: 0.3 MG/DL
BUN SERPL-MCNC: 6.8 MG/DL (ref 6–20)
CALCIUM SERPL-MCNC: 9.2 MG/DL (ref 8.6–10)
CHLORIDE SERPL-SCNC: 102 MMOL/L (ref 98–107)
CREAT SERPL-MCNC: 0.81 MG/DL (ref 0.51–0.95)
DEPRECATED HCO3 PLAS-SCNC: 22 MMOL/L (ref 22–29)
EGFRCR SERPLBLD CKD-EPI 2021: >90 ML/MIN/1.73M2
EOSINOPHIL # BLD AUTO: 0 10E3/UL (ref 0–0.7)
EOSINOPHIL NFR BLD AUTO: 0 %
ERYTHROCYTE [DISTWIDTH] IN BLOOD BY AUTOMATED COUNT: 12.6 % (ref 10–15)
GLUCOSE SERPL-MCNC: 80 MG/DL (ref 70–99)
HCT VFR BLD AUTO: 41.8 % (ref 35–47)
HGB BLD-MCNC: 13.6 G/DL (ref 11.7–15.7)
IMM GRANULOCYTES # BLD: 0 10E3/UL
IMM GRANULOCYTES NFR BLD: 0 %
LYMPHOCYTES # BLD AUTO: 0.8 10E3/UL (ref 0.8–5.3)
LYMPHOCYTES NFR BLD AUTO: 18 %
MCH RBC QN AUTO: 29.5 PG (ref 26.5–33)
MCHC RBC AUTO-ENTMCNC: 32.5 G/DL (ref 31.5–36.5)
MCV RBC AUTO: 91 FL (ref 78–100)
MONOCYTES # BLD AUTO: 0.4 10E3/UL (ref 0–1.3)
MONOCYTES NFR BLD AUTO: 8 %
NEUTROPHILS # BLD AUTO: 3.4 10E3/UL (ref 1.6–8.3)
NEUTROPHILS NFR BLD AUTO: 74 %
PLATELET # BLD AUTO: 131 10E3/UL (ref 150–450)
POTASSIUM SERPL-SCNC: 5 MMOL/L (ref 3.4–5.3)
PROT SERPL-MCNC: 7.3 G/DL (ref 6.4–8.3)
RBC # BLD AUTO: 4.61 10E6/UL (ref 3.8–5.2)
SODIUM SERPL-SCNC: 135 MMOL/L (ref 135–145)
TSH SERPL DL<=0.005 MIU/L-ACNC: 1.49 UIU/ML (ref 0.3–4.2)
VIT B12 SERPL-MCNC: 364 PG/ML (ref 232–1245)
WBC # BLD AUTO: 4.6 10E3/UL (ref 4–11)

## 2024-05-06 PROCEDURE — 36415 COLL VENOUS BLD VENIPUNCTURE: CPT | Performed by: NURSE PRACTITIONER

## 2024-05-06 PROCEDURE — 84443 ASSAY THYROID STIM HORMONE: CPT | Performed by: NURSE PRACTITIONER

## 2024-05-06 PROCEDURE — 87635 SARS-COV-2 COVID-19 AMP PRB: CPT | Performed by: NURSE PRACTITIONER

## 2024-05-06 PROCEDURE — 99214 OFFICE O/P EST MOD 30 MIN: CPT | Performed by: NURSE PRACTITIONER

## 2024-05-06 PROCEDURE — 85025 COMPLETE CBC W/AUTO DIFF WBC: CPT | Performed by: NURSE PRACTITIONER

## 2024-05-06 PROCEDURE — 82607 VITAMIN B-12: CPT | Performed by: NURSE PRACTITIONER

## 2024-05-06 PROCEDURE — G2211 COMPLEX E/M VISIT ADD ON: HCPCS | Performed by: NURSE PRACTITIONER

## 2024-05-06 PROCEDURE — 80053 COMPREHEN METABOLIC PANEL: CPT | Performed by: NURSE PRACTITIONER

## 2024-05-06 RX ORDER — AZITHROMYCIN 250 MG/1
TABLET, FILM COATED ORAL
Qty: 6 TABLET | Refills: 0 | Status: SHIPPED | OUTPATIENT
Start: 2024-05-06 | End: 2024-06-26

## 2024-05-06 RX ORDER — BUDESONIDE AND FORMOTEROL FUMARATE DIHYDRATE 80; 4.5 UG/1; UG/1
AEROSOL RESPIRATORY (INHALATION)
Qty: 20.4 G | Refills: 11 | Status: SHIPPED | OUTPATIENT
Start: 2024-05-06

## 2024-05-06 RX ORDER — PREDNISONE 20 MG/1
40 TABLET ORAL DAILY
Qty: 10 TABLET | Refills: 0 | Status: SHIPPED | OUTPATIENT
Start: 2024-05-06 | End: 2024-05-11

## 2024-05-06 ASSESSMENT — ASTHMA QUESTIONNAIRES
ACT_TOTALSCORE: 15
QUESTION_4 LAST FOUR WEEKS HOW OFTEN HAVE YOU USED YOUR RESCUE INHALER OR NEBULIZER MEDICATION (SUCH AS ALBUTEROL): ONCE A WEEK OR LESS
QUESTION_1 LAST FOUR WEEKS HOW MUCH OF THE TIME DID YOUR ASTHMA KEEP YOU FROM GETTING AS MUCH DONE AT WORK, SCHOOL OR AT HOME: MOST OF THE TIME
ACT_TOTALSCORE: 15
QUESTION_5 LAST FOUR WEEKS HOW WOULD YOU RATE YOUR ASTHMA CONTROL: SOMEWHAT CONTROLLED
QUESTION_3 LAST FOUR WEEKS HOW OFTEN DID YOUR ASTHMA SYMPTOMS (WHEEZING, COUGHING, SHORTNESS OF BREATH, CHEST TIGHTNESS OR PAIN) WAKE YOU UP AT NIGHT OR EARLIER THAN USUAL IN THE MORNING: ONCE OR TWICE
EMERGENCY_ROOM_LAST_YEAR_TOTAL: TWO
QUESTION_2 LAST FOUR WEEKS HOW OFTEN HAVE YOU HAD SHORTNESS OF BREATH: ONCE A DAY

## 2024-05-06 ASSESSMENT — ANXIETY QUESTIONNAIRES
1. FEELING NERVOUS, ANXIOUS, OR ON EDGE: NOT AT ALL
7. FEELING AFRAID AS IF SOMETHING AWFUL MIGHT HAPPEN: NOT AT ALL
2. NOT BEING ABLE TO STOP OR CONTROL WORRYING: NOT AT ALL
3. WORRYING TOO MUCH ABOUT DIFFERENT THINGS: SEVERAL DAYS
8. IF YOU CHECKED OFF ANY PROBLEMS, HOW DIFFICULT HAVE THESE MADE IT FOR YOU TO DO YOUR WORK, TAKE CARE OF THINGS AT HOME, OR GET ALONG WITH OTHER PEOPLE?: SOMEWHAT DIFFICULT
4. TROUBLE RELAXING: NOT AT ALL
5. BEING SO RESTLESS THAT IT IS HARD TO SIT STILL: NOT AT ALL
6. BECOMING EASILY ANNOYED OR IRRITABLE: NOT AT ALL
GAD7 TOTAL SCORE: 1
GAD7 TOTAL SCORE: 1
7. FEELING AFRAID AS IF SOMETHING AWFUL MIGHT HAPPEN: NOT AT ALL
IF YOU CHECKED OFF ANY PROBLEMS ON THIS QUESTIONNAIRE, HOW DIFFICULT HAVE THESE PROBLEMS MADE IT FOR YOU TO DO YOUR WORK, TAKE CARE OF THINGS AT HOME, OR GET ALONG WITH OTHER PEOPLE: SOMEWHAT DIFFICULT

## 2024-05-06 ASSESSMENT — PAIN SCALES - GENERAL: PAINLEVEL: NO PAIN (0)

## 2024-05-06 NOTE — RESULT ENCOUNTER NOTE
White blood cell count is normal.  Platelets are slightly low, this can be with illness.  More labs are processing.  Will need to recheck platelets in a month or 2 with PCP.  More labs processing.  Sincerely,   Veronica Sanon DNP

## 2024-05-06 NOTE — LETTER
May 6, 2024      Heather CARDONA Rachell  [address]   [facility] [address]        To Whom It May Concern:    Heather Lovett  was seen today.  Please excuse her  Through Tuesday- potentially Wednesday this week due to illness.        Sincerely,    Electronically signed.     SAM Martinez CNP

## 2024-05-06 NOTE — PROGRESS NOTES
Assessment & Plan     Mild persistent asthma with exacerbation  Treating for exacerbation.  Chest x-ray yesterday was normal.  Treating with with Z-Pee and prednisone.  Due to loose stools avoiding dual antibiotic therapy at this time.  Added Symbicort Smart therapy to regimen.  Patient has primary care follow-up in 2 days.  If not improving consider addition of cephalosporin/like.  Slightly tachycardic-discussed dehydration and need to push fluids.  Consideration of IV fluids, but patient is able to take p.o. also favor this.  Likely will improve over the next 2 days with medications.  If breathing is worse present to ED.  - Comprehensive metabolic panel; Future  - CBC with Platelets & Differential; Future  - azithromycin (ZITHROMAX) 250 MG tablet; 2 tablets daily on day one, then one tablet po daily until gone.  - predniSONE (DELTASONE) 20 MG tablet; Take 2 tablets (40 mg) by mouth daily for 5 days  - budesonide-formoterol (SYMBICORT) 80-4.5 MCG/ACT Inhaler; Inhale 2 puffs twice daily plus 1-2 puffs as needed. May use up to 12 puffs per day.  - Comprehensive metabolic panel  - CBC with Platelets & Differential    Velocardiofacial syndrome  Question fullness in anterior neck.  Patient states feels it is growing.  Updating ultrasound.  Had mild elevated TSH couple months ago.  - US Thyroid; Future    Elevated TSH  Rechecking levels previously elevated.  - TSH with free T4 reflex; Future  - TSH with free T4 reflex    Vitamin B12 deficiency (non anemic)  Patient has been supplementing, rechecking labs.  - Vitamin B12; Future  - Vitamin B12    Acute cough  Home COVID test not completed, assessing for COVID will treat accordingly.  - Symptomatic COVID-19 Virus (Coronavirus) by PCR Nose          MED REC REQUIRED  Post Medication Reconciliation Status: discharge medications reconciled and changed, per note/orders  BMI  Estimated body mass index is 38.15 kg/m  as calculated from the following:    Height as of this  "encounter: 1.61 m (5' 3.39\").    Weight as of this encounter: 98.9 kg (218 lb).   Weight management plan: Patient was referred to their PCP to discuss a diet and exercise plan.          David Romero is a 24 year old, presenting for the following health issues:  Hospital F/U        5/6/2024    10:23 AM   Additional Questions   Roomed by Kimberly OCAMPO   Accompanied by None         5/6/2024    10:23 AM   Patient Reported Additional Medications   Patient reports taking the following new medications NA     HPI         ED/UC Followup:    Facility:  [facility]  Date of visit: 5/5/24  Reason for visit: Fever and diarrhea   Current Status: Worsening      Viral symptoms x 3 days.  Patient had loose stools, cough and nasal congestion.  Feels worse today.  Has not treated fever today.  Acute Illness  Acute illness concerns: Vomiting   Onset/Duration: 3 days   Symptoms:  Fever: YES  Chills/Sweats: YES  Headache (location?): YES  Sinus Pressure: YES  Conjunctivitis:  no  Ear Pain: no  Rhinorrhea: YES  Congestion: YES  Sore Throat: No  Cough: YES  Wheeze: YES  Decreased Appetite: No  Nausea: YES  Vomiting: YES  Diarrhea: YES- \"Tar\"  Dysuria/Freq.: YES  Dysuria or Hematuria: No  Fatigue/Achiness: YES  Sick/Strep Exposure: YES  Therapies tried and outcome: None    Negative CXr yesterday.     Difficulty with wheezing, cough, fatigue and fever.   No vomiting X 1 1/2 days.   Did not eat this am. Able to keep fluids down.   Using nebs at home.   Not on maintenance inhalers.   See yesterdays, was to do home Covid test- has not done.         Has velocardiofacial  syndrome.   Prominent neck, mild elevated TSH prev. Pt. Feels neck fullness is more prominent.         Review of Systems  Constitutional, HEENT, cardiovascular, pulmonary, gi and gu systems are negative, except as otherwise noted.      Objective    /82   Pulse 115   Temp (!) 100.5  F (38.1  C) (Temporal)   Resp 20   Ht 1.61 m " "(5' 3.39\")   Wt 98.9 kg (218 lb)   LMP 03/25/2024 (Exact Date)   SpO2 96%   BMI 38.15 kg/m    Body mass index is 38.15 kg/m .  Physical Exam   GENERAL: alert and no distress  EYES: Eyes grossly normal to inspection, PERRL and conjunctivae and sclerae normal  HENT: normal cephalic/atraumatic, ear canals and TM's normal, nose and mouth without ulcers or lesions, oropharynx clear, and oral mucous membranes moist  NECK:  prominent neck fullness, ant, ? Thyroid goiter/enlargement, submandibular swelling.   RESP: rhonchi bilateral and throughout and breathign is relatively calm, no accessory muslceage, easy speech.   +productive cough noted.   CV: regular rate and rhythm, normal S1 S2, no S3 or S4, no murmur, click or rub, no peripheral edema  ABDOMEN: soft, nontender, no hepatosplenomegaly, no masses and bowel sounds normal  MS: no gross musculoskeletal defects noted, no edema  NEURO: Normal strength and tone, mentation intact and speech normal  PSYCH: mentation appears normal, affect flat/fatigued    Results for orders placed or performed in visit on 05/06/24   CBC with platelets and differential     Status: Abnormal   Result Value Ref Range    WBC Count 4.6 4.0 - 11.0 10e3/uL    RBC Count 4.61 3.80 - 5.20 10e6/uL    Hemoglobin 13.6 11.7 - 15.7 g/dL    Hematocrit 41.8 35.0 - 47.0 %    MCV 91 78 - 100 fL    MCH 29.5 26.5 - 33.0 pg    MCHC 32.5 31.5 - 36.5 g/dL    RDW 12.6 10.0 - 15.0 %    Platelet Count 131 (L) 150 - 450 10e3/uL    % Neutrophils 74 %    % Lymphocytes 18 %    % Monocytes 8 %    % Eosinophils 0 %    % Basophils 0 %    % Immature Granulocytes 0 %    Absolute Neutrophils 3.4 1.6 - 8.3 10e3/uL    Absolute Lymphocytes 0.8 0.8 - 5.3 10e3/uL    Absolute Monocytes 0.4 0.0 - 1.3 10e3/uL    Absolute Eosinophils 0.0 0.0 - 0.7 10e3/uL    Absolute Basophils 0.0 0.0 - 0.2 10e3/uL    Absolute Immature Granulocytes 0.0 <=0.4 10e3/uL   CBC with Platelets & Differential     Status: Abnormal    Narrative    The " following orders were created for panel order CBC with Platelets & Differential.  Procedure                               Abnormality         Status                     ---------                               -----------         ------                     CBC with platelets and d...[[id number]]  Abnormal            Final result                 Please view results for these tests on the individual orders.           Signed Electronically by: SAM Martinez CNP

## 2024-05-07 LAB — SARS-COV-2 RNA RESP QL NAA+PROBE: NEGATIVE

## 2024-05-07 NOTE — RESULT ENCOUNTER NOTE
Heather,  I have reviewed your labs, and they are all normal. If you have questions, please notify me through MyChart or a telephone call.   Veronica Sanon, DNP

## 2024-05-08 ENCOUNTER — OFFICE VISIT (OUTPATIENT)
Dept: FAMILY MEDICINE | Facility: OTHER | Age: 24
End: 2024-05-08
Payer: COMMERCIAL

## 2024-05-08 VITALS
WEIGHT: 215 LBS | DIASTOLIC BLOOD PRESSURE: 80 MMHG | OXYGEN SATURATION: 98 % | RESPIRATION RATE: 22 BRPM | BODY MASS INDEX: 38.09 KG/M2 | TEMPERATURE: 99 F | HEIGHT: 63 IN | SYSTOLIC BLOOD PRESSURE: 102 MMHG | HEART RATE: 85 BPM

## 2024-05-08 DIAGNOSIS — K21.00 GASTROESOPHAGEAL REFLUX DISEASE WITH ESOPHAGITIS WITHOUT HEMORRHAGE: Primary | ICD-10-CM

## 2024-05-08 PROCEDURE — 99213 OFFICE O/P EST LOW 20 MIN: CPT | Performed by: FAMILY MEDICINE

## 2024-05-08 RX ORDER — ONDANSETRON 4 MG/1
4 TABLET, ORALLY DISINTEGRATING ORAL EVERY 8 HOURS PRN
COMMUNITY
Start: 2024-05-05

## 2024-05-08 RX ORDER — FAMOTIDINE 20 MG/1
20 TABLET, FILM COATED ORAL 2 TIMES DAILY
COMMUNITY
Start: 2024-05-05

## 2024-05-08 ASSESSMENT — PAIN SCALES - GENERAL: PAINLEVEL: NO PAIN (0)

## 2024-05-08 NOTE — PROGRESS NOTES
Assessment & Plan         ICD-10-CM    1. Gastroesophageal reflux disease with esophagitis without hemorrhage  K21.00           Patient has had GERD for the last 4- 5 months.  And she recently had worsening.  Though she recently started on prednisone for pneumonia and asthma exacerbation she has been sick with.  She did notice worsening symptoms since this illness.  It is difficult to find timings and patterns with this to be able to be sure if this is medications or if it is diet and's we did review the importance of being careful to monitor her and potentially been write down when her symptoms are better and worse to see if we can find a pattern to it.  I did give her again a handout for GERD exacerbating medications to see if we could identify any but if she is continuing to have symptoms despite all of these interventions we certainly can consider an endoscopy but I informed her I would try and hold off given the number of other illnesses and how she is recently having an asthma exacerbation right now to see if we is more we can do with lifestyle.    No LOS data to display   Time spent by me doing chart review, history and exam, documentation and further activities per the note    Debbie Lewis MD             David Romero is a 24 year old, presenting for the following health issues:  Gastrophageal Reflux        5/8/2024     3:03 PM   Additional Questions   Roomed by tori   Accompanied by alone         5/8/2024     3:03 PM   Patient Reported Additional Medications   Patient reports taking the following new medications none     History of Present Illness       Reason for visit:  Acid reflux    She eats 0-1 servings of fruits and vegetables daily.She consumes 1 sweetened beverage(s) daily.She exercises with enough effort to increase her heart rate 9 or less minutes per day.  She exercises with enough effort to increase her heart rate 3 or less days per week.   She is taking medications regularly.    "      Medication Followup of acid reflex  Taking Medication as prescribed: NO  Side Effects:  nausea and vomiting  Medication Helping Symptoms:  NO            Objective    /80   Pulse 85   Temp 99  F (37.2  C) (Temporal)   Resp 22   Ht 1.61 m (5' 3.39\")   Wt 97.5 kg (215 lb)   LMP 04/20/2024 (Exact Date)   SpO2 98%   BMI 37.62 kg/m    Body mass index is 37.62 kg/m .  Physical Exam   GENERAL: alert and no distress  RESP: lungs with mild wheeze  CV: regular rate and rhythm, normal S1 S2, no S3 or S4, no murmur, click or rub, no peripheral edema  ABDOMEN: soft, nontender, no hepatosplenomegaly, no masses and bowel sounds normal  MS: no gross musculoskeletal defects noted, no edema  SKIN: no suspicious lesions or rashes  NEURO: Normal strength and tone, mentation intact and speech normal  PSYCH: mentation appears normal, affect normal/bright            Signed Electronically by: Debbie Lewis MD, MD    "

## 2024-05-21 ENCOUNTER — MYC MEDICAL ADVICE (OUTPATIENT)
Dept: FAMILY MEDICINE | Facility: OTHER | Age: 24
End: 2024-05-21

## 2024-06-07 ENCOUNTER — ANCILLARY PROCEDURE (OUTPATIENT)
Dept: ULTRASOUND IMAGING | Facility: OTHER | Age: 24
End: 2024-06-07
Attending: NURSE PRACTITIONER
Payer: COMMERCIAL

## 2024-06-07 DIAGNOSIS — Q93.81 VELOCARDIOFACIAL SYNDROME: ICD-10-CM

## 2024-06-07 PROCEDURE — 76536 US EXAM OF HEAD AND NECK: CPT | Mod: TC | Performed by: RADIOLOGY

## 2024-06-07 NOTE — RESULT ENCOUNTER NOTE
Hello,   The thyroid is normal on imaging.  You have an enlarged lymph node, which is considered reactive-which is normal.  If you do not feel any improvement in the next 8 weeks, please be seen in follow-up.  Please try not to push on that area daily for checking as it will remain inflamed if this is done.  Please reach out with questions.  Sincerely,   Veronica Sanon, DNP

## 2024-06-08 ENCOUNTER — MYC MEDICAL ADVICE (OUTPATIENT)
Dept: FAMILY MEDICINE | Facility: CLINIC | Age: 24
End: 2024-06-08
Payer: COMMERCIAL

## 2024-06-10 ENCOUNTER — NURSE TRIAGE (OUTPATIENT)
Dept: FAMILY MEDICINE | Facility: OTHER | Age: 24
End: 2024-06-10
Payer: COMMERCIAL

## 2024-06-10 NOTE — TELEPHONE ENCOUNTER
RN Triage    Patient Contact    Attempt # 1    Was call answered?  No.  Left message on voicemail with information to call me back.    Upon call back please triage [employer] message as needed      I was wondering if I should make another appointment because I feel it swelling up but then later it will swell down then when it s really bad I feel like I have to tilt my head up to feel comfortable and it feels like a huge lump in my throat     MyChart message sent as well.    Yasmin Garcia RN  [facility] - Registered Nurse  Clinic Triage Dany   Tracy 10, 2024

## 2024-06-10 NOTE — TELEPHONE ENCOUNTER
Patient sent My Chart message as noted below that triage was calling for symptoms.    Patient is calling back from message left by clinic nurse triage.  Patient stated she is wanting to schedule a follow up a visit prior to the 8 weeks due to having more difficulty in the evening with the lymph node feeling large, like a lump in her throat.  Patient is requesting a visit on the 13th of June because she has the day off of work that day.  Patient stated if her lymph node is doing better she will call and cancel.  Patient stating no other symptoms at this time.  Scheduled patient informing her to arrive to the clinic 20 minutes prior to her scheduled office visit to check in at the .  Patient stated understanding.    Advised patient to be seen in urgent/emergency care for worsening symptoms per RN protocol.  Patient stated understanding.    6/7/2024  Hello,  The thyroid is normal on imaging.  You have an enlarged lymph node, which is considered reactive-which is normal.  If you do not feel any improvement in the next 8 weeks, please be seen in follow-up.  Please try not to push on that area daily for checking as it will remain inflamed if this is done.  Please reach out with questions.  Sincerely,  Veronica Sanon, LAURA    Reason for Disposition   Large node present > 2 weeks    Additional Information   Negative: Sounds like a life-threatening emergency to the triager   Negative: Sore throat is main symptom and has swollen node in the neck that is < 1 inch (2.5 cm) in size   Negative: Node is in the neck and causes difficulty breathing   Negative: Patient sounds very sick or weak to the triager   Negative: Node is in the neck and can't swallow fluids   Negative: Fever > 103 F (39.4 C)   Negative: Lump or swelling in groin and pulsating (like heartbeat)   Negative: Single large node and size > 1 inch (2.5 cm)   Negative: Overlying skin is red   Negative: Rapid increase in size of node over several hours    "Negative: Tender node in the groin and has a sore, scratch, cut, or painful red area on that leg   Negative: Tender node in the armpit and has a sore, scratch, cut, or painful red area on that arm   Negative: Tender node in the neck and also has a sore throat with minimal/no runny nose or cough   Negative: Fever present > 3 days (72 hours)   Negative: Large nodes at multiple locations   Negative: Very tender to the touch but no fever   Negative: Patient wants to be seen    Answer Assessment - Initial Assessment Questions  1. LOCATION: \"Where is the swollen node located?\" \"Is the matching node on the other side of the body also swollen?\"       One side of throat, left side    2. SIZE: \"How big is the node?\" (e.g., inches or centimeters; or compared to common objects such as pea, bean, marble, golf ball)       Unknown    3. ONSET: \"When did the swelling start?\"       Approximately 2 weeks ago    4. NECK NODES: \"Is there a sore throat, runny nose or other symptoms of a cold?\"       No    5. GROIN OR ARMPIT NODES: \"Is there a sore, scratch, cut or painful red area on that arm or leg?\"       No    6. FEVER: \"Do you have a fever?\" If Yes, ask: \"What is it, how was it measured, and when did it start?\"       No    7. CAUSE: \"What do you think is causing the swollen lymph nodes?\"      Thyroid - diagnosis last week    8. OTHER SYMPTOMS: \"Do you have any other symptoms?\"      At night when laying down feels like \"choking on them\"    9. PREGNANCY: \"Is there any chance you are pregnant?\" \"When was your last menstrual period?\"      Unknown    Protocols used: Lymph Nodes - [id number]  Anika Cook RN    "

## 2024-06-10 NOTE — TELEPHONE ENCOUNTER
See telephone encounter.    Yasmin Garcia RN  [facility] - Registered Nurse  Clinic Triage Dany   Tracy 10, 2024

## 2024-06-17 ENCOUNTER — NURSE TRIAGE (OUTPATIENT)
Dept: FAMILY MEDICINE | Facility: OTHER | Age: 24
End: 2024-06-17
Payer: COMMERCIAL

## 2024-06-17 ENCOUNTER — MYC MEDICAL ADVICE (OUTPATIENT)
Dept: FAMILY MEDICINE | Facility: OTHER | Age: 24
End: 2024-06-17
Payer: COMMERCIAL

## 2024-06-17 NOTE — TELEPHONE ENCOUNTER
Nurse Triage SBAR    Is this a 2nd Level Triage? NO    Situation: called patient after mychart message    Background: patient with ongoing swollen lymph nodes. This was noted on imaging on 6/7. Patient reports there has been no improvement. She feels as if they may have gotten larger. Denies problems breathing or swallowing. No new enlarged lymph nodes. May have had a fever over the weekend but did not take temp. Throat  is not sore nor does it appear red.     Assessment: patient denies that the swelling affects her breathing or swallowing.  No new symptoms to report.     Protocol Recommended Disposition:   See in Office Within 2 Weeks    Recommendation: reviewed red alert symptoms with patient and when to call back with concerns.  Patient expressed understanding. No further questions or concerns at this time.        Does the patient meet one of the following criteria for ADS visit consideration? No  Reason for Disposition   Large node present > 2 weeks    Additional Information   Negative: Sounds like a life-threatening emergency to the triager   Negative: Sore throat is main symptom and has swollen node in the neck that is < 1 inch (2.5 cm) in size   Negative: Node is in the neck and causes difficulty breathing   Negative: Patient sounds very sick or weak to the triager   Negative: Node is in the neck and can't swallow fluids   Negative: Fever > 103 F (39.4 C)   Negative: Lump or swelling in groin and pulsating (like heartbeat)   Negative: Single large node and size > 1 inch (2.5 cm)   Negative: Overlying skin is red   Negative: Rapid increase in size of node over several hours   Negative: Tender node in the groin and has a sore, scratch, cut, or painful red area on that leg   Negative: Tender node in the armpit and has a sore, scratch, cut, or painful red area on that arm   Negative: Tender node in the neck and also has a sore throat with minimal/no runny nose or cough   Negative: Fever present > 3 days (72  hours)   Negative: Large nodes at multiple locations   Negative: Very tender to the touch but no fever   Negative: Patient wants to be seen    Protocols used: Lymph Nodes - [id number]

## 2024-06-20 ENCOUNTER — E-VISIT (OUTPATIENT)
Dept: URGENT CARE | Facility: CLINIC | Age: 24
End: 2024-06-20
Payer: COMMERCIAL

## 2024-06-20 DIAGNOSIS — R06.2 WHEEZING: Primary | ICD-10-CM

## 2024-06-20 PROCEDURE — 99207 PR NON-BILLABLE SERV PER CHARTING: CPT | Performed by: FAMILY MEDICINE

## 2024-06-20 NOTE — PATIENT INSTRUCTIONS
Dear Heather Lovett,    We are sorry you are not feeling well. Based on the responses you provided, it is recommended that you be seen in-person in urgent care so we can better evaluate your symptoms. Please click here to find the nearest urgent care location to you.   You will not be charged for this Visit. Thank you for trusting us with your care.    Leola Jara MD

## 2024-06-26 ENCOUNTER — OFFICE VISIT (OUTPATIENT)
Dept: FAMILY MEDICINE | Facility: OTHER | Age: 24
End: 2024-06-26
Payer: COMMERCIAL

## 2024-06-26 VITALS
SYSTOLIC BLOOD PRESSURE: 116 MMHG | OXYGEN SATURATION: 96 % | DIASTOLIC BLOOD PRESSURE: 84 MMHG | HEIGHT: 64 IN | TEMPERATURE: 99.1 F | RESPIRATION RATE: 20 BRPM | HEART RATE: 82 BPM | WEIGHT: 223.5 LBS | BODY MASS INDEX: 38.16 KG/M2

## 2024-06-26 DIAGNOSIS — G43.009 MIGRAINE WITHOUT AURA AND WITHOUT STATUS MIGRAINOSUS, NOT INTRACTABLE: ICD-10-CM

## 2024-06-26 DIAGNOSIS — D69.6 THROMBOCYTOPENIA (H): ICD-10-CM

## 2024-06-26 DIAGNOSIS — E66.01 MORBID OBESITY (H): ICD-10-CM

## 2024-06-26 DIAGNOSIS — H66.92 ACUTE OTITIS MEDIA, LEFT: ICD-10-CM

## 2024-06-26 DIAGNOSIS — Q93.81 VELOCARDIOFACIAL SYNDROME: Primary | ICD-10-CM

## 2024-06-26 LAB
BASOPHILS # BLD AUTO: 0 10E3/UL (ref 0–0.2)
BASOPHILS NFR BLD AUTO: 0 %
EOSINOPHIL # BLD AUTO: 0 10E3/UL (ref 0–0.7)
EOSINOPHIL NFR BLD AUTO: 1 %
ERYTHROCYTE [DISTWIDTH] IN BLOOD BY AUTOMATED COUNT: 12.8 % (ref 10–15)
HCT VFR BLD AUTO: 43.9 % (ref 35–47)
HGB BLD-MCNC: 14 G/DL (ref 11.7–15.7)
IMM GRANULOCYTES # BLD: 0 10E3/UL
IMM GRANULOCYTES NFR BLD: 0 %
LYMPHOCYTES # BLD AUTO: 1.9 10E3/UL (ref 0.8–5.3)
LYMPHOCYTES NFR BLD AUTO: 39 %
MCH RBC QN AUTO: 28.7 PG (ref 26.5–33)
MCHC RBC AUTO-ENTMCNC: 31.9 G/DL (ref 31.5–36.5)
MCV RBC AUTO: 90 FL (ref 78–100)
MONOCYTES # BLD AUTO: 0.4 10E3/UL (ref 0–1.3)
MONOCYTES NFR BLD AUTO: 8 %
NEUTROPHILS # BLD AUTO: 2.5 10E3/UL (ref 1.6–8.3)
NEUTROPHILS NFR BLD AUTO: 52 %
PLATELET # BLD AUTO: 145 10E3/UL (ref 150–450)
RBC # BLD AUTO: 4.87 10E6/UL (ref 3.8–5.2)
WBC # BLD AUTO: 4.8 10E3/UL (ref 4–11)

## 2024-06-26 PROCEDURE — 99214 OFFICE O/P EST MOD 30 MIN: CPT | Performed by: FAMILY MEDICINE

## 2024-06-26 PROCEDURE — 36415 COLL VENOUS BLD VENIPUNCTURE: CPT | Performed by: FAMILY MEDICINE

## 2024-06-26 PROCEDURE — 85025 COMPLETE CBC W/AUTO DIFF WBC: CPT | Performed by: FAMILY MEDICINE

## 2024-06-26 PROCEDURE — 82784 ASSAY IGA/IGD/IGG/IGM EACH: CPT | Performed by: FAMILY MEDICINE

## 2024-06-26 RX ORDER — AMOXICILLIN 875 MG
875 TABLET ORAL 2 TIMES DAILY
COMMUNITY
Start: 2024-06-20 | End: 2024-06-26

## 2024-06-26 RX ORDER — AMOXICILLIN 875 MG
875 TABLET ORAL 2 TIMES DAILY
Qty: 10 TABLET | Refills: 0 | Status: SHIPPED | OUTPATIENT
Start: 2024-06-26 | End: 2024-07-01

## 2024-06-26 RX ORDER — TOPIRAMATE 100 MG/1
100 TABLET, FILM COATED ORAL 2 TIMES DAILY
Qty: 60 TABLET | Refills: 1 | Status: SHIPPED | OUTPATIENT
Start: 2024-06-26 | End: 2024-07-17

## 2024-06-26 RX ORDER — LEVONORGESTREL/ETHIN.ESTRADIOL 0.1-0.02MG
1 TABLET ORAL DAILY
Qty: 84 TABLET | Status: CANCELLED | OUTPATIENT
Start: 2024-06-26

## 2024-06-26 RX ORDER — SUMATRIPTAN 50 MG/1
50 TABLET, FILM COATED ORAL
Qty: 30 TABLET | Refills: 1 | Status: CANCELLED | OUTPATIENT
Start: 2024-06-26

## 2024-06-26 ASSESSMENT — PAIN SCALES - GENERAL: PAINLEVEL: NO PAIN (0)

## 2024-06-26 ASSESSMENT — ASTHMA QUESTIONNAIRES
QUESTION_1 LAST FOUR WEEKS HOW MUCH OF THE TIME DID YOUR ASTHMA KEEP YOU FROM GETTING AS MUCH DONE AT WORK, SCHOOL OR AT HOME: SOME OF THE TIME
QUESTION_2 LAST FOUR WEEKS HOW OFTEN HAVE YOU HAD SHORTNESS OF BREATH: ONCE A DAY
ACT_TOTALSCORE: 17
EMERGENCY_ROOM_LAST_YEAR_TOTAL: ONE
QUESTION_4 LAST FOUR WEEKS HOW OFTEN HAVE YOU USED YOUR RESCUE INHALER OR NEBULIZER MEDICATION (SUCH AS ALBUTEROL): ONCE A WEEK OR LESS
QUESTION_5 LAST FOUR WEEKS HOW WOULD YOU RATE YOUR ASTHMA CONTROL: WELL CONTROLLED
ACT_TOTALSCORE: 17
QUESTION_3 LAST FOUR WEEKS HOW OFTEN DID YOUR ASTHMA SYMPTOMS (WHEEZING, COUGHING, SHORTNESS OF BREATH, CHEST TIGHTNESS OR PAIN) WAKE YOU UP AT NIGHT OR EARLIER THAN USUAL IN THE MORNING: ONCE OR TWICE

## 2024-06-26 NOTE — PROGRESS NOTES
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 Thrombocytopenia (H24)  D69.6       3. Morbid obesity (H)  E66.01 CBC with platelets and differential     topiramate (TOPAMAX) 100 MG tablet     CBC with platelets and differential      4. Migraine without aura and without status migrainosus, not intractable  G43.009       5. 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  She has recently had an ear infection for which she is 6 days into the antibiotic course without resolution.  Although she is having symptoms still she is not having as significant symptoms that she did in the exam is improving.  We did talk about if her main system response is slow that perhaps she will need a little bit longer with time and have added additional antibiotic days to try and help clear this.  Ultimately if we are still having difficulties we certainly can do lab work to see if there is anything that is continued issues followed by this.  We did get a CBC as well as an IgM to see if these are playing a role in her difficulty with response.  She was significantly concerned about her lymph nodes which have come up and down over the last few weeks though we did reassure her that lymph node cancer is never go up and down and that she is doing this with an illness and so I am not as concerned.  They should resolve after the illness resolves    I spent a total of 39 minutes on the day of the visit.   Time spent by me doing chart review, history and exam, documentation and further activities per the note    Debbie Lewis MD                 David Romero is a 24 year old,  "presenting for the following health issues:  Nasal Congestion        6/26/2024     9:35 AM   Additional Questions   Roomed by shabbir WEINSTEIN   Accompanied by self     History of Present Illness       Reason for visit:  Checking in on the lymph notes    She eats 0-1 servings of fruits and vegetables daily.She consumes 1 sweetened beverage(s) daily.She exercises with enough effort to increase her heart rate 9 or less minutes per day.  She exercises with enough effort to increase her heart rate 3 or less days per week. She is missing 1 dose(s) of medications per week.  She is not taking prescribed medications regularly due to remembering to take.         ED/UC Followup:    Facility:  [facility]  Date of visit: 6/20/2024  Reason for visit: ear infection  Current Status: same, swollen lymph nodes            Objective    /84   Pulse 82   Temp 99.1  F (37.3  C) (Temporal)   Resp 20   Ht 1.614 m (5' 3.54\")   Wt 101.4 kg (223 lb 8 oz)   LMP 06/13/2024 (Exact Date)   SpO2 96%   BMI 38.92 kg/m    Body mass index is 38.92 kg/m .  Physical Exam   GENERAL: alert and no distress  EYES: Eyes grossly normal to inspection, PERRL and conjunctivae and sclerae normal  HENT: ear canals and TM's with left side only still showing fluid present but no redness and only mild bulging, nose with congestion and mouth without ulcers or lesions  NECK: Anterior cervical adenopathy, no asymmetry, masses, or scars  RESP: lungs clear to auscultation - no rales, rhonchi or wheezes  CV: regular rate and rhythm, normal S1 S2, no S3 or S4, no murmur, click or rub, no peripheral edema  MS: no gross musculoskeletal defects noted, no edema  SKIN: no suspicious lesions or rashes  NEURO: Normal strength and tone, mentation intact and speech normal  PSYCH: mentation appears normal, affect normal/bright            Signed Electronically by: Debbie Lewis MD, MD    "

## 2024-06-27 LAB — IGM SERPL-MCNC: 54 MG/DL (ref 35–242)

## 2024-07-15 NOTE — PROGRESS NOTES
PHYSICAL THERAPY EVALUATION  Type of Visit: Evaluation       Fall Risk Screen:  Fall screen completed by: PT  Have you fallen 2 or more times in the past year?: Yes  Have you fallen and had an injury in the past year?: No  Is patient a fall risk?: No    Subjective       Presenting condition or subjective complaint: Cant get anything into the pelvic area with out so much pain. Pain with tampon use as well. When she was about 11 tried using tampons and felt sudden pain when she thinks her hymen broke. Since then doesn't use tampons anymore. She also reports urinary frequency/urgency.   Date of onset: 04/10/24 (referral)    Relevant medical history:     Dates & types of surgery:      Prior diagnostic imaging/testing results:       Prior therapy history for the same diagnosis, illness or injury: No      Living Environment  Social support: With a significant other or spouse   Type of home: [facility]; 2-story   Stairs to enter the home: Yes 15 Is there a railing: Yes     Ramp: Yes   Stairs inside the home: Yes 15 Is there a railing: Yes     Help at home: Medication and/or finances  Equipment owned:       Employment: No    Hobbies/Interests: Shopping    Patient goals for therapy: Tampons or having sex    Pain assessment: See objective evaluation for additional pain details     Objective      PELVIC EVALUATION  ADDITIONAL HISTORY:  Sex assigned at birth: Female  Gender identity: Female    Pronouns: She/Her Hers      Bladder History:  Feels bladder filling: Yes  Triggers for feeling of inability to wait to go to the bathroom: Yes Public bathrooms hearing other people  How long can you wait to urinate: 5 hrs  Gets up at night to urinate: Yes 5; Reports using bathroom many times   Can stop the flow of urine when urinating: Sometimes  Volume of urine usually released: Medium   Other issues: Slow or hesitant urine stream  Number of bladder infections in last 12 months:    Fluid intake per day:        Medications taken for  bladder: No     Activities causing urine leak: Cough  Depends on how full bladder is.   Amount of urine typically leaked:    Pads used to help with leaking: No        Bowel History:  Frequency of bowel movement: 2x/day  Consistency of stool: Soft    Ignores the urge to defecate: Sometimes  Other bowel issues:  occasional straining to have BM   Length of time spent trying to have a bowel movement:      Sexual Function History:  Sexual orientation: Bisexual    Sexually active: No  Lubrication used: Yes Yes  Pelvic pain: Deep penetration (rectal or vaginal); Use of tampon    Pain or difficulty with orgasms/erection/ejaculation: No    State of menopause:    Hormone medications: No      Are you currently pregnant: No  Have you tried pelvic floor strengthening exercises for 4 weeks: No  Do you have any history of trauma that is relevant to your care that you d like to share: No      Discussed reason for referral regarding pelvic health needs and external/internal pelvic floor muscle examination with patient/guardian.  Opportunity provided to ask questions and verbal consent for assessment and intervention was given.  PAIN: Pain Level at Rest: 0/10  Pain Level with Use: 9/10  Pain Location: pelvic floor with use of tampon or deep penetration   POSTURE: Standing Posture: Lordosis increased  LUMBAR SCREEN: AROM WFL  HIP SCREEN: ROM Hip flex min limited adelaide   FLEXIBILITY: HS mod limited adelaide     Functional Strength Testing: SLS: trendelenburg adelaide     PAIN PROVOCATION TEST: WFL  BREATHING SYMMETRY: Rib cage flaring, Decreased rib cage mobility    PELVIC EXAM  External Visual Inspection:  At rest: Elevated perineal body    Integumentary:   Introitus: Tight      Internal Digital Palpation:  Per Vagina:  Tenderness  Myofascial Resistance to Palpation: Firm  Digital Muscle Performance: P (Power): no PF response with cues 1/5  Compensations: Breath holding  Relaxation Post-Contraction: Non-relaxation  Bear down: no response   TA:  min PF response   DBE: no PF response     ABDOMINAL ASSESSMENT    Abdominal Activation/Strength: SLR: min pelvic rotation adelaide; RLE feels harder to lift than LLE       Assessment & Plan   CLINICAL IMPRESSIONS  Medical Diagnosis: Pelvic floor relaxation    Treatment Diagnosis: Pelvic floor relaxation   Impression/Assessment: Patient is a 24 year old female with pelvic floor complaints.  The following significant findings have been identified: Pain, Decreased ROM/flexibility, Decreased strength, Decreased proprioception, Decreased activity tolerance, and Impaired posture. These impairments interfere with their ability to perform self care tasks and recreational activities as compared to previous level of function.     Clinical Decision Making (Complexity):  Clinical Presentation: Stable/Uncomplicated  Clinical Presentation Rationale: based on medical and personal factors listed in PT evaluation  Clinical Decision Making (Complexity): Low complexity    PLAN OF CARE  Treatment Interventions:  Interventions: Gait Training, Manual Therapy, Neuromuscular Re-education, Therapeutic Activity, Therapeutic Exercise, Self-Care/Home Management    Long Term Goals     PT Goal 1  Goal Identifier: LTG 1  Goal Description: pt will improve PF relaxation and downtraining in order to use a tampon PL 3/10 or less  Rationale: to maximize safety and independence with performance of ADLs and functional tasks (pain free intercourse)  Goal Progress: up to 9/10 pain currently  Target Date: 09/30/24      Frequency of Treatment: 1x/week taper as able  Duration of Treatment: 10 weeks    Recommended Referrals to Other Professionals: Physical Therapy  Education Assessment:   Learner/Method: Patient;Listening    Risks and benefits of evaluation/treatment have been explained.   Patient/Family/caregiver agrees with Plan of Care.     Evaluation Time:     PT Eval, Low Complexity Minutes (24605): 30     Signing Clinician: Nikki Hernandez PT

## 2024-07-17 ENCOUNTER — VIRTUAL VISIT (OUTPATIENT)
Dept: FAMILY MEDICINE | Facility: OTHER | Age: 24
End: 2024-07-17
Payer: COMMERCIAL

## 2024-07-17 DIAGNOSIS — G47.09 OTHER INSOMNIA: ICD-10-CM

## 2024-07-17 DIAGNOSIS — F41.9 ANXIETY: Primary | ICD-10-CM

## 2024-07-17 DIAGNOSIS — E66.01 MORBID OBESITY (H): ICD-10-CM

## 2024-07-17 PROCEDURE — 99213 OFFICE O/P EST LOW 20 MIN: CPT | Mod: 95 | Performed by: FAMILY MEDICINE

## 2024-07-17 RX ORDER — TOPIRAMATE 100 MG/1
100 TABLET, FILM COATED ORAL 2 TIMES DAILY
Qty: 180 TABLET | Refills: 1 | Status: SHIPPED | OUTPATIENT
Start: 2024-07-17 | End: 2024-09-25

## 2024-07-17 ASSESSMENT — ANXIETY QUESTIONNAIRES
6. BECOMING EASILY ANNOYED OR IRRITABLE: SEVERAL DAYS
7. FEELING AFRAID AS IF SOMETHING AWFUL MIGHT HAPPEN: NOT AT ALL
GAD7 TOTAL SCORE: 8
7. FEELING AFRAID AS IF SOMETHING AWFUL MIGHT HAPPEN: NOT AT ALL
5. BEING SO RESTLESS THAT IT IS HARD TO SIT STILL: SEVERAL DAYS
GAD7 TOTAL SCORE: 8
2. NOT BEING ABLE TO STOP OR CONTROL WORRYING: MORE THAN HALF THE DAYS
4. TROUBLE RELAXING: MORE THAN HALF THE DAYS
1. FEELING NERVOUS, ANXIOUS, OR ON EDGE: SEVERAL DAYS
IF YOU CHECKED OFF ANY PROBLEMS ON THIS QUESTIONNAIRE, HOW DIFFICULT HAVE THESE PROBLEMS MADE IT FOR YOU TO DO YOUR WORK, TAKE CARE OF THINGS AT HOME, OR GET ALONG WITH OTHER PEOPLE: SOMEWHAT DIFFICULT
3. WORRYING TOO MUCH ABOUT DIFFERENT THINGS: SEVERAL DAYS
8. IF YOU CHECKED OFF ANY PROBLEMS, HOW DIFFICULT HAVE THESE MADE IT FOR YOU TO DO YOUR WORK, TAKE CARE OF THINGS AT HOME, OR GET ALONG WITH OTHER PEOPLE?: SOMEWHAT DIFFICULT

## 2024-07-17 NOTE — PROGRESS NOTES
Heather is a 24 year old who is being evaluated via a billable video visit.          Assessment & Plan         ICD-10-CM    1. Anxiety  F41.9 sertraline (ZOLOFT) 50 MG tablet      2. Other insomnia  G47.09       3. Morbid obesity (H)  E66.01 topiramate (TOPAMAX) 100 MG tablet          Has not been able to get topamax this week as she was moivng and didn't get a chance to fill. But didn't really find any mood changes with this. Only a little weight change.  Has taken lexapro in past as didn't feel better enough and didn't really take it. Has had nortriptyline for sleep and anxiety, but hasn't found effect, but also needed topamax for migraine control, so is interested in trying an ssri again and these can interact. So she was switched from nortriptyline to topamax plus sertraline to see how her mood does.       I spent a total of 14 minutes on the day of the visit.   Time spent by me doing chart review, history and exam, documentation and further activities per the note    eDbbie Lewis MD                 Subjective   Heather is a 24 year old, presenting for the following health issues:  No chief complaint on file.    History of Present Illness       Mental Health Follow-up:  Patient presents to follow-up on Depression & Anxiety.Patient's depression since last visit has been:  Medium  The patient is not having other symptoms associated with depression.  Patient's anxiety since last visit has been:  Bad  The patient is having other symptoms associated with anxiety.  Any significant life events: No  Patient is feeling anxious or having panic attacks.  Patient has no concerns about alcohol or drug use.    She eats 0-1 servings of fruits and vegetables daily.She consumes 1 sweetened beverage(s) daily.She exercises with enough effort to increase her heart rate 9 or less minutes per day.  She exercises with enough effort to increase her heart rate 3 or less days per week. She is missing 1 dose(s) of medications per week.  She  is not taking prescribed medications regularly due to remembering to take.           Review of Systems  Constitutional, HEENT, cardiovascular, pulmonary, GI, , musculoskeletal, neuro, skin, endocrine and psych systems are negative, except as otherwise noted.      Objective           Vitals:  No vitals were obtained today due to virtual visit.    Physical Exam   GENERAL: alert and no distress  EYES: Eyes grossly normal to inspection.  No discharge or erythema, or obvious scleral/conjunctival abnormalities.  RESP: No audible wheeze, cough, or visible cyanosis.    SKIN: Visible skin clear. No significant rash, abnormal pigmentation or lesions.  NEURO: Cranial nerves grossly intact.  Mentation and speech appropriate for age.  PSYCH: Appropriate affect, tone, and pace of words          Video-Visit Details    Type of service:  Video Visit   Originating Location (pt. Location): Home    Distant Location (provider location):  On-site  Platform used for Video Visit: Joseph  Signed Electronically by: Debbie Lewis MD, MD

## 2024-07-22 ENCOUNTER — THERAPY VISIT (OUTPATIENT)
Dept: PHYSICAL THERAPY | Facility: CLINIC | Age: 24
End: 2024-07-22
Payer: COMMERCIAL

## 2024-07-22 DIAGNOSIS — N81.89 PELVIC FLOOR RELAXATION: Primary | ICD-10-CM

## 2024-07-22 PROCEDURE — 97112 NEUROMUSCULAR REEDUCATION: CPT | Mod: GP

## 2024-07-22 PROCEDURE — 97110 THERAPEUTIC EXERCISES: CPT | Mod: GP

## 2024-07-22 PROCEDURE — 97161 PT EVAL LOW COMPLEX 20 MIN: CPT | Mod: GP

## 2024-08-08 ENCOUNTER — MYC MEDICAL ADVICE (OUTPATIENT)
Dept: FAMILY MEDICINE | Facility: OTHER | Age: 24
End: 2024-08-08
Payer: COMMERCIAL

## 2024-08-08 NOTE — TELEPHONE ENCOUNTER
Patient Quality Outreach    Patient is due for the following:   Asthma  -  ACT needed    Next Steps:   Patient was assigned appropriate questionnaire to complete    Type of outreach:    Sent [employer] message.      Questions for provider review:    None           Suzanne Orozco MA

## 2024-08-15 ENCOUNTER — OFFICE VISIT (OUTPATIENT)
Dept: FAMILY MEDICINE | Facility: OTHER | Age: 24
End: 2024-08-15
Payer: COMMERCIAL

## 2024-08-15 VITALS
SYSTOLIC BLOOD PRESSURE: 110 MMHG | OXYGEN SATURATION: 98 % | TEMPERATURE: 98 F | HEART RATE: 82 BPM | DIASTOLIC BLOOD PRESSURE: 88 MMHG | WEIGHT: 210.5 LBS | BODY MASS INDEX: 37.3 KG/M2 | RESPIRATION RATE: 20 BRPM | HEIGHT: 63 IN

## 2024-08-15 DIAGNOSIS — G43.009 MIGRAINE WITHOUT AURA AND WITHOUT STATUS MIGRAINOSUS, NOT INTRACTABLE: Primary | ICD-10-CM

## 2024-08-15 DIAGNOSIS — R90.89 ABNORMAL CT OF BRAIN: ICD-10-CM

## 2024-08-15 DIAGNOSIS — G23.8 CALCIFICATION OF BASAL GANGLIA (H): ICD-10-CM

## 2024-08-15 DIAGNOSIS — J45.40 MODERATE PERSISTENT ASTHMA WITHOUT COMPLICATION: ICD-10-CM

## 2024-08-15 LAB
ALBUMIN SERPL BCG-MCNC: 4.4 G/DL (ref 3.5–5.2)
ALP SERPL-CCNC: 71 U/L (ref 40–150)
ALT SERPL W P-5'-P-CCNC: 9 U/L (ref 0–50)
ANION GAP SERPL CALCULATED.3IONS-SCNC: 14 MMOL/L (ref 7–15)
AST SERPL W P-5'-P-CCNC: 13 U/L (ref 0–45)
BILIRUB SERPL-MCNC: 0.5 MG/DL
BUN SERPL-MCNC: 11.6 MG/DL (ref 6–20)
CALCIUM SERPL-MCNC: 9.2 MG/DL (ref 8.8–10.4)
CHLORIDE SERPL-SCNC: 104 MMOL/L (ref 98–107)
CREAT SERPL-MCNC: 0.77 MG/DL (ref 0.51–0.95)
EGFRCR SERPLBLD CKD-EPI 2021: >90 ML/MIN/1.73M2
GLUCOSE SERPL-MCNC: 83 MG/DL (ref 70–99)
HCO3 SERPL-SCNC: 21 MMOL/L (ref 22–29)
POTASSIUM SERPL-SCNC: 4 MMOL/L (ref 3.4–5.3)
PROT SERPL-MCNC: 7.5 G/DL (ref 6.4–8.3)
SODIUM SERPL-SCNC: 139 MMOL/L (ref 135–145)
TSH SERPL DL<=0.005 MIU/L-ACNC: 2.73 UIU/ML (ref 0.3–4.2)

## 2024-08-15 PROCEDURE — 83970 ASSAY OF PARATHORMONE: CPT

## 2024-08-15 PROCEDURE — 80053 COMPREHEN METABOLIC PANEL: CPT

## 2024-08-15 PROCEDURE — 96372 THER/PROPH/DIAG INJ SC/IM: CPT

## 2024-08-15 PROCEDURE — 36415 COLL VENOUS BLD VENIPUNCTURE: CPT

## 2024-08-15 PROCEDURE — 84443 ASSAY THYROID STIM HORMONE: CPT

## 2024-08-15 PROCEDURE — 99214 OFFICE O/P EST MOD 30 MIN: CPT | Mod: 25

## 2024-08-15 RX ORDER — KETOROLAC TROMETHAMINE 30 MG/ML
30 INJECTION, SOLUTION INTRAMUSCULAR; INTRAVENOUS ONCE
Status: COMPLETED | OUTPATIENT
Start: 2024-08-15 | End: 2024-08-15

## 2024-08-15 RX ADMIN — KETOROLAC TROMETHAMINE 30 MG: 30 INJECTION, SOLUTION INTRAMUSCULAR; INTRAVENOUS at 16:26

## 2024-08-15 ASSESSMENT — ASTHMA QUESTIONNAIRES
QUESTION_5 LAST FOUR WEEKS HOW WOULD YOU RATE YOUR ASTHMA CONTROL: WELL CONTROLLED
ACT_TOTALSCORE: 17
QUESTION_1 LAST FOUR WEEKS HOW MUCH OF THE TIME DID YOUR ASTHMA KEEP YOU FROM GETTING AS MUCH DONE AT WORK, SCHOOL OR AT HOME: SOME OF THE TIME
QUESTION_4 LAST FOUR WEEKS HOW OFTEN HAVE YOU USED YOUR RESCUE INHALER OR NEBULIZER MEDICATION (SUCH AS ALBUTEROL): ONCE A WEEK OR LESS
ACT_TOTALSCORE: 17
QUESTION_3 LAST FOUR WEEKS HOW OFTEN DID YOUR ASTHMA SYMPTOMS (WHEEZING, COUGHING, SHORTNESS OF BREATH, CHEST TIGHTNESS OR PAIN) WAKE YOU UP AT NIGHT OR EARLIER THAN USUAL IN THE MORNING: ONCE OR TWICE
QUESTION_2 LAST FOUR WEEKS HOW OFTEN HAVE YOU HAD SHORTNESS OF BREATH: ONCE A DAY

## 2024-08-15 ASSESSMENT — ENCOUNTER SYMPTOMS: HEADACHES: 1

## 2024-08-15 ASSESSMENT — PAIN SCALES - GENERAL: PAINLEVEL: SEVERE PAIN (7)

## 2024-08-15 NOTE — PROGRESS NOTES
Clinic Administered Medication Documentation        Patient was given Toradol. Prior to medication administration, verified patient's identity using patient s name and date of birth. Please see MAR and medication order for additional information. Patient instructed to remain in clinic for 15 minutes and report any adverse reaction to staff immediately.    Vial/Syringe: Single dose vial. Was entire vial of medication used? Yes

## 2024-08-15 NOTE — PATIENT INSTRUCTIONS
Please call your pharmacy to fill the Topiramate (Topamax) for migraine headache prevention and the sumatriptan (Imitrex). Topiramate is used for migraine headache prevention and should be taken DAILY. The sumatriptan is taken to abort migraine headache and is taken at time of migraine headache onset.     Today we gave you a shot of Toradol, which is a strong anti-inflammatory pain medication. AVOID taking additional NSAIDs such as ibuprofen/Advil or naproxen/Aleve for the next 24 hours. Ok to take acetaminophen (Tylenol) or migraine relief.     For the bilateral calcifications on the basal ganglia which was noted on the CT scan completed on Tuesday, I have ordered labs for further work-up. I will reach out via [employer] with results once available. I have also placed a referral to neurology for further evaluation.

## 2024-08-15 NOTE — LETTER
August 15, 2024      Heather Lovett  [address]   [place] [address]        To Whom It May Concern:    Heather Lovett  was seen on 8/13 in the emergency department and on 8/15 in the clinic.  Please excuse her 8/17/24 and absences during this week due to illness.        Sincerely,        SAM Herrera CNP

## 2024-08-15 NOTE — PROGRESS NOTES
Assessment & Plan  1. Migraine without aura and without status migrainosus, not intractable  Patient is a 24 year-old female with moderate asthma, migraine headaches, and mild intellectual disability presenting with ongoing migraine headache. Seen in [place] ED on 8/13/24 for same symptoms. Notes and diagnostics reviewed from encounter. Stopped taking migraine prevention medication (Topamax) and never started migraine abortive medication (Imitrex). Advised to refill both medications and use as prescribed to further prevent and abort migraines. Gave one time dose of IM Toradol while in clinic. Continue with as needed acetaminophen, rest, and ice/heat for pain relief. Discussed proper use of medication(s) and potential side effects. Follow-up if symptoms fail to improve despite above interventions. Patient understands and is agreeable to plan as discussed in clinic.  - ketorolac (TORADOL) injection 30 mg    2. Calcification of basal ganglia (H)  Incidental finding on CT scan, likely unrelated to current migraine headache. Plan for further metabolic work-up as ordered below. Consider referral to endocrinology pending lab results. Referral to neurology for further evaluation placed.   - Comprehensive metabolic panel (BMP + Alb, Alk Phos, ALT, AST, Total. Bili, TP)  - TSH with free T4 reflex  - Parathyroid Hormone Intact  - Adult Neurology  Referral; Future    3. Abnormal CT of brain  As above.     4. Moderate persistent asthma without complication  Stable, no sign/indication of exacerbation at this time.       David Romero is a 24 year old, presenting for the following health issues:  Headache      8/15/2024     3:35 PM   Additional Questions   Roomed by Rachel   Accompanied by Mom: [place]         8/15/2024   Forms   Any forms needing to be completed Yes          8/15/2024     3:35 PM   Patient Reported Additional Medications   Patient reports taking the following new medications Biotine     Headache       "ED/UC Followup:    Facility:  [facility]   Date of visit: 8/13/24    Reason for visit: Nonintractable headache, unspecified chronicity pattern, unspecified headache type (Primary Dx);   Abnormal CT of brain     Current Status: Still having headaches    Presents for ED follow-up after being seen on 8/13/24 at the [place] ED for migraine headache. Headache has improved since being seen in ED and being provided IV medications and fluids. Concerned about CT scan results of mineralization of bilateral basal ganglia. Has been taking as needed acetaminophen and tension headache relief from [employer]. Denies photophobia or phonophobia. Headache is worse with motion. Experienced an episode of emesis early this morning secondary to intense headache. Stopped taking daily Topamax and never filled Imitrex. Has not been able to work this week due to significance of symptoms.     Denies unilateral weakness, changes in speech, or vision changes.       Objective    /88   Pulse 82   Temp 98  F (36.7  C) (Temporal)   Resp 20   Ht 1.605 m (5' 3.19\")   Wt 95.5 kg (210 lb 8 oz)   LMP 08/04/2024   SpO2 98%   BMI 37.07 kg/m    Body mass index is 37.07 kg/m .  Physical Exam  Vitals reviewed.   Constitutional:       Appearance: Normal appearance.   HENT:      Head: Normocephalic and atraumatic.      Right Ear: Tympanic membrane, ear canal and external ear normal.      Left Ear: Tympanic membrane, ear canal and external ear normal.      Ears:      Comments: Negative for middle ear effusion, TM injection, bulging, and erythema bilaterally.     Mouth/Throat:      Mouth: Mucous membranes are moist.      Pharynx: Oropharynx is clear. No oropharyngeal exudate or posterior oropharyngeal erythema.   Eyes:      Conjunctiva/sclera: Conjunctivae normal.      Right eye: Right conjunctiva is not injected. No exudate.     Left eye: Left conjunctiva is not injected. No exudate.     Pupils: Pupils are equal, round, and reactive to " light.   Cardiovascular:      Rate and Rhythm: Normal rate and regular rhythm.      Heart sounds: Normal heart sounds, S1 normal and S2 normal. No murmur heard.  Pulmonary:      Effort: Pulmonary effort is normal.      Breath sounds: Normal breath sounds. No wheezing.      Comments: Negative for adventitious breath sounds in all lung fields.  Lymphadenopathy:      Cervical: No cervical adenopathy.   Skin:     General: Skin is warm and dry.      Capillary Refill: Capillary refill takes less than 2 seconds.      Findings: No lesion or rash.   Neurological:      General: No focal deficit present.      Mental Status: She is alert and oriented to person, place, and time.      GCS: GCS eye subscore is 4. GCS verbal subscore is 5. GCS motor subscore is 6.      Cranial Nerves: Cranial nerves 2-12 are intact. No dysarthria or facial asymmetry.      Sensory: Sensation is intact. No sensory deficit.      Motor: Motor function is intact. No weakness.      Coordination: Coordination is intact. Romberg sign negative.      Gait: Gait is intact.   Psychiatric:         Attention and Perception: Attention and perception normal.         Mood and Affect: Mood and affect normal.     Labs pending        Signed Electronically by: SAM Herrera CNP

## 2024-08-16 ENCOUNTER — TELEPHONE (OUTPATIENT)
Dept: NEUROPSYCHOLOGY | Facility: CLINIC | Age: 24
End: 2024-08-16
Payer: COMMERCIAL

## 2024-08-16 LAB — PTH-INTACT SERPL-MCNC: 40 PG/ML (ref 15–65)

## 2024-08-16 NOTE — TELEPHONE ENCOUNTER
Health Call Center    Phone Message    May a detailed message be left on voicemail: yes     Reason for Call: Appointment Intake    Referring Provider Name: Tracy Guallpa APRN CNP  Diagnosis and/or Symptoms: Calcification of basal ganglia  Writer unable to schedule due to diagnosis not being in protocols.    Please call Heather for scheduling at [phone].    Action Taken: Message routed to:  [facility] Neurology    Travel Screening: Not Applicable     Date of Service:

## 2024-08-19 ENCOUNTER — TELEPHONE (OUTPATIENT)
Dept: NEUROLOGY | Facility: CLINIC | Age: 24
End: 2024-08-19
Payer: COMMERCIAL

## 2024-08-19 NOTE — TELEPHONE ENCOUNTER
[facility]    Phone Message    May a detailed message be left on voicemail: yes     Reason for Call: Other: Heather would like to schedule this appointment in [place]. Per protocols, writer is to send a message to the clinical pool.     Action Taken: Message routed to:  Other:  NEUROLOGY    Travel Screening: Not Applicable     Date of Service:

## 2024-08-19 NOTE — TELEPHONE ENCOUNTER
I called and LM for patient to return call and reschedule New patient appt with either Dr. Hamilton or Dr. White

## 2024-08-19 NOTE — TELEPHONE ENCOUNTER
Left Voicemail (1st Attempt) and Sent Mychart (1st Attempt) for the patient to call back and schedule the following:    Appointment type: New General Neuro  Provider: Any  Return date: Next avail  Specialty phone number: [phone]  Additional appointment(s) needed:   Additonal Notes:

## 2024-09-25 ENCOUNTER — MYC REFILL (OUTPATIENT)
Dept: FAMILY MEDICINE | Facility: OTHER | Age: 24
End: 2024-09-25

## 2024-09-25 ENCOUNTER — VIRTUAL VISIT (OUTPATIENT)
Dept: FAMILY MEDICINE | Facility: OTHER | Age: 24
End: 2024-09-25
Payer: COMMERCIAL

## 2024-09-25 DIAGNOSIS — F41.9 ANXIETY: Primary | ICD-10-CM

## 2024-09-25 DIAGNOSIS — G44.209 TENSION HEADACHE: ICD-10-CM

## 2024-09-25 DIAGNOSIS — K92.1 BLOOD IN STOOL: ICD-10-CM

## 2024-09-25 PROCEDURE — 99214 OFFICE O/P EST MOD 30 MIN: CPT | Mod: 95 | Performed by: FAMILY MEDICINE

## 2024-09-25 RX ORDER — SERTRALINE HYDROCHLORIDE 100 MG/1
100 TABLET, FILM COATED ORAL DAILY
Qty: 90 TABLET | Refills: 0 | Status: SHIPPED | OUTPATIENT
Start: 2024-09-25

## 2024-09-25 ASSESSMENT — ANXIETY QUESTIONNAIRES
7. FEELING AFRAID AS IF SOMETHING AWFUL MIGHT HAPPEN: MORE THAN HALF THE DAYS
GAD7 TOTAL SCORE: 13
8. IF YOU CHECKED OFF ANY PROBLEMS, HOW DIFFICULT HAVE THESE MADE IT FOR YOU TO DO YOUR WORK, TAKE CARE OF THINGS AT HOME, OR GET ALONG WITH OTHER PEOPLE?: NOT DIFFICULT AT ALL
GAD7 TOTAL SCORE: 13
GAD7 TOTAL SCORE: 13

## 2024-09-25 ASSESSMENT — ASTHMA QUESTIONNAIRES
QUESTION_1 LAST FOUR WEEKS HOW MUCH OF THE TIME DID YOUR ASTHMA KEEP YOU FROM GETTING AS MUCH DONE AT WORK, SCHOOL OR AT HOME: SOME OF THE TIME
QUESTION_4 LAST FOUR WEEKS HOW OFTEN HAVE YOU USED YOUR RESCUE INHALER OR NEBULIZER MEDICATION (SUCH AS ALBUTEROL): ONCE A WEEK OR LESS
QUESTION_2 LAST FOUR WEEKS HOW OFTEN HAVE YOU HAD SHORTNESS OF BREATH: MORE THAN ONCE A DAY
EMERGENCY_ROOM_LAST_YEAR_TOTAL: ONE
QUESTION_5 LAST FOUR WEEKS HOW WOULD YOU RATE YOUR ASTHMA CONTROL: WELL CONTROLLED
QUESTION_3 LAST FOUR WEEKS HOW OFTEN DID YOUR ASTHMA SYMPTOMS (WHEEZING, COUGHING, SHORTNESS OF BREATH, CHEST TIGHTNESS OR PAIN) WAKE YOU UP AT NIGHT OR EARLIER THAN USUAL IN THE MORNING: NOT AT ALL
ACT_TOTALSCORE: 17
ACT_TOTALSCORE: 17

## 2024-09-25 ASSESSMENT — ENCOUNTER SYMPTOMS: NERVOUS/ANXIOUS: 1

## 2024-09-25 NOTE — PROGRESS NOTES
Heather is a 24 year old who is being evaluated via a billable video visit.    How would you like to obtain your AVS? MyChart  If the video visit is dropped, the invitation should be resent by: Text to cell phone: [phone]  Will anyone else be joining your video visit? No      Assessment & Plan         ICD-10-CM    1. Anxiety  F41.9 sertraline (ZOLOFT) 100 MG tablet      2. Blood in stool  K92.1       3. Tension headache  G44.209           Patient has significant anxiety that is much improved since taking the medications and will continue this though we did talk about an increase in dosing as she is not to get to her goal.  She had headaches at times and she is feeling so especially stressed and she did get an evaluation done in urgent care where she had a CT showing some increased calcifications in the basal ganglia.  She has a follow-up appointment with neurology to evaluate this further though this is not the source of her headaches.  We will continue to work on her headaches with anxiety reduction medications and hope that this will be sufficient at this next dose.  This we did ask her to follow-up in 1 month to see if her on track.  She also had noted blood in the stool which we reassured her that the most common reasons for this are tears and hemorrhoids and that conservative management should be sufficient as we did ask her to use stool softeners to keep the stool soft and easy to pass but if she continues to have these episodes she can come in person and we can evaluate this better.  She did stop the Topamax due to reflux which is improved at this time.  As her anxiety has improved this has also improved.  I do intend to reintroduce the Topamax at some point to see if we this is better tolerated as that should help her headaches as well as her weight loss if we can get it back again.  We will revisit this at one of her follow-up appointments.    No LOS data to display   Time spent by me doing chart  review, history and exam, documentation and further activities per the note    Debbie Lewis MD                 David Romero is a 24 year old, presenting for the following health issues:  Anxiety        9/25/2024     5:14 PM   Additional Questions   Roomed by tori   Accompanied by alone         9/25/2024     5:14 PM   Patient Reported Additional Medications   Patient reports taking the following new medications none     History of Present Illness       Mental Health Follow-up:  Patient presents to follow-up on Anxiety.    Patient's anxiety since last visit has been:  Better  The patient is not having other symptoms associated with anxiety.  Any significant life events: No  Patient is feeling anxious or having panic attacks.  Patient has no concerns about alcohol or drug use.    She eats 2-3 servings of fruits and vegetables daily.She consumes 1 sweetened beverage(s) daily.She exercises with enough effort to increase her heart rate 9 or less minutes per day.  She exercises with enough effort to increase her heart rate 3 or less days per week.   She is taking medications regularly.     No panic attacks since starrng sertraline.  Had reflux with topamax. Stopped that and feels better. Also feeling mental health better with her sertraline.    Today had blood on wiping after having BM.     Was seen for UC visit for Headaches - had particles on the CT. Wonders if connected to thyroid. Set up neurology.     Review of Systems  Constitutional, HEENT, cardiovascular, pulmonary, GI, , musculoskeletal, neuro, skin, endocrine and psych systems are negative, except as otherwise noted.      Objective    Vitals - Patient Reported  Pain Score: No Pain (0)        Physical Exam   GENERAL: alert and no distress  EYES: Eyes grossly normal to inspection.  No discharge or erythema, or obvious scleral/conjunctival abnormalities.  RESP: No audible wheeze, cough, or visible cyanosis.    SKIN: Visible skin clear. No significant rash,  abnormal pigmentation or lesions.  NEURO: Cranial nerves grossly intact.  Mentation and speech appropriate for age.  PSYCH: Appropriate affect, tone, and pace of words          Video-Visit Details    Type of service:  Video Visit   Originating Location (pt. Location): Home    Distant Location (provider location):  On-site  Platform used for Video Visit: Joseph  Signed Electronically by: Debbie Lewis MD, MD

## 2024-09-26 RX ORDER — FAMOTIDINE 20 MG/1
20 TABLET, FILM COATED ORAL 2 TIMES DAILY
OUTPATIENT
Start: 2024-09-26

## 2024-09-26 NOTE — TELEPHONE ENCOUNTER
Overdue for needed care. Please call to schedule. Not prescrbied by us previously or discussed. Appt needed to fill.

## 2024-10-17 ENCOUNTER — E-VISIT (OUTPATIENT)
Dept: FAMILY MEDICINE | Facility: OTHER | Age: 24
End: 2024-10-17
Payer: COMMERCIAL

## 2024-10-17 DIAGNOSIS — Q93.81 VELOCARDIOFACIAL SYNDROME: ICD-10-CM

## 2024-10-17 DIAGNOSIS — H90.3 BILATERAL SENSORINEURAL HEARING LOSS: Primary | ICD-10-CM

## 2024-10-17 PROCEDURE — 99421 OL DIG E/M SVC 5-10 MIN: CPT | Performed by: FAMILY MEDICINE

## 2024-10-18 NOTE — PATIENT INSTRUCTIONS
Thank you for choosing us for your care. I have placed the below referral(s) for you:  Orders Placed This Encounter   Procedures     Adult Audiology  Referral     ENT  Referral, Adult     Please click the link for your After Visit Summary to view scheduling instructions for your referral. In most cases, you will be contacted within 2 business days to schedule. If you do not hear from a representative within that time, please call [phone] to be connected to a .

## 2024-10-21 ENCOUNTER — TELEPHONE (OUTPATIENT)
Dept: FAMILY MEDICINE | Facility: OTHER | Age: 24
End: 2024-10-21
Payer: COMMERCIAL

## 2024-10-21 NOTE — TELEPHONE ENCOUNTER
INCOMING FORMS    Sender: [facility]    Type of Form, letter or note (What is requested?): progress notes    How was the form received?: Fax    How should forms be returned?:  Fax : [phone]    Form placed in OOO bin for review/signature if appropriate.

## 2024-10-22 NOTE — TELEPHONE ENCOUNTER
Faxed and sent to scanning.   Oral Minoxidil Counseling- I discussed with the patient the risks of oral minoxidil including but not limited to shortness of breath, swelling of the feet or ankles, dizziness, lightheadedness, unwanted hair growth and allergic reaction.  The patient verbalized understanding of the proper use and possible adverse effects of oral minoxidil.  All of the patient's questions and concerns were addressed.

## 2024-10-26 NOTE — PROGRESS NOTES
"                                             PEDS Cardiac Letter  Date: 10/26/2024      Debbie Lewis, MG  [address] [address]      PATIENT: Heather Lovett  :         2000   MRN:         [id number]    Dear Dr Lewis:    Heather is 24 years old and was seen at the [facility] on 2024.  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  From a cardiac standpoint she has remained asymptomatic.  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  She has not experienced any palpitations or syncope.  She occasionally notices some aching in the right infraclavicular area when she is anxious.  She has been on Zoloft for a few months with some improvement.  She feels that she tires more easily in the cooler weather.    On physical examination her height was 1.596 m (5' 2.84\") and her weight was 99.8 kg (220 lb 0.3 oz). Her heart rate was 82 and respirations 22 per minute. The blood pressure in her right arm was 111/68. She was acyanotic, warm and well perfused. She was alert, cooperative, and in no distress.  There was mild wheezing over both lung fields without respiratory distress. She had a regular rhythm with a grade 1/6 to 2/6 systolic ejection murmur at the upper right sternal border referred to the mid left sternal border. The second heart sound was physiologically split with a normal pulmonary component. There was no organomegaly or abdominal tenderness. Peripheral pulses were 2+ and equal in all extremities. There was no clubbing or edema.    An echocardiogram performed today that I personally reviewed showed a right-sided Gothic aortic arch with a peak gradient of 35 mmHg and a mean gradient of 9 mmHg.  There was normal flow in the abdominal aorta and no left ventricular hypertrophy.  I explained these findings to her.    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  I would like her to return in 2 years with an echocardiogram.  She does not need any activity restrictions.    Thank you very much for your confidence in allowing me to participate in Heather's care. If you have any questions or concerns, please don't hesitate to contact me.    Sincerely,      Del Walters M.D.   Pediatric Cardiology   [facility]  Pediatric Specialty Clinic  [phone]    Note: Chart documentation done in part with Dragon Voice Recognition software. Although reviewed after completion, some word and grammatical errors may remain.

## 2024-11-07 ENCOUNTER — ANCILLARY PROCEDURE (OUTPATIENT)
Dept: CARDIOLOGY | Facility: CLINIC | Age: 24
End: 2024-11-07
Attending: PEDIATRICS
Payer: COMMERCIAL

## 2024-11-07 ENCOUNTER — OFFICE VISIT (OUTPATIENT)
Dept: CARDIOLOGY | Facility: CLINIC | Age: 24
End: 2024-11-07
Payer: COMMERCIAL

## 2024-11-07 VITALS
HEART RATE: 82 BPM | BODY MASS INDEX: 38.98 KG/M2 | SYSTOLIC BLOOD PRESSURE: 111 MMHG | RESPIRATION RATE: 22 BRPM | OXYGEN SATURATION: 100 % | WEIGHT: 220.02 LBS | DIASTOLIC BLOOD PRESSURE: 68 MMHG | HEIGHT: 63 IN

## 2024-11-07 DIAGNOSIS — Q93.81 VELOCARDIOFACIAL SYNDROME: Primary | ICD-10-CM

## 2024-11-07 DIAGNOSIS — Q93.81 VELOCARDIOFACIAL SYNDROME: ICD-10-CM

## 2024-11-07 DIAGNOSIS — Q25.40 CONGENITAL ANOMALY OF AORTIC ARCH: ICD-10-CM

## 2024-11-07 DIAGNOSIS — Q25.40 CONGENITAL ANOMALY OF AORTIC ARCH: Primary | ICD-10-CM

## 2024-11-07 PROCEDURE — 93320 DOPPLER ECHO COMPLETE: CPT | Performed by: PEDIATRICS

## 2024-11-07 PROCEDURE — 93303 ECHO TRANSTHORACIC: CPT | Performed by: PEDIATRICS

## 2024-11-07 PROCEDURE — 99213 OFFICE O/P EST LOW 20 MIN: CPT | Mod: 25 | Performed by: PEDIATRICS

## 2024-11-07 PROCEDURE — 93325 DOPPLER ECHO COLOR FLOW MAPG: CPT | Performed by: PEDIATRICS

## 2024-11-07 NOTE — PATIENT INSTRUCTIONS
Thank you for choosing [facility]. It was a pleasure to see you for your office visit today.     If you have any questions or scheduling needs during regular office hours, please call: [phone]  If urgent concerns arise after hours, you can call [phone] and ask to speak to the pediatric specialist on call.   If you need to schedule Imaging/Radiology tests, please call: [phone]  DoubleRecall messages are for routine communication and questions and are usually answered within 48-72 hours. If you have an urgent concern or require sooner response, please call us.  Outside lab and imaging results should be faxed to [phone].  If you go to a lab outside of [facility] we will not automatically get those results. You will need to ask to have them faxed.   You may receive a survey regarding your experience with the clinic today. We would appreciate your feedback.   We encourage to you make your follow-up today to ensure a timely appointment. If you are unable to do so please reach out to [phone] as soon as possible.       If you had any blood work, imaging or other tests completed today:  Normal test results will be mailed to your home address in a letter.  Abnormal results will be communicated to you via phone call/letter.  Please allow up to 1-2 weeks for processing and interpretation of most lab work.

## 2024-11-27 ENCOUNTER — OFFICE VISIT (OUTPATIENT)
Dept: FAMILY MEDICINE | Facility: OTHER | Age: 24
End: 2024-11-27
Payer: COMMERCIAL

## 2024-11-27 VITALS
SYSTOLIC BLOOD PRESSURE: 122 MMHG | OXYGEN SATURATION: 98 % | RESPIRATION RATE: 22 BRPM | HEIGHT: 63 IN | WEIGHT: 223 LBS | DIASTOLIC BLOOD PRESSURE: 68 MMHG | HEART RATE: 107 BPM | TEMPERATURE: 98.8 F | BODY MASS INDEX: 39.51 KG/M2

## 2024-11-27 DIAGNOSIS — E53.8 VITAMIN B12 DEFICIENCY (NON ANEMIC): ICD-10-CM

## 2024-11-27 DIAGNOSIS — F41.9 ANXIETY: ICD-10-CM

## 2024-11-27 DIAGNOSIS — H61.21 IMPACTED CERUMEN OF RIGHT EAR: ICD-10-CM

## 2024-11-27 DIAGNOSIS — F33.8 SEASONAL AFFECTIVE DISORDER (H): ICD-10-CM

## 2024-11-27 DIAGNOSIS — R79.89 ELEVATED TSH: ICD-10-CM

## 2024-11-27 DIAGNOSIS — F41.1 GAD (GENERALIZED ANXIETY DISORDER): Primary | ICD-10-CM

## 2024-11-27 PROBLEM — E73.9 LACTOSE INTOLERANCE: Status: RESOLVED | Noted: 2019-10-25 | Resolved: 2024-11-27

## 2024-11-27 LAB
ERYTHROCYTE [DISTWIDTH] IN BLOOD BY AUTOMATED COUNT: 13.4 % (ref 10–15)
HCT VFR BLD AUTO: 40.9 % (ref 35–47)
HGB BLD-MCNC: 13.2 G/DL (ref 11.7–15.7)
MCH RBC QN AUTO: 29.7 PG (ref 26.5–33)
MCHC RBC AUTO-ENTMCNC: 32.3 G/DL (ref 31.5–36.5)
MCV RBC AUTO: 92 FL (ref 78–100)
PLATELET # BLD AUTO: 185 10E3/UL (ref 150–450)
RBC # BLD AUTO: 4.44 10E6/UL (ref 3.8–5.2)
TSH SERPL DL<=0.005 MIU/L-ACNC: 2.62 UIU/ML (ref 0.3–4.2)
VIT B12 SERPL-MCNC: 227 PG/ML (ref 232–1245)
WBC # BLD AUTO: 5.2 10E3/UL (ref 4–11)

## 2024-11-27 PROCEDURE — 82607 VITAMIN B-12: CPT | Performed by: FAMILY MEDICINE

## 2024-11-27 PROCEDURE — 84443 ASSAY THYROID STIM HORMONE: CPT | Performed by: FAMILY MEDICINE

## 2024-11-27 PROCEDURE — 36415 COLL VENOUS BLD VENIPUNCTURE: CPT | Performed by: FAMILY MEDICINE

## 2024-11-27 PROCEDURE — 85027 COMPLETE CBC AUTOMATED: CPT | Performed by: FAMILY MEDICINE

## 2024-11-27 RX ORDER — FLUTICASONE PROPIONATE 220 UG/1
2 AEROSOL, METERED RESPIRATORY (INHALATION) 2 TIMES DAILY
COMMUNITY
Start: 2024-04-11

## 2024-11-27 RX ORDER — FLUTICASONE PROPIONATE 50 MCG
2 SPRAY, SUSPENSION (ML) NASAL DAILY
COMMUNITY
Start: 2024-10-27

## 2024-11-27 RX ORDER — TOPIRAMATE 100 MG/1
100 TABLET, FILM COATED ORAL 2 TIMES DAILY
COMMUNITY
Start: 2024-08-16 | End: 2024-11-27

## 2024-11-27 RX ORDER — PHENTERMINE HYDROCHLORIDE 30 MG/1
30 CAPSULE ORAL EVERY MORNING
COMMUNITY
Start: 2024-04-11 | End: 2024-11-27

## 2024-11-27 RX ORDER — SERTRALINE HYDROCHLORIDE 100 MG/1
100 TABLET, FILM COATED ORAL DAILY
Qty: 90 TABLET | Refills: 1 | Status: SHIPPED | OUTPATIENT
Start: 2024-11-27

## 2024-11-27 ASSESSMENT — ANXIETY QUESTIONNAIRES
GAD7 TOTAL SCORE: 5
1. FEELING NERVOUS, ANXIOUS, OR ON EDGE: SEVERAL DAYS
3. WORRYING TOO MUCH ABOUT DIFFERENT THINGS: SEVERAL DAYS
5. BEING SO RESTLESS THAT IT IS HARD TO SIT STILL: NOT AT ALL
GAD7 TOTAL SCORE: 5
7. FEELING AFRAID AS IF SOMETHING AWFUL MIGHT HAPPEN: NOT AT ALL
6. BECOMING EASILY ANNOYED OR IRRITABLE: NOT AT ALL
2. NOT BEING ABLE TO STOP OR CONTROL WORRYING: MORE THAN HALF THE DAYS
IF YOU CHECKED OFF ANY PROBLEMS ON THIS QUESTIONNAIRE, HOW DIFFICULT HAVE THESE PROBLEMS MADE IT FOR YOU TO DO YOUR WORK, TAKE CARE OF THINGS AT HOME, OR GET ALONG WITH OTHER PEOPLE: NOT DIFFICULT AT ALL

## 2024-11-27 ASSESSMENT — ASTHMA QUESTIONNAIRES
QUESTION_4 LAST FOUR WEEKS HOW OFTEN HAVE YOU USED YOUR RESCUE INHALER OR NEBULIZER MEDICATION (SUCH AS ALBUTEROL): ONCE A WEEK OR LESS
QUESTION_1 LAST FOUR WEEKS HOW MUCH OF THE TIME DID YOUR ASTHMA KEEP YOU FROM GETTING AS MUCH DONE AT WORK, SCHOOL OR AT HOME: A LITTLE OF THE TIME
ACT_TOTALSCORE: 21
ACT_TOTALSCORE: 21
QUESTION_5 LAST FOUR WEEKS HOW WOULD YOU RATE YOUR ASTHMA CONTROL: WELL CONTROLLED
QUESTION_2 LAST FOUR WEEKS HOW OFTEN HAVE YOU HAD SHORTNESS OF BREATH: ONCE OR TWICE A WEEK
QUESTION_3 LAST FOUR WEEKS HOW OFTEN DID YOUR ASTHMA SYMPTOMS (WHEEZING, COUGHING, SHORTNESS OF BREATH, CHEST TIGHTNESS OR PAIN) WAKE YOU UP AT NIGHT OR EARLIER THAN USUAL IN THE MORNING: NOT AT ALL

## 2024-11-27 ASSESSMENT — PAIN SCALES - GENERAL: PAINLEVEL_OUTOF10: NO PAIN (0)

## 2024-11-27 ASSESSMENT — PATIENT HEALTH QUESTIONNAIRE - PHQ9
5. POOR APPETITE OR OVEREATING: SEVERAL DAYS
SUM OF ALL RESPONSES TO PHQ QUESTIONS 1-9: 12

## 2024-11-27 NOTE — PROGRESS NOTES
Assessment & Plan         ICD-10-CM    1. JOHN (generalized anxiety disorder)  F41.1 SAD Light, 10,000 Lux Order for DME - ONLY FOR DME      2. Seasonal affective disorder (H)  F33.8 SAD Light, 10,000 Lux Order for DME - ONLY FOR DME      3. Vitamin B12 deficiency (non anemic)  E53.8 CBC with platelets     Vitamin B12     CBC with platelets     Vitamin B12      4. Elevated TSH  R79.89 TSH WITH FREE T4 REFLEX     TSH WITH FREE T4 REFLEX      5. Impacted cerumen of right ear  H61.21 REMOVE IMPACTED CERUMEN          Hearing has been poor and she was interested in potentially talking about hearing aids though she has had recurrent ear infections recently as well.  Today she just had cerumen blocking her right ear that was removed but still having poor hearing complaints with no infection seen at this time so she likely should be seen by audiology for further hearing evaluation.  We had also talked about her recurrent ear infections as she previously had ear tubes which had come out and has now again started having several ear infections and so she is a candidate to visit back with ENT about potentially replacing these.  Lastly she is having more sadness this winter and states that she has noticed a little bit of this the previous seasons but had not yet addressed it as she felt her anxiety was not controlled.  Anxiety is doing well at this time just feeling more sad so asking if she should make another choice.  We did talk about potentially increasing the medications though we also offered her mood like as this may potentially be beneficial without medication side effects.  She is agreeable to this and so we did order the would like to the pharmacy for her.  If she is not able to pick this up we certainly can also consider increasing of the sertraline which she was agreeable to as well.      No LOS data to display   Time spent by me today doing chart review, history and exam, documentation and further activities per  "the note    Debbie Lewis MD           BMI  Estimated body mass index is 39.17 kg/m  as calculated from the following:    Height as of this encounter: 1.607 m (5' 3.27\").    Weight as of this encounter: 101.2 kg (223 lb).   Weight management plan: Discussed healthy diet and exercise guidelines    Depression Screening Follow Up        11/27/2024     7:03 AM   PHQ   PHQ-9 Total Score 12   Q9: Thoughts of better off dead/self-harm past 2 weeks Not at all           Follow Up Actions Taken  Crisis resource information provided in After Visit Summary           David Romero is a 24 year old, presenting for the following health issues:  Hearing Problem        11/27/2024     6:57 AM   Additional Questions   Roomed by Jerry     Via the Health Maintenance questionnaire, the patient has reported the following services have been completed -Chlamydia: [place] fv 2024-11-27, this information has not been sent to the abstraction team.  History of Present Illness       Reason for visit:  Hearing check up and i wanna talk about my depression    She eats 0-1 servings of fruits and vegetables daily.She consumes 2 sweetened beverage(s) daily.She exercises with enough effort to increase her heart rate 9 or less minutes per day.  She exercises with enough effort to increase her heart rate 3 or less days per week. She is missing 1 dose(s) of medications per week.  She is not taking prescribed medications regularly due to remembering to take.         Depression   How are you doing with your depression since your last visit? Worsened   Are you having other symptoms that might be associated with depression? No  Have you had a significant life event?  No   Are you feeling anxious or having panic attacks?   No  Do you have any concerns with your use of alcohol or other drugs? No    Social History     Tobacco Use    Smoking status: Never    Smokeless tobacco: Never   Vaping Use    Vaping status: Never Used   Substance Use Topics    Alcohol " "use: Yes     Comment: occasionally    Drug use: No         11/3/2022     2:06 PM 1/24/2024     4:43 PM 11/27/2024     7:03 AM   PHQ   PHQ-9 Total Score 10 9 12   Q9: Thoughts of better off dead/self-harm past 2 weeks Not at all Not at all  Not at all       Patient-reported         7/17/2024     5:03 PM 9/25/2024     5:11 PM 11/27/2024     7:03 AM   JOHN-7 SCORE   Total Score 8 (mild anxiety) 13 (moderate anxiety)    Total Score 8 13 5         11/27/2024     7:03 AM   Last PHQ-9   1.  Little interest or pleasure in doing things 2   2.  Feeling down, depressed, or hopeless 2   3.  Trouble falling or staying asleep, or sleeping too much 1   4.  Feeling tired or having little energy 3   5.  Poor appetite or overeating 2   6.  Feeling bad about yourself 1   7.  Trouble concentrating 1   8.  Moving slowly or restless 0   Q9: Thoughts of better off dead/self-harm past 2 weeks 0   PHQ-9 Total Score 12   Difficulty at work, home, or with people Not difficult at all         11/27/2024     7:03 AM   JOHN-7    1. Feeling nervous, anxious, or on edge 1   2. Not being able to stop or control worrying 2   3. Worrying too much about different things 1   4. Trouble relaxing 1   5. Being so restless that it is hard to sit still 0   6. Becoming easily annoyed or irritable 0   7. Feeling afraid, as if something awful might happen 0   JOHN-7 Total Score 5   If you checked any problems, how difficult have they made it for you to do your work, take care of things at home, or get along with other people? Not difficult at all       Suicide Assessment Five-step Evaluation and Treatment (SAFE-T)            Review of Systems  Constitutional, HEENT, cardiovascular, pulmonary, GI, , musculoskeletal, neuro, skin, endocrine and psych systems are negative, except as otherwise noted.      Objective    /68   Pulse 107   Temp 98.8  F (37.1  C) (Temporal)   Resp 22   Ht 1.607 m (5' 3.27\")   Wt 101.2 kg (223 lb)   SpO2 98%   BMI 39.17 kg/m  "   Body mass index is 39.17 kg/m .  Physical Exam   GENERAL: alert and no distress  EYES: Eyes grossly normal to inspection, PERRL and conjunctivae and sclerae normal  HENT: ear canals and TM's normal, nose and mouth without ulcers or lesions  HENT: ear canal on right initially blocked with wax and this was removed  NECK: no adenopathy, no asymmetry, masses, or scars  RESP: lungs clear to auscultation - no rales, rhonchi or wheezes  CV: regular rate and rhythm, normal S1 S2, no S3 or S4, no murmur, click or rub, no peripheral edema  ABDOMEN: soft, nontender, no hepatosplenomegaly, no masses and bowel sounds normal  MS: no gross musculoskeletal defects noted, no edema  SKIN: no suspicious lesions or rashes  NEURO: Normal strength and tone, mentation intact and speech normal  PSYCH: mentation appears normal, affect normal/bright            Signed Electronically by: Debbie Lewis MD, MD

## 2024-11-27 NOTE — PROGRESS NOTES
[facility]  Section of General Neurology  New Patient Visit      Heather Lovett MRN# [id number]   Age: 24 year old YOB: 2000              Assessment and Plan:   Assessment:  Heather Lovett is a pleasant 24 year old female who presents today for evaluation of headaches, incidental finding of basal ganglia calcification.  To review her headaches are at times tension headaches, infrequently migraine type headaches as well.  These are mostly well controlled on imitrex.  Discussed options should they worsen, nutritional options.  We reviewed her CT findings.  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  I don't think these are necessarily causing any problems at this time but there also is not a great way to prevent or act on them as it relates to this syndrome.  She has had appropriate work up it would seem with normal parathyroid testing.   Nothing actionable needs to be done in this regard.   Reviewed this imaging together.      Plan:  Magnesium oxide 400 mg Riboflavin (vitamin b2) 400 mg daily  Imitrex--- encouraged to take early, trial 1/2 tab if gets to sleepy on it.   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  Has this dx Can relate to  bilateral basal ganglia calcifications--does have this.     Last bad headache in August, couldn't move her head.    Under a lot of stress provokes them .  Highly variably  Imitrex does help  Average 1-2 per month  Knows the difference  "between \"regular\" and migraine headaches    Medication list reviewed, notable for zoloft     Reviewed imaging on her phone then later when imaging came in, on our computer together    Headache specific questions:  Location (unilateral/whole head/etc): bitemporal or in front  Visual changes--none  Nausea/vomiting: at times   Sensitivity to light/sound: +phonophobia, needs dark quiet room  Liquid intake: not enough  Sleep (including JEANNIE screen)--8 hours  Family history of headaches-- dad's sister  Medication overuse screen  Previous medications tried:  Prophylactic: topamax--not sure, may have stopped due to reflux.    Abortive: imitrex    She lives in [place]  Works as --aide.      Referring note reviewed (Tracy Guallpa, SABRINA):  Assessment & Plan  1. Migraine without aura and without status migrainosus, not intractable  Patient is a 24 year-old female with moderate asthma, migraine headaches, and mild intellectual disability presenting with ongoing migraine headache. Seen in [facility] ED on 8/13/24 for same symptoms. Notes and diagnostics reviewed from encounter. Stopped taking migraine prevention medication (Topamax) and never started migraine abortive medication (Imitrex). Advised to refill both medications and use as prescribed to further prevent and abort migraines. Gave one time dose of IM Toradol while in clinic. Continue with as needed acetaminophen, rest, and ice/heat for pain relief. Discussed proper use of medication(s) and potential side effects. Follow-up if symptoms fail to improve despite above interventions. Patient understands and is agreeable to plan as discussed in clinic.  - ketorolac (TORADOL) injection 30 mg     2. Calcification of basal ganglia (H)  Incidental finding on CT scan, likely unrelated to current migraine headache. Plan for further metabolic work-up as ordered below. Consider referral to endocrinology pending lab results. Referral to neurology for further evaluation " placed.   - Comprehensive metabolic panel (BMP + Alb, Alk Phos, ALT, AST, Total. Bili, TP)  - TSH with free T4 reflex  - Parathyroid Hormone Intact  - Adult Neurology  Referral; Future     3. Abnormal CT of brain  As above.      4. Moderate persistent asthma without complication  Stable, no sign/indication of exacerbation at this time.            Past Medical History:     Patient Active Problem List   Diagnosis    Congenital anomaly of aortic arch    Velocardiofacial syndrome    Mild intellectual disability    H/O: iron deficiency anemia    Attention deficit hyperactivity disorder (ADHD), unspecified ADHD type    Speech articulation disorder    Cutis marmorata    Hearing loss, unspecified hearing loss type, L    H/O vitamin D deficiency    Low calcium levels    Moderate persistent asthma, unspecified whether complicated    Rapid weight gain    JOHN (generalized anxiety disorder)    Elevated TSH    Morbid obesity (H)    Pelvic floor relaxation     Past Medical History:   Diagnosis Date    Closed fracture of shaft of femur (H) 3/01    casted    Congenital anomaly of aortic arch 2000    non-obstructive, cervical Rt aortic arch, median gradient 6 mm HG, stable, last ECHO 11/2011    Menorrhagia with regular cycle 4/5/2016    Mild persistent asthma 4/21/2005    Nonspecific abnormal auditory function studies 2002    mild hearing loss bilaterally, may need hearing aids in the future    Other closed fracture of lower end of humerus 9/04    right elbow intercondylar transverse fx w/ posterior dislocation, treated surgically    Other speech disturbance 3/6/2002    speech therapy at school    Unspecified otitis media     Recurrent otitis    Velocardiofacial syndrome         Past Surgical History:     Past Surgical History:   Procedure Laterality Date    ADENOIDECTOMY  04/11/2006    EXAM UNDER ANESTHESIA EAR(S) Right 1/14/2015    Procedure: EXAM UNDER ANESTHESIA EAR(S);  Surgeon: Maryjo Slade MD;  Location:   OR    [facility] RECONSTRUCTION OF THROAT  07/25/2007, 5/19/08    Pharyngeal flap attachment    REMOVE TUBE, MYRINGOTOMY, INSERT PAPER PATCH, COMBINED Left 1/14/2015    Procedure: COMBINED REMOVE TUBE, MYRINGOTOMY, INSERT PAPER PATCH;  Surgeon: Maryjo Slade MD;  Location:  OR    [facility] ECHO HEART XTHORACIC,COMPLETE, W/O DOPPLER  9/04    right aortic arch    [facility] NONSPECIFIC PROCEDURE  9/04    right elbow fx surgically repaired at [facility] CREATE EARDRUM OPENING,GEN ANESTH  4/2002, 7/25/2007, 5/19/08    P.E. Tubes        Social History:     Social History     Tobacco Use    Smoking status: Never    Smokeless tobacco: Never   Vaping Use    Vaping status: Never Used   Substance Use Topics    Alcohol use: Yes     Comment: occasionally    Drug use: No        Family History:     Family History   Problem Relation Age of Onset    Depression Mother     Hypertension Mother     Diabetes Mother     Hyperlipidemia Mother     Arthritis Mother     Arthritis Paternal Grandmother     Diabetes Maternal Grandmother     Kidney Disease Other         Cousin    Nephrolithiasis Other     Heart Defect Other     Anesthesia Reaction No family hx of         Medications:     Current Outpatient Medications   Medication Sig Dispense Refill    albuterol (PROVENTIL) (2.5 MG/3ML) 0.083% neb solution Take 1 vial (2.5 mg) by nebulization every 4 hours as needed for shortness of breath 225 mL 4    budesonide-formoterol (SYMBICORT) 80-4.5 MCG/ACT Inhaler Inhale 2 puffs twice daily plus 1-2 puffs as needed. May use up to 12 puffs per day. 20.4 g 11    cetirizine (ZYRTEC) 10 MG tablet Take 1 tablet (10 mg) by mouth daily 90 tablet 1    famotidine (PEPCID) 20 MG tablet Take 20 mg by mouth 2 times daily.      fluticasone (FLONASE) 50 MCG/ACT nasal spray Spray 2 sprays in nostril daily.      fluticasone (FLOVENT HFA) 220 MCG/ACT inhaler Inhale 2 puffs into the lungs 2 times daily.      levonorgestrel-ethinyl estradiol (AVIANE) 0.1-20  "MG-MCG tablet Take 1 tablet by mouth daily 84 tablet prn    ondansetron (ZOFRAN ODT) 4 MG ODT tab Take 4 mg by mouth every 8 hours as needed      PROAIR  (90 Base) MCG/ACT inhaler Inhale 2 puffs into the lungs every 4 hours as needed for shortness of breath (cough, wheezing or shortness of breath) 18 g 0    sertraline (ZOLOFT) 100 MG tablet Take 1 tablet (100 mg) by mouth daily. 90 tablet 1    spacer (OPTICHAMBER GA) holding chamber Use with inhaler 1 each 11    SUMAtriptan (IMITREX) 50 MG tablet Take 1 tablet (50 mg) by mouth at onset of headache for migraine May repeat in 2 hours. Max 4 tablets/24 hours. 30 tablet 1     No current facility-administered medications for this visit.        Allergies:     Allergies   Allergen Reactions    No Known Drug Allergy         Review of Systems:   As noted above     Physical Exam:   Vitals: BP (!) 143/93 (BP Location: Right arm, Patient Position: Sitting, Cuff Size: Adult Regular)   Pulse 70   Ht 1.606 m (5' 3.24\")   Wt 101.2 kg (223 lb)   BMI 39.21 kg/m       Neuro:   General Appearance: No apparent distress, well-nourished, well-groomed, pleasant     Mental Status: Alert and oriented to person, place, and time. Speech fluent and comprehension intact. No dysarthria.      Cranial Nerves:   II: Visual fields: normal  III: Pupils: 3 mm, equal, round, reactive to light   III,IV,VI: Extraocular Movements: intact   V: Facial sensation: intact to light touch  VII: Facial strength: intact without asymmetry      Motor Exam:   5/5 diffusely     Sensory: intact to light touch    Coordination: no dysmetria noted    Reflexes: biceps, triceps, brachioradialis, patellar 2+ and symmetric.            Data: Pertinent prior to visit   Imagin.  No acute intracranial process.    2.  There is mineralization of the bilateral basal ganglia that is greater than expected for the patient's age. There is also a small focus of mineralization adjacent to the right frontal horn. " Consider genetic/endocrinological workup for underlying metabolic abnormality.  Narrative    For Patients: As a result of the 21st Century Cures Act, medical imaging exams and procedure reports are released immediately into your electronic medical record. You may view this report before your referring provider. If you have questions, please contact your health care provider.    EXAM: CT HEAD BRAIN WO  LOCATION: [facility]  DATE: 8/13/2024    INDICATION: Headache, chronic, new features or increased frequency  COMPARISON: None.  TECHNIQUE: Routine CT Head without IV contrast. Multiplanar reformats. Dose reduction techniques were used.    FINDINGS:  INTRACRANIAL CONTENTS: There is mineralization of the bilateral basal ganglia a small focus of mineralization adjacent to the right frontal horn. No intracranial hemorrhage, extraaxial collection, or mass effect.  No CT evidence of acute infarct. Normal parenchymal attenuation. Normal ventricles and sulci.    VISUALIZED ORBITS/SINUSES/MASTOIDS: No intraorbital abnormality. No paranasal sinus mucosal disease. No middle ear or mastoid effusion.    BONES/SOFT TISSUES: No acute abnormality.          Laboratory:  Parathyroid 40 (normal)   Last Comprehensive Metabolic Panel:  Lab Results   Component Value Date     08/15/2024    POTASSIUM 4.0 08/15/2024    CHLORIDE 104 08/15/2024    CO2 21 (L) 08/15/2024    ANIONGAP 14 08/15/2024    GLC 83 08/15/2024    BUN 11.6 08/15/2024    CR 0.77 08/15/2024    GFRESTIMATED >90 08/15/2024    LINDA 9.2 08/15/2024                  The total time of this encounter today amounted to 60 minutes. This time included time spent with the patient, prep work, ordering tests, and performing post visit documentation.

## 2024-11-28 NOTE — RESULT ENCOUNTER NOTE
Heather,    Your usual provider is currently out of the clinic.  Your B12 level is low again.  If you have not been taking your B12 vitamins, then please resume.  If you have been taking them, we should consider injectable B12.    Thanks,    Dr. Vick

## 2024-12-13 NOTE — TELEPHONE ENCOUNTER
REASON FOR VISIT: Calcification of basal ganglia (H)    DATE OF APPT: 12/26/2024   NOTES (FOR ALL VISITS) STATUS DETAILS   [facility]   MEDICATION LIST N/A    IMAGING  (FOR ALL VISITS)     XR N/A    MRI (HEAD, NECK, SPINE) N/A    CT (HEAD, NECK, SPINE) N/A

## 2024-12-26 ENCOUNTER — PRE VISIT (OUTPATIENT)
Dept: NEUROLOGY | Facility: CLINIC | Age: 24
End: 2024-12-26

## 2024-12-26 ENCOUNTER — OFFICE VISIT (OUTPATIENT)
Dept: NEUROLOGY | Facility: CLINIC | Age: 24
End: 2024-12-26
Payer: COMMERCIAL

## 2024-12-26 VITALS
BODY MASS INDEX: 39.51 KG/M2 | SYSTOLIC BLOOD PRESSURE: 143 MMHG | DIASTOLIC BLOOD PRESSURE: 93 MMHG | WEIGHT: 223 LBS | HEIGHT: 63 IN | HEART RATE: 70 BPM

## 2024-12-26 DIAGNOSIS — G23.8 CALCIFICATION OF BASAL GANGLIA (H): ICD-10-CM

## 2024-12-26 DIAGNOSIS — Q93.81 VELOCARDIOFACIAL SYNDROME: ICD-10-CM

## 2024-12-26 DIAGNOSIS — G43.009 MIGRAINE WITHOUT AURA AND WITHOUT STATUS MIGRAINOSUS, NOT INTRACTABLE: Primary | ICD-10-CM

## 2024-12-26 RX ORDER — METHYLPHENIDATE HYDROCHLORIDE 27 MG/1
1 TABLET, EXTENDED RELEASE ORAL EVERY MORNING
COMMUNITY

## 2024-12-26 NOTE — PATIENT INSTRUCTIONS
Magnesium oxide 400 mg Riboflavin (vitamin b2) 400 mg daily    The calcifications very likely relate to your Velocardiofacial syndrome, incidental, not causing problems    Imitrex--- take early, can trial 1/2, can take with advil.    If headaches worsen to the point you want to try something daily

## 2024-12-26 NOTE — NURSING NOTE
"Heather Lovett's goals for this visit include:  Chief Complaint   Patient presents with    New Patient     Calcification of basal ganglia       She requests these members of her care team be copied on today's visit information: yes    PCP: Debbie Lewis    Referring Provider:  [facility] Her[facility]  [address] [place] [address]    BP (!) 143/93 (BP Location: Right arm, Patient Position: Sitting, Cuff Size: Adult Regular)   Pulse 70   Ht 1.606 m (5' 3.24\")   Wt 101.2 kg (223 lb)   BMI 39.21 kg/m      Do you need any medication refills at today's visit? No  NBA Thomason., CMA ([facility])      "

## 2024-12-26 NOTE — LETTER
12/26/2024      Heather Lovett  [address] [facility] [address]      Dear Colleague,    Thank you for referring your patient, Heather Lovett, to the [facility]UROLOG[facility] [place]. Please see a copy of my visit note below.    [facility]  Section of General Neurology  New Patient Visit      Heather Lovett MRN# [id number]   Age: 24 year old YOB: 2000              Assessment and Plan:   Assessment:  Heather Lovett is a pleasant 24 year old female who presents today for evaluation of headaches, incidental finding of basal ganglia calcification.  To review her headaches are at times tension headaches, infrequently migraine type headaches as well.  These are mostly well controlled on imitrex.  Discussed options should they worsen, nutritional options.  We reviewed her CT findings.  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  I don't think these are necessarily causing any problems at this time but there also is not a great way to prevent or act on them as it relates to this syndrome.  She has had appropriate work up it would seem with normal parathyroid testing.   Nothing actionable needs to be done in this regard.   Reviewed this imaging together.      Plan:  Magnesium oxide 400 mg Riboflavin (vitamin b2) 400 mg daily  Imitrex--- encouraged to take early, trial 1/2 tab if gets to sleepy on it.   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  Has  "this dx Can relate to  bilateral basal ganglia calcifications--does have this.     Last bad headache in August, couldn't move her head.    Under a lot of stress provokes them .  Highly variably  Imitrex does help  Average 1-2 per month  Knows the difference between \"regular\" and migraine headaches    Medication list reviewed, notable for zoloft     Reviewed imaging on her phone then later when imaging came in, on our computer together    Headache specific questions:  Location (unilateral/whole head/etc): bitemporal or in front  Visual changes--none  Nausea/vomiting: at times   Sensitivity to light/sound: +phonophobia, needs dark quiet room  Liquid intake: not enough  Sleep (including JEANNIE screen)--8 hours  Family history of headaches-- dad's sister  Medication overuse screen  Previous medications tried:  Prophylactic: topamax--not sure, may have stopped due to reflux.    Abortive: imitrex    She lives in [place]  Works as --aide.      Referring note reviewed (Tracy Guallpa, SABRINA):  Assessment & Plan  1. Migraine without aura and without status migrainosus, not intractable  Patient is a 24 year-old female with moderate asthma, migraine headaches, and mild intellectual disability presenting with ongoing migraine headache. Seen in [place] [facility] on 8/13/24 for same symptoms. Notes and diagnostics reviewed from encounter. Stopped taking migraine prevention medication (Topamax) and never started migraine abortive medication (Imitrex). Advised to refill both medications and use as prescribed to further prevent and abort migraines. Gave one time dose of IM Toradol while in clinic. Continue with as needed acetaminophen, rest, and ice/heat for pain relief. Discussed proper use of medication(s) and potential side effects. Follow-up if symptoms fail to improve despite above interventions. Patient understands and is agreeable to plan as discussed in clinic.  - ketorolac (TORADOL) injection 30 mg     2. Calcification " of basal ganglia (H)  Incidental finding on CT scan, likely unrelated to current migraine headache. Plan for further metabolic work-up as ordered below. Consider referral to endocrinology pending lab results. Referral to neurology for further evaluation placed.   - Comprehensive metabolic panel (BMP + Alb, Alk Phos, ALT, AST, Total. Bili, TP)  - TSH with free T4 reflex  - Parathyroid Hormone Intact  - Adult Neurology  Referral; Future     3. Abnormal CT of brain  As above.      4. Moderate persistent asthma without complication  Stable, no sign/indication of exacerbation at this time.            Past Medical History:     Patient Active Problem List   Diagnosis     Congenital anomaly of aortic arch     Velocardiofacial syndrome     Mild intellectual disability     H/O: iron deficiency anemia     Attention deficit hyperactivity disorder (ADHD), unspecified ADHD type     Speech articulation disorder     Cutis marmorata     Hearing loss, unspecified hearing loss type, L     H/O vitamin D deficiency     Low calcium levels     Moderate persistent asthma, unspecified whether complicated     Rapid weight gain     JOHN (generalized anxiety disorder)     Elevated TSH     Morbid obesity (H)     Pelvic floor relaxation     Past Medical History:   Diagnosis Date     Closed fracture of shaft of femur (H) 3/01    casted     Congenital anomaly of aortic arch 2000    non-obstructive, cervical Rt aortic arch, median gradient 6 mm HG, stable, last ECHO 11/2011     Menorrhagia with regular cycle 4/5/2016     Mild persistent asthma 4/21/2005     Nonspecific abnormal auditory function studies 2002    mild hearing loss bilaterally, may need hearing aids in the future     Other closed fracture of lower end of humerus 9/04    right elbow intercondylar transverse fx w/ posterior dislocation, treated surgically     Other speech disturbance 3/6/2002    speech therapy at school     Unspecified otitis media     Recurrent otitis      Velocardiofacial syndrome         Past Surgical History:     Past Surgical History:   Procedure Laterality Date     ADENOIDECTOMY  04/11/2006     EXAM UNDER ANESTHESIA EAR(S) Right 1/14/2015    Procedure: EXAM UNDER ANESTHESIA EAR(S);  Surgeon: Maryjo Slade MD;  Location:  OR      RECONSTRUCTION OF THROAT  07/25/2007, 5/19/08    Pharyngeal flap attachment     REMOVE TUBE, MYRINGOTOMY, INSERT PAPER PATCH, COMBINED Left 1/14/2015    Procedure: COMBINED REMOVE TUBE, MYRINGOTOMY, INSERT PAPER PATCH;  Surgeon: Maryjo Slade MD;  Location:  OR     [facility] ECHO HEART XTHORACIC,COMPLETE, W/O DOPPLER  9/04    right aortic arch     Z NONSPECIFIC PROCEDURE  9/04    right elbow fx surgically repaired at [facility] CREATE EARDRUM OPENING,GEN ANESTH  4/2002, 7/25/2007, 5/19/08    P.E. Tubes        Social History:     Social History     Tobacco Use     Smoking status: Never     Smokeless tobacco: Never   Vaping Use     Vaping status: Never Used   Substance Use Topics     Alcohol use: Yes     Comment: occasionally     Drug use: No        Family History:     Family History   Problem Relation Age of Onset     Depression Mother      Hypertension Mother      Diabetes Mother      Hyperlipidemia Mother      Arthritis Mother      Arthritis Paternal Grandmother      Diabetes Maternal Grandmother      Kidney Disease Other         Cousin     Nephrolithiasis Other      Heart Defect Other      Anesthesia Reaction No family hx of         Medications:     Current Outpatient Medications   Medication Sig Dispense Refill     albuterol (PROVENTIL) (2.5 MG/3ML) 0.083% neb solution Take 1 vial (2.5 mg) by nebulization every 4 hours as needed for shortness of breath 225 mL 4     budesonide-formoterol (SYMBICORT) 80-4.5 MCG/ACT Inhaler Inhale 2 puffs twice daily plus 1-2 puffs as needed. May use up to 12 puffs per day. 20.4 g 11     cetirizine (ZYRTEC) 10 MG tablet Take 1 tablet (10 mg) by mouth daily 90 tablet 1  "    famotidine (PEPCID) 20 MG tablet Take 20 mg by mouth 2 times daily.       fluticasone (FLONASE) 50 MCG/ACT nasal spray Spray 2 sprays in nostril daily.       fluticasone (FLOVENT HFA) 220 MCG/ACT inhaler Inhale 2 puffs into the lungs 2 times daily.       levonorgestrel-ethinyl estradiol (AVIANE) 0.1-20 MG-MCG tablet Take 1 tablet by mouth daily 84 tablet prn     ondansetron (ZOFRAN ODT) 4 MG ODT tab Take 4 mg by mouth every 8 hours as needed       PROAIR  (90 Base) MCG/ACT inhaler Inhale 2 puffs into the lungs every 4 hours as needed for shortness of breath (cough, wheezing or shortness of breath) 18 g 0     sertraline (ZOLOFT) 100 MG tablet Take 1 tablet (100 mg) by mouth daily. 90 tablet 1     spacer (OPTICHAMBER GA) holding chamber Use with inhaler 1 each 11     SUMAtriptan (IMITREX) 50 MG tablet Take 1 tablet (50 mg) by mouth at onset of headache for migraine May repeat in 2 hours. Max 4 tablets/24 hours. 30 tablet 1     No current facility-administered medications for this visit.        Allergies:     Allergies   Allergen Reactions     No Known Drug Allergy         Review of Systems:   As noted above     Physical Exam:   Vitals: BP (!) 143/93 (BP Location: Right arm, Patient Position: Sitting, Cuff Size: Adult Regular)   Pulse 70   Ht 1.606 m (5' 3.24\")   Wt 101.2 kg (223 lb)   BMI 39.21 kg/m       Neuro:   General Appearance: No apparent distress, well-nourished, well-groomed, pleasant     Mental Status: Alert and oriented to person, place, and time. Speech fluent and comprehension intact. No dysarthria.      Cranial Nerves:   II: Visual fields: normal  III: Pupils: 3 mm, equal, round, reactive to light   III,IV,VI: Extraocular Movements: intact   V: Facial sensation: intact to light touch  VII: Facial strength: intact without asymmetry      Motor Exam:   5/5 diffusely     Sensory: intact to light touch    Coordination: no dysmetria noted    Reflexes: biceps, triceps, brachioradialis, " patellar 2+ and symmetric.            Data: Pertinent prior to visit   Imagin.  No acute intracranial process.    2.  There is mineralization of the bilateral basal ganglia that is greater than expected for the patient's age. There is also a small focus of mineralization adjacent to the right frontal horn. Consider genetic/endocrinological workup for underlying metabolic abnormality.  Narrative    For Patients: As a result of the  Cures Act, medical imaging exams and procedure reports are released immediately into your electronic medical record. You may view this report before your referring provider. If you have questions, please contact your health care provider.    EXAM: CT HEAD BRAIN WO  LOCATION: [facility]  DATE: 2024    INDICATION: Headache, chronic, new features or increased frequency  COMPARISON: None.  TECHNIQUE: Routine CT Head without IV contrast. Multiplanar reformats. Dose reduction techniques were used.    FINDINGS:  INTRACRANIAL CONTENTS: There is mineralization of the bilateral basal ganglia a small focus of mineralization adjacent to the right frontal horn. No intracranial hemorrhage, extraaxial collection, or mass effect.  No CT evidence of acute infarct. Normal parenchymal attenuation. Normal ventricles and sulci.    VISUALIZED ORBITS/SINUSES/MASTOIDS: No intraorbital abnormality. No paranasal sinus mucosal disease. No middle ear or mastoid effusion.    BONES/SOFT TISSUES: No acute abnormality.          Laboratory:  Parathyroid 40 (normal)   Last Comprehensive Metabolic Panel:  Lab Results   Component Value Date     08/15/2024    POTASSIUM 4.0 08/15/2024    CHLORIDE 104 08/15/2024    CO2 21 (L) 08/15/2024    ANIONGAP 14 08/15/2024    GLC 83 08/15/2024    BUN 11.6 08/15/2024    CR 0.77 08/15/2024    GFRESTIMATED >90 08/15/2024    LINDA 9.2 08/15/2024                  The total time of this encounter today amounted to 60 minutes. This time included time spent with  the patient, prep work, ordering tests, and performing post visit documentation.          Again, thank you for allowing me to participate in the care of your patient.        Sincerely,        Alberto White MD    Electronically signed

## 2025-01-27 DIAGNOSIS — J30.1 SEASONAL ALLERGIC RHINITIS DUE TO POLLEN: ICD-10-CM

## 2025-01-27 DIAGNOSIS — J45.40 MODERATE PERSISTENT ASTHMA, UNSPECIFIED WHETHER COMPLICATED: ICD-10-CM

## 2025-01-27 RX ORDER — CETIRIZINE HYDROCHLORIDE 10 MG/1
10 TABLET ORAL DAILY
Qty: 90 TABLET | Refills: 2 | Status: SHIPPED | OUTPATIENT
Start: 2025-01-27

## 2025-03-04 ENCOUNTER — NURSE TRIAGE (OUTPATIENT)
Dept: FAMILY MEDICINE | Facility: OTHER | Age: 25
End: 2025-03-04
Payer: COMMERCIAL

## 2025-03-05 NOTE — TELEPHONE ENCOUNTER
Nurse Triage SBAR    Is this a 2nd Level Triage? NO    Situation: Lightheaded yesterday 3/4/25    Background: Patient reports she had an asthma attack yesterday morning, reports she then felt lightheaded later in the day. Symptoms have since resolved.    Assessment: Patient denies symptoms now, reports they resolved last night. She was experiencing lightheadedness yesterday, reports she felt like she was going to pass out from it. Denied shortness of breath, denied chest pain or tightness. She reports her heart may have been racing at the time, she is not entirely sure. Reports she has never felt this way after an asthma attack before. She reports she feels fine today, has no symptoms.     Protocol Recommended Disposition:   See in Office Within 3 Days    Recommendation: Patient to be seen in office within 3 days per protocol, RN assisted patient with scheduling in clinic this week. RN reviewed red flag symptoms with patient and when to see emergency care. They agreed and understood.     Does the patient meet one of the following criteria for ADS visit consideration? 16+ years old, with an [facility] PCP     Isabel Nguyễn RN on 3/5/2025 at 8:48 AM    Reason for Disposition   MILD dizziness (e.g., walking normally) and has NOT been evaluated by physician for this (Exception: Dizziness caused by heat exposure, sudden standing, or poor fluid intake.)    Additional Information   Negative: SEVERE difficulty breathing (e.g., struggling for each breath, speaks in single words)   Negative: Shock suspected (e.g., cold/pale/clammy skin, too weak to stand, low BP, rapid pulse)   Negative: Difficult to awaken or acting confused (e.g., disoriented, slurred speech)   Negative: Fainted, and still feels dizzy afterwards   Negative: Overdose (accidental or intentional) of medications   Negative: New neurologic deficit that is present now: * Weakness of the face, arm, or leg on one side of the body * Numbness of the face, arm, or leg  on one side of the body * Loss of speech or garbled speech   Negative: Heart beating < 50 beats per minute OR > 140 beats per minute   Negative: Sounds like a life-threatening emergency to the triager   Negative: Chest pain   Negative: Rectal bleeding, bloody stool, or tarry-black stool   Negative: Vomiting is main symptom   Negative: Diarrhea is main symptom   Negative: Headache is main symptom   Negative: Heat exhaustion suspected (i.e., dehydration from heat exposure)   Negative: Patient states that they are having an anxiety or panic attack   Negative: Dizziness from low blood sugar (i.e., < 60 mg/dl or 3.5 mmol/l)   Negative: SEVERE dizziness (e.g., unable to stand, requires support to walk, feels like passing out now)   Negative: SEVERE headache or neck pain   Negative: Spinning or tilting sensation (vertigo) present now and one or more stroke risk factors (i.e., hypertension, diabetes mellitus, prior stroke/TIA, heart attack, age over 60) (Exception: Prior physician evaluation for this AND no different/worse than usual.)   Negative: Neurologic deficit that was brief (now gone), ANY of the following:* Weakness of the face, arm, or leg on one side of the body* Numbness of the face, arm, or leg on one side of the body* Loss of speech or garbled speech   Negative: Loss of vision or double vision  (Exception: Similar to previous migraines.)   Negative: Extra heartbeats, irregular heart beating, or heart is beating very fast (i.e., 'palpitations')   Negative: Difficulty breathing   Negative: Drinking very little and dehydration suspected (e.g., no urine > 12 hours, very dry mouth, very lightheaded)   Negative: Follows bleeding (e.g., stomach, rectum, vagina)  (Exception: Became dizzy from sight of small amount blood.)   Negative: Patient sounds very sick or weak to the triager   Negative: Lightheadedness (dizziness) present now, after 2 hours of rest and fluids   Negative: Spinning or tilting sensation (vertigo)  present now   Negative: Fever > 103 F (39.4 C)   Negative: Fever > 100.0 F (37.8 C) and has diabetes mellitus or a weak immune system (e.g., HIV positive, cancer chemotherapy, organ transplant, splenectomy, chronic steroids)   Negative: MODERATE dizziness (e.g., interferes with normal activities)  (Exception: Dizziness caused by heat exposure, sudden standing, or poor fluid intake.)   Negative: Vomiting occurs with dizziness   Negative: Patient wants to be seen   Negative: Taking a medicine that could cause dizziness (e.g., blood pressure medications, diuretics)    Protocols used: Dizziness-A-OH

## 2025-03-06 ENCOUNTER — TRANSFERRED RECORDS (OUTPATIENT)
Dept: HEALTH INFORMATION MANAGEMENT | Facility: CLINIC | Age: 25
End: 2025-03-06
Payer: COMMERCIAL

## 2025-03-12 ENCOUNTER — VIRTUAL VISIT (OUTPATIENT)
Dept: FAMILY MEDICINE | Facility: OTHER | Age: 25
End: 2025-03-12
Payer: COMMERCIAL

## 2025-03-12 DIAGNOSIS — Z86.2 H/O: IRON DEFICIENCY ANEMIA: ICD-10-CM

## 2025-03-12 DIAGNOSIS — E53.8 VITAMIN B12 DEFICIENCY (NON ANEMIC): ICD-10-CM

## 2025-03-12 DIAGNOSIS — K21.00 GASTROESOPHAGEAL REFLUX DISEASE WITH ESOPHAGITIS WITHOUT HEMORRHAGE: ICD-10-CM

## 2025-03-12 DIAGNOSIS — F41.9 ANXIETY: Primary | ICD-10-CM

## 2025-03-12 PROCEDURE — 98005 SYNCH AUDIO-VIDEO EST LOW 20: CPT | Performed by: FAMILY MEDICINE

## 2025-03-12 RX ORDER — FAMOTIDINE 20 MG/1
20 TABLET, FILM COATED ORAL 2 TIMES DAILY
Qty: 180 TABLET | Refills: 1 | Status: SHIPPED | OUTPATIENT
Start: 2025-03-12

## 2025-03-12 RX ORDER — SERTRALINE HYDROCHLORIDE 100 MG/1
100 TABLET, FILM COATED ORAL DAILY
Qty: 90 TABLET | Refills: 1 | Status: SHIPPED | OUTPATIENT
Start: 2025-03-12

## 2025-03-12 ASSESSMENT — ANXIETY QUESTIONNAIRES
GAD7 TOTAL SCORE: 4
4. TROUBLE RELAXING: SEVERAL DAYS
IF YOU CHECKED OFF ANY PROBLEMS ON THIS QUESTIONNAIRE, HOW DIFFICULT HAVE THESE PROBLEMS MADE IT FOR YOU TO DO YOUR WORK, TAKE CARE OF THINGS AT HOME, OR GET ALONG WITH OTHER PEOPLE: SOMEWHAT DIFFICULT
GAD7 TOTAL SCORE: 4
5. BEING SO RESTLESS THAT IT IS HARD TO SIT STILL: NOT AT ALL
7. FEELING AFRAID AS IF SOMETHING AWFUL MIGHT HAPPEN: NOT AT ALL
3. WORRYING TOO MUCH ABOUT DIFFERENT THINGS: SEVERAL DAYS
GAD7 TOTAL SCORE: 4
7. FEELING AFRAID AS IF SOMETHING AWFUL MIGHT HAPPEN: NOT AT ALL
1. FEELING NERVOUS, ANXIOUS, OR ON EDGE: SEVERAL DAYS
8. IF YOU CHECKED OFF ANY PROBLEMS, HOW DIFFICULT HAVE THESE MADE IT FOR YOU TO DO YOUR WORK, TAKE CARE OF THINGS AT HOME, OR GET ALONG WITH OTHER PEOPLE?: SOMEWHAT DIFFICULT
2. NOT BEING ABLE TO STOP OR CONTROL WORRYING: SEVERAL DAYS
6. BECOMING EASILY ANNOYED OR IRRITABLE: NOT AT ALL

## 2025-03-12 ASSESSMENT — PATIENT HEALTH QUESTIONNAIRE - PHQ9
10. IF YOU CHECKED OFF ANY PROBLEMS, HOW DIFFICULT HAVE THESE PROBLEMS MADE IT FOR YOU TO DO YOUR WORK, TAKE CARE OF THINGS AT HOME, OR GET ALONG WITH OTHER PEOPLE: VERY DIFFICULT
SUM OF ALL RESPONSES TO PHQ QUESTIONS 1-9: 7
SUM OF ALL RESPONSES TO PHQ QUESTIONS 1-9: 7

## 2025-03-12 NOTE — PROGRESS NOTES
Heather is a 25 year old who is being evaluated via a billable video visit.    How would you like to obtain your AVS? MyChart  If the video visit is dropped, the invitation should be resent by: Text to cell phone: [phone]  Will anyone else be joining your video visit? No      Assessment & Plan     Anxiety  - Anxiety may be influenced by recent prednisone use, which can cause increased anxiety and poor sleep. Improvement in mood noted with current medication.  - Continue current sertraline dosage. Monitor symptoms.    Vitamin B12 deficiency (non anemic)  - Previous low B12 levels, replacement therapy initiated.  - Order B12 level labs to assess changes.    H/O: iron deficiency anemia  - Hemoglobin levels currently normal. Iron stores not assessed recently.  - Order iron level labs. Consider dietary adjustments with iron-rich foods to avoid constipation.  - Risks and side effects: Iron supplementation can cause constipation.    Gastroesophageal reflux disease with esophagitis without hemorrhage  - Reflux symptoms potentially exacerbated by lifestyle factors.  - Prescribe Pepcid. Provide lifestyle modification information. Encourage smaller meals, physical activity, and avoiding symptom-triggering foods.    Consent was obtained from the patient to use an AI documentation tool in the creation of this note.                Subjective   Heather is a 25 year old, presenting for the following health issues:  No chief complaint on file.      Video Start Time: 1:04 PM    History of Present Illness       Reason for visit:  Acid reflux    She eats 0-1 servings of fruits and vegetables daily.She consumes 1 sweetened beverage(s) daily.She exercises with enough effort to increase her heart rate 9 or less minutes per day.  She exercises with enough effort to increase her heart rate 3 or less days per week.   She is taking medications regularly.   - Experiencing daily acid reflux triggered by certain situations and lifestyle  factors  - Recent dizziness led to urgent care visit; hemoglobin was normal  - Previous low hemoglobin levels, recent improvement noted  - Recent acute febrile illness with influenza-like symptoms last month; treated with inhalers, no steroids reported  - Experiencing fatigue despite adequate sleep, possibly related to recent medication changes  - Recent asthma symptoms, no steroids reported for treatment    Acid reflux is happening daily but has some pauses.   Been gaining a little weight  Going part time - will have Mondays off - hearing aids.      Review of Systems  Constitutional, HEENT, cardiovascular, pulmonary, GI, , musculoskeletal, neuro, skin, endocrine and psych systems are negative, except as otherwise noted.      Objective           Vitals:  No vitals were obtained today due to virtual visit.    Physical Exam   GENERAL: alert and no distress  EYES: Eyes grossly normal to inspection.  No discharge or erythema, or obvious scleral/conjunctival abnormalities.  RESP: No audible wheeze, cough, or visible cyanosis.    SKIN: Visible skin clear. No significant rash, abnormal pigmentation or lesions.  NEURO: Cranial nerves grossly intact.  Mentation and speech appropriate for age.  PSYCH: Appropriate affect, tone, and pace of words          Video-Visit Details    Type of service:  Video Visit   Video End Time:1:18 PM  Originating Location (pt. Location): Home    Distant Location (provider location):  On-site  Platform used for Video Visit: Joseph  Signed Electronically by: Debbie Lewis MD, MD

## 2025-03-19 ENCOUNTER — TELEPHONE (OUTPATIENT)
Dept: FAMILY MEDICINE | Facility: OTHER | Age: 25
End: 2025-03-19

## 2025-03-19 ENCOUNTER — LAB (OUTPATIENT)
Dept: LAB | Facility: OTHER | Age: 25
End: 2025-03-19
Payer: COMMERCIAL

## 2025-03-19 DIAGNOSIS — Z86.2 H/O: IRON DEFICIENCY ANEMIA: ICD-10-CM

## 2025-03-19 DIAGNOSIS — E53.8 VITAMIN B12 DEFICIENCY (NON ANEMIC): ICD-10-CM

## 2025-03-19 LAB
IRON BINDING CAPACITY (ROCHE): 255 UG/DL (ref 240–430)
IRON SATN MFR SERPL: 30 % (ref 15–46)
IRON SERPL-MCNC: 76 UG/DL (ref 37–145)

## 2025-03-19 PROCEDURE — 36415 COLL VENOUS BLD VENIPUNCTURE: CPT

## 2025-03-19 NOTE — TELEPHONE ENCOUNTER
Reason for Call:  Appointment Request    Patient requesting this type of appt:  ear pain    Requested provider:  anyone available    Reason patient unable to be scheduled: Not within requested timeframe    When does patient want to be seen/preferred time: Same day    Comments: Pt will be in for lab at 11:45 today and was wondering if she could be seen for ear pain around the same time.  Could we send this information to you in [employer] or would you prefer to receive a phone call?:   Patient would like to be contacted via [employer]    Call taken on 3/19/2025 at 9:54 AM by Debbie Espinoza

## 2025-03-20 LAB — VIT B12 SERPL-MCNC: 282 PG/ML (ref 232–1245)

## 2025-05-19 ENCOUNTER — OFFICE VISIT (OUTPATIENT)
Dept: FAMILY MEDICINE | Facility: OTHER | Age: 25
End: 2025-05-19
Attending: FAMILY MEDICINE
Payer: COMMERCIAL

## 2025-05-19 ENCOUNTER — RESULTS FOLLOW-UP (OUTPATIENT)
Dept: FAMILY MEDICINE | Facility: OTHER | Age: 25
End: 2025-05-19

## 2025-05-19 VITALS
HEART RATE: 91 BPM | RESPIRATION RATE: 18 BRPM | TEMPERATURE: 98.8 F | HEIGHT: 63 IN | DIASTOLIC BLOOD PRESSURE: 82 MMHG | SYSTOLIC BLOOD PRESSURE: 115 MMHG | BODY MASS INDEX: 42.17 KG/M2 | WEIGHT: 238 LBS | OXYGEN SATURATION: 100 %

## 2025-05-19 DIAGNOSIS — R53.83 OTHER FATIGUE: ICD-10-CM

## 2025-05-19 DIAGNOSIS — Z00.00 ROUTINE GENERAL MEDICAL EXAMINATION AT A HEALTH CARE FACILITY: Primary | ICD-10-CM

## 2025-05-19 DIAGNOSIS — J30.1 SEASONAL ALLERGIC RHINITIS DUE TO POLLEN: ICD-10-CM

## 2025-05-19 DIAGNOSIS — J45.31 MILD PERSISTENT ASTHMA WITH EXACERBATION: ICD-10-CM

## 2025-05-19 DIAGNOSIS — N81.89 PELVIC FLOOR RELAXATION: ICD-10-CM

## 2025-05-19 DIAGNOSIS — G43.009 MIGRAINE WITHOUT AURA AND WITHOUT STATUS MIGRAINOSUS, NOT INTRACTABLE: ICD-10-CM

## 2025-05-19 DIAGNOSIS — Z12.4 CERVICAL CANCER SCREENING: ICD-10-CM

## 2025-05-19 LAB
CHOLEST SERPL-MCNC: 173 MG/DL
ERYTHROCYTE [DISTWIDTH] IN BLOOD BY AUTOMATED COUNT: 13.4 % (ref 10–15)
EST. AVERAGE GLUCOSE BLD GHB EST-MCNC: 103 MG/DL
FASTING STATUS PATIENT QL REPORTED: NO
HBA1C MFR BLD: 5.2 % (ref 0–5.6)
HCT VFR BLD AUTO: 39.3 % (ref 35–47)
HDLC SERPL-MCNC: 67 MG/DL
HGB BLD-MCNC: 12.9 G/DL (ref 11.7–15.7)
IRON BINDING CAPACITY (ROCHE): 283 UG/DL (ref 240–430)
IRON SATN MFR SERPL: 16 % (ref 15–46)
IRON SERPL-MCNC: 46 UG/DL (ref 37–145)
LDLC SERPL CALC-MCNC: 75 MG/DL
MCH RBC QN AUTO: 29.9 PG (ref 26.5–33)
MCHC RBC AUTO-ENTMCNC: 32.8 G/DL (ref 31.5–36.5)
MCV RBC AUTO: 91 FL (ref 78–100)
NONHDLC SERPL-MCNC: 106 MG/DL
PLATELET # BLD AUTO: 170 10E3/UL (ref 150–450)
RBC # BLD AUTO: 4.32 10E6/UL (ref 3.8–5.2)
TRIGL SERPL-MCNC: 153 MG/DL
TSH SERPL DL<=0.005 MIU/L-ACNC: 3.1 UIU/ML (ref 0.3–4.2)
WBC # BLD AUTO: 7.1 10E3/UL (ref 4–11)

## 2025-05-19 PROCEDURE — 83036 HEMOGLOBIN GLYCOSYLATED A1C: CPT | Performed by: FAMILY MEDICINE

## 2025-05-19 PROCEDURE — 82607 VITAMIN B-12: CPT | Performed by: FAMILY MEDICINE

## 2025-05-19 PROCEDURE — 1125F AMNT PAIN NOTED PAIN PRSNT: CPT | Performed by: FAMILY MEDICINE

## 2025-05-19 PROCEDURE — 99214 OFFICE O/P EST MOD 30 MIN: CPT | Mod: 25 | Performed by: FAMILY MEDICINE

## 2025-05-19 PROCEDURE — 99395 PREV VISIT EST AGE 18-39: CPT | Performed by: FAMILY MEDICINE

## 2025-05-19 PROCEDURE — 85027 COMPLETE CBC AUTOMATED: CPT | Performed by: FAMILY MEDICINE

## 2025-05-19 PROCEDURE — 83550 IRON BINDING TEST: CPT | Performed by: FAMILY MEDICINE

## 2025-05-19 PROCEDURE — 83540 ASSAY OF IRON: CPT | Performed by: FAMILY MEDICINE

## 2025-05-19 PROCEDURE — 36415 COLL VENOUS BLD VENIPUNCTURE: CPT | Performed by: FAMILY MEDICINE

## 2025-05-19 PROCEDURE — 3074F SYST BP LT 130 MM HG: CPT | Performed by: FAMILY MEDICINE

## 2025-05-19 PROCEDURE — 80061 LIPID PANEL: CPT | Performed by: FAMILY MEDICINE

## 2025-05-19 PROCEDURE — 84443 ASSAY THYROID STIM HORMONE: CPT | Performed by: FAMILY MEDICINE

## 2025-05-19 PROCEDURE — G0145 SCR C/V CYTO,THINLAYER,RESCR: HCPCS | Performed by: FAMILY MEDICINE

## 2025-05-19 PROCEDURE — 3079F DIAST BP 80-89 MM HG: CPT | Performed by: FAMILY MEDICINE

## 2025-05-19 RX ORDER — ALBUTEROL SULFATE 90 UG/1
2 AEROSOL, METERED RESPIRATORY (INHALATION) EVERY 4 HOURS PRN
Qty: 18 G | Refills: 0 | Status: SHIPPED | OUTPATIENT
Start: 2025-05-19

## 2025-05-19 RX ORDER — LEVONORGESTREL/ETHIN.ESTRADIOL 0.1-0.02MG
1 TABLET ORAL DAILY
Qty: 84 TABLET | Status: SHIPPED | OUTPATIENT
Start: 2025-05-19

## 2025-05-19 RX ORDER — MONTELUKAST SODIUM 10 MG/1
10 TABLET ORAL AT BEDTIME
Qty: 90 TABLET | Refills: 1 | Status: SHIPPED | OUTPATIENT
Start: 2025-05-19

## 2025-05-19 RX ORDER — SUMATRIPTAN 50 MG/1
50 TABLET, FILM COATED ORAL
Qty: 30 TABLET | Refills: 1 | Status: SHIPPED | OUTPATIENT
Start: 2025-05-19

## 2025-05-19 RX ORDER — BUDESONIDE AND FORMOTEROL FUMARATE DIHYDRATE 80; 4.5 UG/1; UG/1
AEROSOL RESPIRATORY (INHALATION)
Qty: 20.4 G | Refills: 11 | Status: SHIPPED | OUTPATIENT
Start: 2025-05-19

## 2025-05-19 RX ORDER — ALBUTEROL SULFATE 0.83 MG/ML
2.5 SOLUTION RESPIRATORY (INHALATION) EVERY 4 HOURS PRN
Qty: 225 ML | Refills: 4 | Status: SHIPPED | OUTPATIENT
Start: 2025-05-19

## 2025-05-19 SDOH — HEALTH STABILITY: PHYSICAL HEALTH: ON AVERAGE, HOW MANY DAYS PER WEEK DO YOU ENGAGE IN MODERATE TO STRENUOUS EXERCISE (LIKE A BRISK WALK)?: 0 DAYS

## 2025-05-19 ASSESSMENT — ANXIETY QUESTIONNAIRES
6. BECOMING EASILY ANNOYED OR IRRITABLE: SEVERAL DAYS
2. NOT BEING ABLE TO STOP OR CONTROL WORRYING: SEVERAL DAYS
7. FEELING AFRAID AS IF SOMETHING AWFUL MIGHT HAPPEN: NOT AT ALL
IF YOU CHECKED OFF ANY PROBLEMS ON THIS QUESTIONNAIRE, HOW DIFFICULT HAVE THESE PROBLEMS MADE IT FOR YOU TO DO YOUR WORK, TAKE CARE OF THINGS AT HOME, OR GET ALONG WITH OTHER PEOPLE: SOMEWHAT DIFFICULT
GAD7 TOTAL SCORE: 6
1. FEELING NERVOUS, ANXIOUS, OR ON EDGE: SEVERAL DAYS
3. WORRYING TOO MUCH ABOUT DIFFERENT THINGS: SEVERAL DAYS
GAD7 TOTAL SCORE: 6
GAD7 TOTAL SCORE: 6
7. FEELING AFRAID AS IF SOMETHING AWFUL MIGHT HAPPEN: NOT AT ALL
4. TROUBLE RELAXING: SEVERAL DAYS
5. BEING SO RESTLESS THAT IT IS HARD TO SIT STILL: SEVERAL DAYS
8. IF YOU CHECKED OFF ANY PROBLEMS, HOW DIFFICULT HAVE THESE MADE IT FOR YOU TO DO YOUR WORK, TAKE CARE OF THINGS AT HOME, OR GET ALONG WITH OTHER PEOPLE?: SOMEWHAT DIFFICULT

## 2025-05-19 ASSESSMENT — ASTHMA QUESTIONNAIRES
QUESTION_4 LAST FOUR WEEKS HOW OFTEN HAVE YOU USED YOUR RESCUE INHALER OR NEBULIZER MEDICATION (SUCH AS ALBUTEROL): TWO OR THREE TIMES PER WEEK
QUESTION_5 LAST FOUR WEEKS HOW WOULD YOU RATE YOUR ASTHMA CONTROL: SOMEWHAT CONTROLLED
QUESTION_1 LAST FOUR WEEKS HOW MUCH OF THE TIME DID YOUR ASTHMA KEEP YOU FROM GETTING AS MUCH DONE AT WORK, SCHOOL OR AT HOME: SOME OF THE TIME
QUESTION_2 LAST FOUR WEEKS HOW OFTEN HAVE YOU HAD SHORTNESS OF BREATH: ONCE OR TWICE A WEEK
ACT_TOTALSCORE: 16
EMERGENCY_ROOM_LAST_YEAR_TOTAL: ONE
ACT_TOTALSCORE: 16
QUESTION_3 LAST FOUR WEEKS HOW OFTEN DID YOUR ASTHMA SYMPTOMS (WHEEZING, COUGHING, SHORTNESS OF BREATH, CHEST TIGHTNESS OR PAIN) WAKE YOU UP AT NIGHT OR EARLIER THAN USUAL IN THE MORNING: ONCE A WEEK

## 2025-05-19 ASSESSMENT — PATIENT HEALTH QUESTIONNAIRE - PHQ9
10. IF YOU CHECKED OFF ANY PROBLEMS, HOW DIFFICULT HAVE THESE PROBLEMS MADE IT FOR YOU TO DO YOUR WORK, TAKE CARE OF THINGS AT HOME, OR GET ALONG WITH OTHER PEOPLE: VERY DIFFICULT
SUM OF ALL RESPONSES TO PHQ QUESTIONS 1-9: 12
SUM OF ALL RESPONSES TO PHQ QUESTIONS 1-9: 12

## 2025-05-19 ASSESSMENT — PAIN SCALES - GENERAL: PAINLEVEL_OUTOF10: MILD PAIN (2)

## 2025-05-19 ASSESSMENT — SOCIAL DETERMINANTS OF HEALTH (SDOH): HOW OFTEN DO YOU GET TOGETHER WITH FRIENDS OR RELATIVES?: THREE TIMES A WEEK

## 2025-05-19 NOTE — PATIENT INSTRUCTIONS
Patient Education   Preventive Care Advice   This is general advice given by our system to help you stay healthy. However, your care team may have specific advice just for you. Please talk to your care team about your preventive care needs.  Nutrition  Eat 5 or more servings of fruits and vegetables each day.  Try wheat bread, brown rice and whole grain pasta (instead of white bread, rice, and pasta).  Get enough calcium and vitamin D. Check the label on foods and aim for 100% of the RDA (recommended daily allowance).  Lifestyle  Exercise at least 150 minutes each week  (30 minutes a day, 5 days a week).  Do muscle strengthening activities 2 days a week. These help control your weight and prevent disease.  No smoking.  Wear sunscreen to prevent skin cancer.  Have a dental exam and cleaning every 6 months.  Yearly exams  See your health care team every year to talk about:  Any changes in your health.  Any medicines your care team has prescribed.  Preventive care, family planning, and ways to prevent chronic diseases.  Shots (vaccines)   HPV shots (up to age 26), if you've never had them before.  Hepatitis B shots (up to age 59), if you've never had them before.  COVID-19 shot: Get this shot when it's due.  Flu shot: Get a flu shot every year.  Tetanus shot: Get a tetanus shot every 10 years.  Pneumococcal, hepatitis A, and RSV shots: Ask your care team if you need these based on your risk.  Shingles shot (for age 50 and up)  General health tests  Diabetes screening:  Starting at age 35, Get screened for diabetes at least every 3 years.  If you are younger than age 35, ask your care team if you should be screened for diabetes.  Cholesterol test: At age 39, start having a cholesterol test every 5 years, or more often if advised.  Bone density scan (DEXA): At age 50, ask your care team if you should have this scan for osteoporosis (brittle bones).  Hepatitis C: Get tested at least once in your life.  STIs (sexually  transmitted infections)  Before age 24: Ask your care team if you should be screened for STIs.  After age 24: Get screened for STIs if you're at risk. You are at risk for STIs (including HIV) if:  You are sexually active with more than one person.  You don't use condoms every time.  You or a partner was diagnosed with a sexually transmitted infection.  If you are at risk for HIV, ask about PrEP medicine to prevent HIV.  Get tested for HIV at least once in your life, whether you are at risk for HIV or not.  Cancer screening tests  Cervical cancer screening: If you have a cervix, begin getting regular cervical cancer screening tests starting at age 21.  Breast cancer scan (mammogram): If you've ever had breasts, begin having regular mammograms starting at age 40. This is a scan to check for breast cancer.  Colon cancer screening: It is important to start screening for colon cancer at age 45.  Have a colonoscopy test every 10 years (or more often if you're at risk) Or, ask your provider about stool tests like a FIT test every year or Cologuard test every 3 years.  To learn more about your testing options, visit:   .  For help making a decision, visit:   https://bit.ly/en30192.  Prostate cancer screening test: If you have a prostate, ask your care team if a prostate cancer screening test (PSA) at age 55 is right for you.  Lung cancer screening: If you are a current or former smoker ages 50 to 80, ask your care team if ongoing lung cancer screenings are right for you.  For informational purposes only. Not to replace the advice of your health care provider. Copyright   2023 [facility]. All rights reserved. Clinically reviewed by the [facility] Transitions Program. [employer] 803897 - REV 01/24.  Learning About Depression Screening  What is depression screening?  Depression screening is a way to see if you have depression symptoms. It may be done by a doctor or counselor. It's often part of a routine  "checkup. That's because your mental health is just as important as your physical health.  Depression is a mental health condition that affects how you feel, think, and act. You may:  Have less energy.  Lose interest in your daily activities.  Feel sad and grouchy for a long time.  Depression is very common. It affects people of all ages.  Many things can lead to depression. Some people become depressed after they have a stroke or find out they have a major illness like cancer or heart disease. The death of a loved one or a breakup may lead to depression. It can run in families. Most experts believe that a combination of inherited genes and stressful life events can cause it.  What happens during screening?  You may be asked to fill out a form about your depression symptoms. You and the doctor will discuss your answers. The doctor may ask you more questions to learn more about how you think, act, and feel.  What happens after screening?  If you have symptoms of depression, your doctor will talk to you about your options.  Doctors usually treat depression with medicines or counseling. Often, combining the two works best. Many people don't get help because they think that they'll get over the depression on their own. But people with depression may not get better unless they get treatment.  The cause of depression is not well understood. There may be many factors involved. But if you have depression, it's not your fault.  A serious symptom of depression is thinking about death or suicide. If you or someone you care about talks about this or about feeling hopeless, get help right away.  It's important to know that depression can be treated. Medicine, counseling, and self-care may help.  Where can you learn more?  Go to https://www.healthwise.net/patiented  Enter T185 in the search box to learn more about \"Learning About Depression Screening.\"  Current as of: July 31, 2024  Content Version: 14.4    [id number] Brandon " [email or web address].   Care instructions adapted under license by your healthcare professional. If you have questions about a medical condition or this instruction, always ask your healthcare professional. [facility] [email or web address] disclaims any warranty or liability for your use of this information.

## 2025-05-19 NOTE — PROGRESS NOTES
Answers submitted by the patient for this visit:  Patient Health Questionnaire (Submitted on 5/19/2025)  If you checked off any problems, how difficult have these problems made it for you to do your work, take care of things at home, or get along with other people?: Very difficult  PHQ9 TOTAL SCORE: 12  Patient Health Questionnaire (G7) (Submitted on 5/19/2025)  JOHN 7 TOTAL SCORE: 6  Preventive Care Visit  [facility]  Debbie Lewis MD, MD, Family Medicine  May 19, 2025      Assessment & Plan         ICD-10-CM    1. Routine general medical examination at a health care facility  Z00.00 levonorgestrel-ethinyl estradiol (AVIANE) 0.1-20 MG-MCG tablet      2. Pelvic floor relaxation  N81.89 Physical Therapy  Referral      3. Migraine without aura and without status migrainosus, not intractable  G43.009 SUMAtriptan (IMITREX) 50 MG tablet      4. Mild persistent asthma with exacerbation  J45.31 budesonide-formoterol (SYMBICORT/BREYNA) 80-4.5 MCG/ACT Inhaler     PROAIR  (90 Base) MCG/ACT inhaler     albuterol (PROVENTIL) (2.5 MG/3ML) 0.083% neb solution     montelukast (SINGULAIR) 10 MG tablet      5. Other fatigue  R53.83 Hemoglobin A1c     TSH with free T4 reflex     CBC with platelets     Vitamin B12     Lipid panel reflex to direct LDL Fasting     Iron and iron binding capacity     Hemoglobin A1c     TSH with free T4 reflex     CBC with platelets     Vitamin B12     Lipid panel reflex to direct LDL Fasting     Iron and iron binding capacity      6. Cervical cancer screening  Z12.4 Pap Screen Reflex to HPV if ASCUS - Recommended Age 25 - 29 Years          Consent was obtained from the patient to use an AI documentation tool in the creation of this note.    Assessment & Plan  Pelvic floor relaxation:  - Difficulty with pelvic floor stretching due to past traumatic experience.  - Referral for physical therapy for pelvic dysfunction to improve muscle flexibility and reduce  "discomfort.    Cervical cancer screening:  - Due for a Pap smear.  - Perform Pap smear during the visit to ensure timely cervical cancer screening.    Migraine without aura and without status migrainosus, not intractable:  - Headaches possibly related to allergies, with previous use of Imitrex and over-the-counter tension headache relief.  - Offer to provide more Imitrex if needed. Discussed potential use of Singulair for allergy-related headaches to reduce frequency and severity.    Mild persistent asthma with exacerbation:  - Asthma exacerbated by allergies.  - Prescribe Singulair to help manage allergy-induced asthma symptoms. Continue using Symbicort, ProAir, and nebulized albuterol. Schedule ACT follow-up in 1 month to assess asthma control with new medication.  - Risks and side effects: Discussed potential bladder retention with increased Zyrtec use and advised monitoring for any urinary issues.    Other fatigue:  - Fatigue potentially related to allergies and headaches.  - Conduct labs including B12 and iron levels to assess causes of fatigue. Consider allergy testing scheduled for June 2, 2025, to identify specific allergens and adjust treatment accordingly.      No LOS data to display   Time spent by me today doing chart review, history and exam, documentation and further activities per the note    Debbie Lewis MD     Patient has been advised of split billing requirements and indicates understanding: Yes        BMI  Estimated body mass index is 42.17 kg/m  as calculated from the following:    Height as of this encounter: 1.6 m (5' 2.99\").    Weight as of this encounter: 108 kg (238 lb).   Weight management plan: Discussed healthy diet and exercise guidelines    Depression Screening Follow Up        5/19/2025     1:39 PM   PHQ   PHQ-9 Total Score 12    Q9: Thoughts of better off dead/self-harm past 2 weeks Not at all       Patient-reported           Follow Up Actions Taken  Crisis resource information provided " in After Visit Summary     Counseling  Appropriate preventive services were addressed with this patient via screening, questionnaire, or discussion as appropriate for fall prevention, nutrition, physical activity, Tobacco-use cessation, social engagement, weight loss and cognition.  Checklist reviewing preventive services available has been given to the patient.  Reviewed patient's diet, addressing concerns and/or questions.   The patient was instructed to see the dentist every 6 months.   The patient's PHQ-9 score is consistent with moderate depression. She was provided with information regarding depression.       Follow-up    Follow-up Visit   Expected date:  May 19, 2026 (Approximate)      Follow Up Appointment Details:     Follow-up with whom?: PCP    Follow-Up for what?: Adult Preventive    How?: In Person                 Subjective   Heather is a 25 year old, presenting for the following:  Physical        5/19/2025     1:46 PM   Additional Questions   Roomed by harmony   Accompanied by self          HPI  - Has not had a pap smear before.  - Headaches have been more constant lately, previously located in the back of the head before allergies.  - Unable to find regular headache medication, using over-the-counter tension headache relief.  - Previously used Imitrex for headaches.  - Stopped taking Topamax due to nausea.  - Experiencing increased depression, feeling tired and lacking motivation.  - Asthma symptoms have worsened with allergies.  - Experiencing frequent ear infections, feels like a cold but without fever.  - Recently discovered an allergy to aloe vera, causing red eyes and burning sinuses.        Advance Care Planning    Discussed advance care planning with patient; informed AVS has link to [employer] [employer].        5/19/2025   General Health   How would you rate your overall physical health? (!) FAIR   Feel stress (tense, anxious, or unable to sleep) Only a little   (!) STRESS CONCERN      5/19/2025    Nutrition   Three or more servings of calcium each day? (!) NO   Diet: I don't know   How many servings of fruit and vegetables per day? (!) 0-1   How many sweetened beverages each day? 0-1         5/19/2025   Exercise   Days per week of moderate/strenous exercise 0 days   (!) EXERCISE CONCERN      5/19/2025   Social Factors   Frequency of gathering with friends or relatives Three times a week   Worry food won't last until get money to buy more No   Food not last or not have enough money for food? No   Do you have housing? (Housing is defined as stable permanent housing and does not include staying outside in a car, in a tent, in an abandoned building, in an overnight shelter, or couch-surfing.) Yes   Are you worried about losing your housing? No   Lack of transportation? No   Unable to get utilities (heat,electricity)? No         5/19/2025   Dental   Dentist two times every year? (!) NO       Today's PHQ-9 Score:       5/19/2025     1:39 PM   PHQ-9 SCORE   PHQ-9 Total Score MyChart 12 (Moderate depression)   PHQ-9 Total Score 12        Patient-reported         5/19/2025   Substance Use   Alcohol more than 3/day or more than 7/wk No   Do you use any other substances recreationally? No     Social History     Tobacco Use    Smoking status: Never    Smokeless tobacco: Never   Vaping Use    Vaping status: Never Used   Substance Use Topics    Alcohol use: Yes     Comment: occasionally    Drug use: No           9/1/2023   LAST FHS-7 RESULTS   1st degree relative breast or ovarian cancer No   Any relative bilateral breast cancer No   Any male have breast cancer No   Any ONE woman have BOTH breast AND ovarian cancer Unknown   Any woman with breast cancer before 50yrs No   2 or more relatives with breast AND/OR ovarian cancer No   2 or more relatives with breast AND/OR bowel cancer Unknown        Mammogram Screening - Patient under 40 years of age: Routine Mammogram Screening not recommended.         5/19/2025   STI  "Screening   New sexual partner(s) since last STI/HIV test? (!) DECLINE     History of abnormal Pap smear: No - age 21-29 PAP every 3 years recommended        7/20/2022     6:02 PM   PAP / HPV   PAP Negative for Intraepithelial Lesion or Malignancy (NILM)            5/19/2025   Contraception/Family Planning   Questions about contraception or family planning (!) DECLINE        Reviewed and updated as needed this visit by Provider   Tobacco  Allergies  Meds  Problems  Med Hx  Surg Hx  Fam Hx                  Review of Systems  Constitutional, HEENT, cardiovascular, pulmonary, GI, , musculoskeletal, neuro, skin, endocrine and psych systems are negative, except as otherwise noted.     Objective    Exam  /82   Pulse 91   Temp 98.8  F (37.1  C) (Temporal)   Resp 18   Ht 1.6 m (5' 2.99\")   Wt 108 kg (238 lb)   LMP 04/29/2025 (Exact Date)   SpO2 100%   BMI 42.17 kg/m     Estimated body mass index is 42.17 kg/m  as calculated from the following:    Height as of this encounter: 1.6 m (5' 2.99\").    Weight as of this encounter: 108 kg (238 lb).    Physical Exam  GENERAL: alert and no distress  EYES: Eyes grossly normal to inspection, PERRL and conjunctivae and sclerae normal  HENT: ear canals and TM's normal, nose and mouth without ulcers or lesions  NECK: no adenopathy, no asymmetry, masses, or scars  RESP: lungs clear to auscultation - no rales, rhonchi or wheezes  CV: regular rate and rhythm, normal S1 S2, no S3 or S4, no murmur, click or rub, no peripheral edema  ABDOMEN: soft, nontender, no hepatosplenomegaly, no masses and bowel sounds normal   (female) w/bimanual: normal female external genitalia, normal urethral meatus, normal vaginal mucosa, and normal cervix/adnexa/uterus without masses or discharge  MS: no gross musculoskeletal defects noted, no edema  SKIN: no suspicious lesions or rashes  NEURO: Normal strength and tone, mentation intact and speech normal  PSYCH: mentation appears normal, " affect normal/bright        Signed Electronically by: Debbie Lewis MD, MD

## 2025-05-19 NOTE — LETTER
My Depression Action Plan  Name: Heather Lovett   Date of Birth 2000  Date: 5/19/2025    My doctor: Debbie Lewis   My clinic: [address]  [facility] [address]  [phone]            GREEN    ZONE   Good Control    What it looks like:   Things are going generally well. You have normal ups and downs. You may even feel depressed from time to time, but bad moods usually last less than a day.   What you need to do:  Continue to care for yourself (see self care plan)  Check your depression survival kit and update it as needed  Follow your physician s recommendations including any medication.  Do not stop taking medication unless you consult with your physician first.             YELLOW         ZONE Getting Worse    What it looks like:   Depression is starting to interfere with your life.   It may be hard to get out of bed; you may be starting to isolate yourself from others.  Symptoms of depression are starting to last most all day and this has happened for several days.   You may have suicidal thoughts but they are not constant.   What you need to do:     Call your care team. Your response to treatment will improve if you keep your care team informed of your progress. Yellow periods are signs an adjustment may need to be made.     Continue your self-care.  Just get dressed and ready for the day.  Don't give yourself time to talk yourself out of it.    Talk to someone in your support network.    Open up your Depression Self-Care Plan/Wellness Kit.             RED    ZONE Medical Alert - Get Help    What it looks like:   Depression is seriously interfering with your life.   You may experience these or other symptoms: You can t get out of bed most days, can t work or engage in other necessary activities, you have trouble taking care of basic hygiene, or basic responsibilities, thoughts of suicide or death that will not go away, self-injurious  behavior.     What you need to do:  Call your care team and request a same-day appointment. If they are not available (weekends or after hours) call your local crisis line, emergency room or 911.          Depression Self-Care Plan / Wellness Kit    Many people find that medication and therapy are helpful treatments for managing depression. In addition, making small changes to your everyday life can help to boost your mood and improve your wellbeing. Below are some tips for you to consider. Be sure to talk with your medical provider and/or behavioral health consultant if your symptoms are worsening or not improving.     Sleep   Sleep hygiene  means all of the habits that support good, restful sleep. It includes maintaining a consistent bedtime and wake time, using your bedroom only for sleeping or sex, and keeping the bedroom dark and free of distractions like a computer, smartphone, or television.     Develop a Healthy Routine  Maintain good hygiene. Get out of bed in the morning, make your bed, brush your teeth, take a shower, and get dressed. Don t spend too much time viewing media that makes you feel stressed. Find time to relax each day.    Exercise  Get some form of exercise every day. This will help reduce pain and release endorphins, the  feel good  chemicals in your brain. It can be as simple as just going for a walk or doing some gardening, anything that will get you moving.      Diet  Strive to eat healthy foods, including fruits and vegetables. Drink plenty of water. Avoid excessive sugar, caffeine, alcohol, and other mood-altering substances.     Stay Connected with Others  Stay in touch with friends and family members.    Manage Your Mood  Try deep breathing, massage therapy, biofeedback, or meditation. Take part in fun activities when you can. Try to find something to smile about each day.     Psychotherapy  Be open to working with a therapist if your provider recommends it.     Medication  Be sure to  take your medication as prescribed. Most anti-depressants need to be taken every day. It usually takes several weeks for medications to work. Not all medicines work for all people. It is important to follow-up with your provider to make sure you have a treatment plan that is working for you. Do not stop your medication abruptly without first discussing it with your provider.    Crisis Resources   These hotlines are for both adults and children. They and are open 24 hours a day, 7 days a week unless noted otherwise.    National Suicide Prevention Lifeline   988 or 3-520-675-VENB (0966)    Crisis Text Line    www.crisistextline.org  Text HOME to 556965 from anywhere in the [place], anytime, about any type of crisis. A live, trained crisis counselor will receive the text and respond quickly.    [employer] Lifeline for LGBTQ Youth  A national crisis intervention and suicide lifeline for LGBTQ youth under 25. Provides a safe place to talk without judgement. Call 1-434.501.2818; text START to 570460 or visit www.thetrevorproject.org to talk to a trained counselor.    For [facility] crisis numbers, visit the [facility] website at:  [place]://[place].gov/dhs/people-we-serve/adults/health-care/mental-health/resources/crisis-contacts.jsp

## 2025-05-20 ENCOUNTER — PATIENT OUTREACH (OUTPATIENT)
Dept: CARE COORDINATION | Facility: CLINIC | Age: 25
End: 2025-05-20
Payer: COMMERCIAL

## 2025-05-20 LAB — VIT B12 SERPL-MCNC: 208 PG/ML (ref 232–1245)

## 2025-05-22 ENCOUNTER — PATIENT OUTREACH (OUTPATIENT)
Dept: CARE COORDINATION | Facility: CLINIC | Age: 25
End: 2025-05-22
Payer: COMMERCIAL

## 2025-05-22 LAB
BKR LAB AP GYN ADEQUACY: NORMAL
BKR LAB AP GYN INTERPRETATION: NORMAL
BKR LAB AP HPV REFLEX: NORMAL
BKR LAB AP LMP: NORMAL
BKR LAB AP PREVIOUS ABNORMAL: NORMAL
PATH REPORT.COMMENTS IMP SPEC: NORMAL
PATH REPORT.COMMENTS IMP SPEC: NORMAL
PATH REPORT.RELEVANT HX SPEC: NORMAL

## 2025-07-21 ENCOUNTER — THERAPY VISIT (OUTPATIENT)
Dept: PHYSICAL THERAPY | Facility: CLINIC | Age: 25
End: 2025-07-21
Payer: COMMERCIAL

## 2025-07-21 DIAGNOSIS — N81.89 PELVIC FLOOR RELAXATION: Primary | ICD-10-CM

## 2025-07-21 PROCEDURE — 97530 THERAPEUTIC ACTIVITIES: CPT | Mod: GP | Performed by: PHYSICAL THERAPIST

## 2025-07-21 PROCEDURE — 97161 PT EVAL LOW COMPLEX 20 MIN: CPT | Mod: GP | Performed by: PHYSICAL THERAPIST

## 2025-07-21 PROCEDURE — 97110 THERAPEUTIC EXERCISES: CPT | Mod: GP | Performed by: PHYSICAL THERAPIST

## 2025-07-21 NOTE — PROGRESS NOTES
"PHYSICAL THERAPY EVALUATION  Type of Visit: Evaluation       Fall Risk Screen:  Have you fallen 2 or more times in the past year?: No  Have you fallen and had an injury in the past year?: No  Is patient receiving Physical Therapy Services?: No    Subjective         Presenting condition or subjective complaint: Patient reports a 10 year history of discomfort using tampons and during pelvic exams. She also reports a history of pain with intercourse, avoiding sex due to the pain. She recently began using the \"slender\" tampons with mild discomfort.   Date of onset: 05/19/25 (MD order for PT)    Relevant medical history:     Dates & types of surgery:      Prior diagnostic imaging/testing results: Other i had my doctor look into it and she said that muscel is the reson why i can do a lot of things like other pepole   Prior therapy history for the same diagnosis, illness or injury: No      Prior Level of Function  Transfers:   Ambulation:   ADL:   IADL:     Living Environment  Social support: With a significant other or spouse   Type of home: [facility]   Stairs to enter the home: Yes 20 Is there a railing: No     Ramp: Yes   Stairs inside the home: Yes 20 Is there a railing: Yes     Help at home: Self Cares (home health aide/personal care attendant, family, etc)  Equipment owned:       Employment: Yes   Hobbies/Interests: shopping fishing swimming    Patient goals for therapy: be able to be intement with my partner    Pain assessment: none     Objective      PELVIC EVALUATION  ADDITIONAL HISTORY:  Sex assigned at birth: Female  Gender identity: Female    Pronouns: She/Her Hers      Bladder History:  Feels bladder filling: Yes  Triggers for feeling of inability to wait to go to the bathroom: Yes    How long can you wait to urinate: 5  Gets up at night to urinate: Yes 3  Can stop the flow of urine when urinating: Sometimes  Volume of urine usually released: Medium   Other issues: Trouble emptying bladder " completely  Number of bladder infections in last 12 months:    Fluid intake per day: 80 oz      Medications taken for bladder: No     Activities causing urine leak: Hurrying to the bathroom due to a strong urge to urinate (pee)    Amount of urine typically leaked: connie  Pads used to help with leaking: No        Bowel History:  Frequency of bowel movement: 1  Consistency of stool: Soft-formed    Ignores the urge to defecate: No  Other bowel issues: Loss of gas  Length of time spent trying to have a bowel movement:      Sexual Function History:  Sexual orientation: Bisexual    Sexually active: No  Lubrication used: Yes Yes  Pelvic pain: Certain positions; Initial penetration (rectal or vaginal); Deep penetration (rectal or vaginal); Pelvic exams; Rectal exams; Use of tampon anything we do i get pain  Pain or difficulty with orgasms/erection/ejaculation: No    State of menopause: Perimenopause (have not gone through menopause yet)  Hormone medications: No      Are you currently pregnant: No  Number of previous pregnancies: 0  Number of deliveries: 0  Have you been diagnosed with pelvic prolapse or abdominal separation: No  Do you get regular exercise: No  Have you tried pelvic floor strengthening exercises for 4 weeks: Yes  Do you have any history of trauma that is relevant to your care that you d like to share: Yes, I d like to discuss it with my provider in person.      Discussed reason for referral regarding pelvic health needs and external/internal pelvic floor muscle examination with patient/guardian.  Opportunity provided to ask questions and verbal consent for assessment and intervention was given.    PAIN: Pain Level with Use: 3/10. 8/10 with intercourse  Pain Location: vagina  Pain Quality: Sharp  Pain Frequency: intermittent  Pain is Worst: NA  Pain is Exacerbated By: using tampon, pelvic exam, intercourse  Pain is Relieved By: stop intercourse  Pain Progression: Unchanged  POSTURE:   LUMBAR SCREEN:   HIP  "SCREEN:  Strength:    Functional Strength Testing:     PELVIC/SI SCREEN:     PAIN PROVOCATION TEST:   PELVIS/SI SPECIAL TESTS:   BREATHING SYMMETRY:     PELVIC EXAM  External Visual Inspection:  At rest: Normal  With voluntary pelvic floor contraction: Present  Relaxation of PFM: Partial/delayed relaxation    Integumentary:   Introitus: Unremarkable    External Digital Palpation per Perineum:   Transverse perineal: Tenderness  Levator ani: Pain  Perineal body: Tenderness    Scar:   Location/Type:   Mobility:     Internal Digital Palpation:  Per Vagina:  Myofascial Resistance to Palpation: Firm  Digital Muscle Performance: P (Power): 2-/5  E (Endurance): 2\"  F (Fast Twitch): 4  Compensations: Abdominals, Gluts  Relaxation Post-Contraction: Partial/delayed relaxation    Per Rectum:        Pelvic Organ Prolapse:       ABDOMINAL ASSESSMENT  Diastasis Rectus Abdominis (THOMAS):      Abdominal Activation/Strength:     Scar:   Location/Type:   Mobility:     Fascial Tension/Restriction:     BIOFEEDBACK:  Position:   Surface Electrodes:     Abdominals:     Perianals:       DERMATOMES:   DTR S:     Assessment & Plan   CLINICAL IMPRESSIONS  Medical Diagnosis: Pelvic floor relaxation    Treatment Diagnosis: Pelvic floor relaxation, pelvic pain, PFM weakness   Impression/Assessment: Patient is a 25 year old female with pelvic floor relaxation, pelvic pain complaints.  The following significant findings have been identified: Pain, Decreased strength, Impaired muscle performance, and Decreased activity tolerance. These impairments interfere with their ability to perform self care tasks as compared to previous level of function.     Clinical Decision Making (Complexity):  Clinical Presentation: Stable/Uncomplicated  Clinical Presentation Rationale: based on medical and personal factors listed in PT evaluation  Clinical Decision Making (Complexity): Low complexity    PLAN OF CARE  Treatment Interventions:  Modalities: " "Biofeedback  Interventions: Manual Therapy, Neuromuscular Re-education, Therapeutic Activity, Therapeutic Exercise, Self-Care/Home Management    Long Term Goals     PT Goal 1  Goal Identifier: pelvic pain  Goal Description: Improve pelvic floor muscle awareness/full relaxation to allow ease of using tampons painfree and painfree sex. (baseline: sex PL 8-10, tampons 5/10)  Rationale: to maximize safety and independence with self cares  Target Date: 10/13/25  PT Goal 2  Goal Identifier: pelvic floor strength & endurance  Goal Description: Increase PFM strength to 3+/5 5-8\" hold (baseline: MMT 2-/5,  2\" hold)  Rationale: to maximize safety and independence with self cares  Target Date: 10/13/25      Frequency of Treatment: 1x per week , every other week  Duration of Treatment: 12 weeks    Recommended Referrals to Other Professionals: Physical Therapy  Education Assessment:   Learner/Method: Patient;Listening;Pictures/Video    Risks and benefits of evaluation/treatment have been explained.   Patient/Family/caregiver agrees with Plan of Care.     Evaluation Time:     PT Eval, Low Complexity Minutes (53878): 30       Signing Clinician: Lizzeth Harvey, PT        [facility]                                                                                   OUTPATIENT PHYSICAL THERAPY      PLAN OF TREATMENT FOR OUTPATIENT REHABILITATION   Patient's Last Name, First Name, Heather Merida YOB: 2000   Provider's Name   [facility]   Medical Record No.  [id number]     Onset Date: 05/19/25 (MD order for PT)  Start of Care Date: 07/21/25     Medical Diagnosis:  Pelvic floor relaxation      PT Treatment Diagnosis:  Pelvic floor relaxation, pelvic pain, PFM weakness Plan of Treatment  Frequency/Duration: 1x per week , every other week/ 12 weeks    Certification date from 07/21/25 to 10/13/25         See note for plan of treatment details and functional " goals     Lizzeth Harvey, PT                         I CERTIFY THE NEED FOR THESE SERVICES FURNISHED UNDER        THIS PLAN OF TREATMENT AND WHILE UNDER MY CARE     (Physician attestation of this document indicates review and certification of the therapy plan).              Referring Provider:  Debbie Lewis    Initial Assessment  See Epic Evaluation- Start of Care Date: 07/21/25